# Patient Record
Sex: FEMALE | Race: WHITE | NOT HISPANIC OR LATINO | Employment: OTHER | URBAN - METROPOLITAN AREA
[De-identification: names, ages, dates, MRNs, and addresses within clinical notes are randomized per-mention and may not be internally consistent; named-entity substitution may affect disease eponyms.]

---

## 2017-06-02 ENCOUNTER — OFFICE VISIT (OUTPATIENT)
Dept: FAMILY MEDICINE | Facility: OTHER | Age: 62
End: 2017-06-02
Attending: NURSE PRACTITIONER
Payer: COMMERCIAL

## 2017-06-02 VITALS
DIASTOLIC BLOOD PRESSURE: 78 MMHG | BODY MASS INDEX: 23.82 KG/M2 | HEART RATE: 92 BPM | SYSTOLIC BLOOD PRESSURE: 136 MMHG | WEIGHT: 126 LBS

## 2017-06-02 DIAGNOSIS — N64.52 BLOODY DISCHARGE FROM RIGHT NIPPLE: ICD-10-CM

## 2017-06-02 DIAGNOSIS — Z72.0 TOBACCO ABUSE: ICD-10-CM

## 2017-06-02 DIAGNOSIS — L20.9 ATOPIC DERMATITIS, UNSPECIFIED TYPE: Primary | ICD-10-CM

## 2017-06-02 DIAGNOSIS — Z12.31 ENCOUNTER FOR SCREENING MAMMOGRAM FOR BREAST CANCER: ICD-10-CM

## 2017-06-02 LAB
BASOPHILS # BLD AUTO: 0 10E9/L (ref 0–0.2)
BASOPHILS NFR BLD AUTO: 0.7 %
DIFFERENTIAL METHOD BLD: ABNORMAL
EOSINOPHIL # BLD AUTO: 0.1 10E9/L (ref 0–0.7)
EOSINOPHIL NFR BLD AUTO: 1.9 %
ERYTHROCYTE [DISTWIDTH] IN BLOOD BY AUTOMATED COUNT: 12.4 % (ref 10–15)
HCT VFR BLD AUTO: 42.4 % (ref 35–47)
HGB BLD-MCNC: 14.6 G/DL (ref 11.7–15.7)
LYMPHOCYTES # BLD AUTO: 1.4 10E9/L (ref 0.8–5.3)
LYMPHOCYTES NFR BLD AUTO: 23.1 %
MCH RBC QN AUTO: 34.8 PG (ref 26.5–33)
MCHC RBC AUTO-ENTMCNC: 34.4 G/DL (ref 31.5–36.5)
MCV RBC AUTO: 101 FL (ref 78–100)
MONOCYTES # BLD AUTO: 0.5 10E9/L (ref 0–1.3)
MONOCYTES NFR BLD AUTO: 8.6 %
NEUTROPHILS # BLD AUTO: 3.9 10E9/L (ref 1.6–8.3)
NEUTROPHILS NFR BLD AUTO: 65.7 %
PLATELET # BLD AUTO: 227 10E9/L (ref 150–450)
RBC # BLD AUTO: 4.19 10E12/L (ref 3.8–5.2)
WBC # BLD AUTO: 5.9 10E9/L (ref 4–11)

## 2017-06-02 PROCEDURE — 85025 COMPLETE CBC W/AUTO DIFF WBC: CPT | Performed by: NURSE PRACTITIONER

## 2017-06-02 PROCEDURE — 87205 SMEAR GRAM STAIN: CPT | Performed by: NURSE PRACTITIONER

## 2017-06-02 PROCEDURE — 87070 CULTURE OTHR SPECIMN AEROBIC: CPT | Performed by: NURSE PRACTITIONER

## 2017-06-02 PROCEDURE — 36415 COLL VENOUS BLD VENIPUNCTURE: CPT | Performed by: NURSE PRACTITIONER

## 2017-06-02 PROCEDURE — 99214 OFFICE O/P EST MOD 30 MIN: CPT | Performed by: NURSE PRACTITIONER

## 2017-06-02 RX ORDER — TRIAMCINOLONE ACETONIDE 1 MG/G
OINTMENT TOPICAL
Qty: 80 G | Refills: 1 | Status: SHIPPED | OUTPATIENT
Start: 2017-06-02 | End: 2020-07-16

## 2017-06-02 NOTE — NURSING NOTE
"Chief Complaint   Patient presents with     Recheck Medication     Patient is here following up with her medications     Other     Patient reports leakage from her right.       Initial /78 (BP Location: Right arm, Patient Position: Chair, Cuff Size: Adult Regular)  Pulse 92  Wt 126 lb (57.2 kg)  Breastfeeding? No  BMI 23.82 kg/m2 Estimated body mass index is 23.82 kg/(m^2) as calculated from the following:    Height as of 1/6/15: 5' 0.98\" (1.549 m).    Weight as of this encounter: 126 lb (57.2 kg).  Medication Reconciliation: complete   Kely Traore      "

## 2017-06-02 NOTE — Clinical Note
Bloody - sometimes yellow right nipple discharge 2 weeks, no other symptoms.  Exam normal otherwise.  Overdue for bilateral mammo - last was 2015.  What study would you suggest?

## 2017-06-02 NOTE — MR AVS SNAPSHOT
After Visit Summary   6/2/2017    Rita Fournier    MRN: [id number]           Patient Information     Date Of Birth          1955        Visit Information        Provider Department      6/2/2017 2:45 PM Vannessa Anderson NP [facility]        Today's Diagnoses     Atopic dermatitis, unspecified type    -  1    Bloody discharge from right nipple          Care Instructions      ASSESSMENT/PLAN:                                                      1. Atopic dermatitis, unspecified type  - triamcinolone (KENALOG) 0.1 % ointment; Apply sparingly to affected area three times daily for 14 days.  Dispense: 80 g; Refill: 1    2. Bloody discharge from right nipple  - CBC with platelets differential  - Wound Culture Aerobic Bacterial  - Gram stain  - I will speak to Dr. Tianna Hanley, surgeon, about what imaging to order and will contact you on Monday      Please resume your aspirin 81 MG tablet; Take 1 tablet (81 mg) by mouth daily  Dispense: 100 tablet; Refill: 3      Vannessa Anderson NP  [facility]            Follow-ups after your visit        Who to contact     If you have questions or need follow up information about today's clinic visit or your schedule please contact [facility] directly at [phone].  Normal or non-critical lab and imaging results will be communicated to you by MyChart, letter or phone within 4 business days after the clinic has received the results. If you do not hear from us within 7 days, please contact the clinic through MyChart or phone. If you have a critical or abnormal lab result, we will notify you by phone as soon as possible.  Submit refill requests through [employer] or call your pharmacy and they will forward the refill request to us. Please allow 3 business days for your refill to be completed.          Additional Information About Your Visit        [employer] Information     [employer]ango lets you send messages to your doctor, view your test  "results, renew your prescriptions, schedule appointments and more. To sign up, go to [email or web address] . Click on \"Log in\" on the left side of the screen, which will take you to the Welcome page. Then click on \"Sign up Now\" on the right side of the page.     You will be asked to enter the access code listed below, as well as some personal information. Please follow the directions to create your username and password.     Your access code is: [email or web address]  Expires: 2017  3:19 PM     Your access code will  in 90 days. If you need help or a new code, please call your [place] clinic or [phone].        Care EveryWhere ID     This is your Care EveryWhere ID. This could be used by other organizations to access your [facility] medical records  [id number]        Your Vitals Were     Pulse Breastfeeding? BMI (Body Mass Index)             92 No 23.82 kg/m2          Blood Pressure from Last 3 Encounters:   17 136/78   10/13/15 150/82   01/12/15 121/91    Weight from Last 3 Encounters:   17 126 lb (57.2 kg)   10/13/15 130 lb (59 kg)   01/06/15 125 lb (56.7 kg)              We Performed the Following     CBC with platelets differential     Gram stain     Wound Culture Aerobic Bacterial          Where to get your medicines      These medications were sent to [facility] [id number] [place] [address]  AT [facility] [address]    [address] [facility] [address]     Phone:  [phone]     triamcinolone 0.1 % ointment         Some of these will need a paper prescription and others can be bought over the counter.  Ask your nurse if you have questions.     You don't need a prescription for these medications     aspirin 81 MG tablet          Primary Care Provider Office Phone # Fax #    Vannessa Anderson [phone]       [facility] [address] [address]        Thank you!     Thank you for choosing [facility]  for your " care. Our goal is always to provide you with excellent care. Hearing back from our patients is one way we can continue to improve our services. Please take a few minutes to complete the written survey that you may receive in the mail after your visit with us. Thank you!             Your Updated Medication List - Protect others around you: Learn how to safely use, store and throw away your medicines at www.disposemymeds.org.          This list is accurate as of: 6/2/17  3:19 PM.  Always use your most recent med list.                   Brand Name Dispense Instructions for use    aspirin 81 MG tablet     100 tablet    Take 1 tablet (81 mg) by mouth daily       CLINDAMYCIN HCL PO          KRILL OIL PO      Take 800 mg by mouth daily.       OMEGA-3 FISH OIL PO      Take 1 g by mouth daily       triamcinolone 0.1 % ointment    KENALOG    80 g    Apply sparingly to affected area three times daily for 14 days.       VITAMIN C PO      Take 500 mg by mouth 2 times daily       vitamin D 2000 UNITS tablet     100 tablet    Take 2,000 Units by mouth daily.

## 2017-06-02 NOTE — PROGRESS NOTES
"  SUBJECTIVE:                                                    Rita Fournier is a 61 year old female who presents to clinic today for the following health issues:    Concern - Right breast - nipple discharge     Onset: 2 weeks    Description:   Yellow / red discharge intermittently, no breast pain or lump    Intensity: mild    Progression of Symptoms:  constant    Accompanying Signs & Symptoms:  None.  Denies lump, fevers, pain       Previous history of similar problem:   No breast history in self or mom or siblings    Precipitating factors:   Worsened by: nothing    Alleviating factors:  Improved by: nothing       First period age 12-13  Breast fed 6 children    Menopause at age 38  Partial hysterectomy age 27 due to uterine prolapse  FH negative for breast or ovarian cancer.    Mammogram on file from 10/2015, dense tissue, read as benign findings.  Has not followed up for further mammograms, though orders have been placed.    Weight stable, no fevers or other symptoms      Problem list and histories reviewed & adjusted, as indicated.  Additional history: as documented    Patient Active Problem List   Diagnosis     Tobacco abuse     Dermatitis     Alopecia     Vitamin D deficiency     Past Surgical History:   Procedure Laterality Date     CHOLECYSTECTOMY  1975    Cholelithiasis     COLONOSCOPY N/A 1/12/2015    Procedure: COLONOSCOPY;  Surgeon: Tianna Hanley MD;  Location: [facility]     facial redisection gland removal      LEFT > infected neck gland     hysterectomy./partial  1981       Social History   Substance Use Topics     Smoking status: Current Every Day Smoker     Packs/day: 1.00     Years: 25.00     Types: Cigarettes     Smokeless tobacco: Never Used      Comment: Tried to Quit (YES); Longest Tobacco Free (\"Cutdown\")     Alcohol use Yes      Comment: OCCASIONALLY     Family History   Problem Relation Age of Onset     CANCER Brother      Liver     Colon Cancer Brother      CANCER Brother      " Pancreatic     CANCER Other      Family Hx     DIABETES Father      CANCER Father      Stomach     HEART DISEASE Father      Heart Disease     HEART DISEASE Mother      Heart Disease     CANCER Mother 73     Liver     CANCER Sister 57     Pancreatic - Cause of Death         Current Outpatient Prescriptions   Medication Sig Dispense Refill     CLINDAMYCIN HCL PO        triamcinolone (KENALOG) 0.1 % ointment Apply sparingly to affected area three times daily for 14 days. 80 g 1     Ascorbic Acid (VITAMIN C PO) Take 500 mg by mouth 2 times daily       Omega-3 Fatty Acids (OMEGA-3 FISH OIL PO) Take 1 g by mouth daily       KRILL OIL PO Take 800 mg by mouth daily.       Cholecalciferol (VITAMIN D) 2000 UNITS tablet Take 2,000 Units by mouth daily. (Patient not taking: Reported on 6/2/2017) 100 tablet 3     aspirin 81 MG tablet Take 1 tablet by mouth daily. (Patient not taking: Reported on 6/2/2017) 100 tablet 3     Allergies   Allergen Reactions     Penicillins Hives     BP Readings from Last 3 Encounters:   06/02/17 136/78   10/13/15 150/82   01/12/15 121/91    Wt Readings from Last 3 Encounters:   06/02/17 126 lb (57.2 kg)   10/13/15 130 lb (59 kg)   01/06/15 125 lb (56.7 kg)                  Labs reviewed in EPIC    Reviewed and updated as needed this visit by clinical staff  Tobacco  Allergies  Meds       Reviewed and updated as needed this visit by Provider         ROS:  Constitutional, HEENT, cardiovascular, pulmonary, gi and gu systems are negative, except as otherwise noted.    OBJECTIVE:                                                    /78 (BP Location: Right arm, Patient Position: Chair, Cuff Size: Adult Regular)  Pulse 92  Wt 126 lb (57.2 kg)  Breastfeeding? No  BMI 23.82 kg/m2  Body mass index is 23.82 kg/(m^2).     GENERAL: healthy, alert and no distress  NECK: no adenopathy, no asymmetry, masses, or scars and thyroid normal to palpation  RESP: lungs clear to auscultation - no rales, rhonchi  or wheezes  BREAST: pendulous breast tissue - no notable lumps, tissue change, masses, or tenderness.  No axillary or supraclavicular adenopathy.  Right nipple discharge - serosanguinous, culture obtained.  CV: regular rate and rhythm, normal S1 S2, no S3 or S4, no murmur, click or rub, no peripheral edema and peripheral pulses strong  MS: no gross musculoskeletal defects noted, no edema  SKIN: no suspicious lesions or rashes  NEURO: Normal strength and tone, mentation intact and speech normal           Results for orders placed or performed in visit on 06/02/17   CBC with platelets differential   Result Value Ref Range    WBC 5.9 4.0 - 11.0 10e9/L    RBC Count 4.19 3.8 - 5.2 10e12/L    Hemoglobin 14.6 11.7 - 15.7 g/dL    Hematocrit 42.4 35.0 - 47.0 %     (H) 78 - 100 fl    MCH 34.8 (H) 26.5 - 33.0 pg    MCHC 34.4 31.5 - 36.5 g/dL    RDW 12.4 10.0 - 15.0 %    Platelet Count 227 150 - 450 10e9/L    Diff Method Automated Method     % Neutrophils 65.7 %    % Lymphocytes 23.1 %    % Monocytes 8.6 %    % Eosinophils 1.9 %    % Basophils 0.7 %    Absolute Neutrophil 3.9 1.6 - 8.3 10e9/L    Absolute Lymphocytes 1.4 0.8 - 5.3 10e9/L    Absolute Monocytes 0.5 0.0 - 1.3 10e9/L    Absolute Eosinophils 0.1 0.0 - 0.7 10e9/L    Absolute Basophils 0.0 0.0 - 0.2 10e9/L   Wound Culture Aerobic Bacterial   Result Value Ref Range    Specimen Description Abscess     Special Requests R NIPPLE     Culture Micro Culture in progress     Micro Report Status Pending    Gram stain   Result Value Ref Range    Specimen Description Abscess     Special Requests R NIPPLE     Gram Stain       Rare Epithelial cells seen  No PMNs seen  No organisms seen      Micro Report Status FINAL 06/03/2017        Visit time:   25 Minutes  Time spent with patient, including examination, face to face patient education - 15 mn  Visit Content: During our face to face time, patient education was provided regarding diagnosis, and treatment pan. Patient  counseled regarding disease process  All diagnosis and treatment plan are reviewed with the patient, 50 % of face to face time is directed at patient education  Record review completed      ASSESSMENT/PLAN:                                                      1. Atopic dermatitis, unspecified type  - triamcinolone (KENALOG) 0.1 % ointment; Apply sparingly to affected area three times daily for 14 days.  Dispense: 80 g; Refill: 1    2. Bloody discharge from right nipple  - CBC with platelets differential  - Wound Culture Aerobic Bacterial  - Gram stain  - I will speak to Dr. Tianna Hanley, surgeon, about what imaging to order and will contact you on Monday      Please resume your aspirin 81 MG tablet; Take 1 tablet (81 mg) by mouth daily  Dispense: 100 tablet; Refill: 3      Vannessa Anderson NP  [facility]

## 2017-06-02 NOTE — PATIENT INSTRUCTIONS
ASSESSMENT/PLAN:                                                      1. Atopic dermatitis, unspecified type  - triamcinolone (KENALOG) 0.1 % ointment; Apply sparingly to affected area three times daily for 14 days.  Dispense: 80 g; Refill: 1    2. Bloody discharge from right nipple  - CBC with platelets differential  - Wound Culture Aerobic Bacterial  - Gram stain  - I will speak to Dr. Tianna Hanley, surgeon, about what imaging to order and will contact you on Monday      Please resume your aspirin 81 MG tablet; Take 1 tablet (81 mg) by mouth daily  Dispense: 100 tablet; Refill: 3      Vannessa Anderson NP  [facility]    HOW TO QUIT SMOKING  Smoking is one of the hardest habits to break. About half of all those who have ever smoked have been able to quit, and most of those (about 70%) who still smoke want to quit. Here are some of the best ways to stop smoking.     KEEP TRYING:  It takes most smokers about 8 tries before they are finally able to fully quit. So, the more often you try and fail, the better your chance of quitting the next time! So, don't give up!    GO COLD TURKEY:  Most ex-smokers quit cold turkey. Trying to cut back gradually doesn't seem to work as well, perhaps because it continues the smoking habit. Also, it is possible to fool yourself by inhaling more while smoking fewer cigarettes. This results in the same amount of nicotine in your body!    GET SUPPORT:  Support programs can make an important difference, especially for the heavy smoker. These groups offer lectures, methods to change your behavior and peer support. Call the free national Quitline for more information. 800-QUIT-NOW (055-113-1489). Low-cost or free programs are offered by many hospitals, local chapters of the American Lung Association (682-195-6275) and the American Cancer Society (207-613-6428). Support at home is important too. Non-smokers can help by offering praise and encouragement. If the smoker fails to quit,  encourage them to try again!    OVER-THE-COUNTER MEDICINES:  For those who can't quit on their own, Nicotine Replacement Therapy (NRT) may make quitting much easier. Certain aids such as the nicotine patch, gum and lozenge are available without a prescription. However, it is best to use these under the guidance of your doctor. The skin patch provides a steady supply of nicotine to the body. Nicotine gum and lozenge gives temporary bursts of low levels of nicotine. Both methods take the edge off the craving for cigarettes. WARNING: If you feel symptoms of nicotine overdose, such as nausea, vomiting, dizziness, weakness, or fast heartbeat, stop using these and see your doctor.    PRESCRIPTION MEDICINES:  After evaluating your smoking patterns and prior attempts at quitting, your doctor may offer a prescription medicine such as bupropion (Zyban, Wellbutrin), varenicline (Chantix, Champix), a niocotine inhaler or nasal spray. Each has its unique advantage and side effects which your doctor can review with you.    HEALTH BENEFITS OF QUITTING:  The benefits of quitting start right away and keep improving the longer you go without smokin minutes: blood pressure and pulse return to normal  8 hours: oxygen levels return to normal  2 days: ability to smell and taste begins to improve as damaged nerves start to regrow  2-3 weeks: circulation and lung function improves  1-9 months: decreased cough, congestion and shortness of breath; less tired  1 year: risk of heart attack decreases by half  5 years: risk of lung cancer decreases by half; risk of stroke becomes the same as a non-smoker  For information about how to quit smoking, visit the following links:  National [employer] ,   Clearing the Air, Quit Smoking Today   - an online booklet. http://www.smokefree.gov/pubs/clearing_the_air.pdf  Smokefree.gov http://smokefree.gov/  QuitNet http://www.quitnet.com/    7755-2407 Radha Tavera, [address] [facility],  [place] [address]. All rights reserved. This information is not intended as a substitute for professional medical care. Always follow your healthcare professional's instructions.    The Benefits of Living Smoke Free  What do you want to gain from quitting? Check off some reasons to quit.  Health Benefits  ___ Reduce my risk of lung cancer, heart disease, chronic lung disease  ___ Have fewer wrinkles and softer skin  ___ Improve my sense of taste and smell  ___ For pregnant women--reduce the risk of having a miscarriage, stillbirth, premature birth, or low-birth-weight baby  Personal Benefits  ___ Feel more in control of my life  ___ Have better-smelling hair, breath, clothes, home, and car  ___ Save time by not having to take smoke breaks, buy cigarettes, or hunt for a light  ___ Have whiter teeth  Family Benefits  ___ Reduce my children s respiratory tract infections  ___ Set a good example for my children  ___ Reduce my family s cancer risk  Financial Benefits  ___ Save hundreds of dollars each year that would be spent on cigarettes  ___ Save money on medical bills  ___ Save on life, health, and car insurance premiums    Those Dollars Add Up!  Cigarettes are expensive, and getting more expensive all the time. Do you realize how much money you are spending on cigarettes per year? What is the average amount you spend on a pack of cigarettes? What is the average number of packs that you smoke per day? Using your answers to these questions, fill in this formula to help you find out:  ($ _____ per pack) ×  ( _____ number of packs per day) × (365 days) =  $ _____ yearly cost of smoking  Besides tobacco, there are other costs, including extra cleaning bills and replacement costs for clothing and furniture; medical expenses for smoking-related illnesses; and higher health, life, and car insurance premiums.    Cigars and Pipes Count Too!  Cigars and pipes are also dangerous. So are smokeless (chewing) tobacco and snuff.  All of these products contain nicotine, a highly addictive substance that has harmful effects on your body. Quitting smoking means giving up all tobacco products.      8857-3695 Radha [facility], [address] [place] [address], [place] [address]. All rights reserved. This information is not intended as a substitute for professional medical care. Always follow your healthcare professional's instructions.

## 2017-06-02 NOTE — Clinical Note
Please let her know that no bacteria grew from her breast drainage culture, and her CBC was normal.  I will be speaking to General Surgeon Dr Hanley, to see what imaging she recommends.  Regarding her smoking, I sent Quit-plan through, so she will be getting information from them and can at least learn more, and consider options.  Will call her as soon as I hear from Dr. Hanley  Thank you;  Vannessa Anderson [facility] [phone]

## 2017-06-02 NOTE — Clinical Note
Please let patient know that I spoke with Dr. Hanley and she recommends a bilateral mammogram (tomogram) and right breast US.  Orders were placed.  Pls have Radha confirm that this gets done this week.  Thank you  Vannessa MAYERSROBBY [phone]

## 2017-06-03 LAB
GRAM STN SPEC: NORMAL
Lab: NORMAL
MICRO REPORT STATUS: NORMAL
SPECIMEN SOURCE: NORMAL

## 2017-06-05 LAB
BACTERIA SPEC CULT: NORMAL
Lab: NORMAL
MICRO REPORT STATUS: NORMAL
SPECIMEN SOURCE: NORMAL

## 2017-06-06 NOTE — PROGRESS NOTES
Pt notified of Vannessa's comments, scheduled already for 6-14th, will let Vannessa know if this is soon enough.

## 2017-06-07 ENCOUNTER — TELEPHONE (OUTPATIENT)
Dept: FAMILY MEDICINE | Facility: OTHER | Age: 62
End: 2017-06-07

## 2017-06-08 DIAGNOSIS — N64.52 BLOODY DISCHARGE FROM RIGHT NIPPLE: Primary | ICD-10-CM

## 2017-06-08 DIAGNOSIS — R92.8 ABNORMAL ULTRASOUND OF BREAST: ICD-10-CM

## 2017-06-09 ENCOUNTER — HOSPITAL ENCOUNTER (OUTPATIENT)
Dept: ULTRASOUND IMAGING | Facility: HOSPITAL | Age: 62
Discharge: HOME | End: 2017-06-09
Attending: NURSE PRACTITIONER | Admitting: NURSE PRACTITIONER
Payer: COMMERCIAL

## 2017-06-09 PROCEDURE — 76642 ULTRASOUND BREAST LIMITED: CPT | Mod: TC,RT | Performed by: RADIOLOGY

## 2017-06-09 PROCEDURE — G0204 DX MAMMO INCL CAD BI: HCPCS | Mod: TC | Performed by: RADIOLOGY

## 2017-06-09 PROCEDURE — G0279 TOMOSYNTHESIS, MAMMO: HCPCS | Mod: TC | Performed by: RADIOLOGY

## 2017-06-09 NOTE — PROGRESS NOTES
I believe Dr. Hanley is going to see her  Did the breast center notify patient?  Ill enter Gen Surg Consult    Thank you

## 2017-06-12 DIAGNOSIS — N63.10 BREAST MASS, RIGHT: Primary | ICD-10-CM

## 2017-06-13 ENCOUNTER — TELEPHONE (OUTPATIENT)
Dept: MAMMOGRAPHY | Facility: OTHER | Age: 62
End: 2017-06-13

## 2017-06-13 NOTE — TELEPHONE ENCOUNTER
Dr. Hanley states ok to schedule ultrasound breast biopsy.  Patient education performed on what to expect with breast biopsy, questions answered.  Appointment given of 6/15 to arrive at 1000 at hospital registration.  Asiya MATTHEW.

## 2017-06-15 ENCOUNTER — HOSPITAL ENCOUNTER (OUTPATIENT)
Dept: INTERVENTIONAL RADIOLOGY/VASCULAR | Facility: HOSPITAL | Age: 62
End: 2017-06-15
Attending: SURGERY
Payer: COMMERCIAL

## 2017-06-15 ENCOUNTER — HOSPITAL ENCOUNTER (OUTPATIENT)
Dept: ULTRASOUND IMAGING | Facility: HOSPITAL | Age: 62
Discharge: HOME | End: 2017-06-15
Attending: SURGERY | Admitting: SURGERY
Payer: COMMERCIAL

## 2017-06-15 PROCEDURE — 40000272 ZZH STATISTIC POST PROCEDURE PLACEMENT MAMMOGRAMS: Mod: TC,RT

## 2017-06-15 PROCEDURE — 19083 BX BREAST 1ST LESION US IMAG: CPT | Mod: TC,RT

## 2017-06-15 PROCEDURE — 88305 TISSUE EXAM BY PATHOLOGIST: CPT | Mod: TC | Performed by: SURGERY

## 2017-06-15 PROCEDURE — 25000125 ZZHC RX 250

## 2017-06-15 RX ORDER — LIDOCAINE HYDROCHLORIDE 10 MG/ML
INJECTION, SOLUTION EPIDURAL; INFILTRATION; INTRACAUDAL; PERINEURAL
Status: COMPLETED
Start: 2017-06-15 | End: 2017-06-15

## 2017-06-15 RX ORDER — LIDOCAINE HYDROCHLORIDE 10 MG/ML
20 INJECTION, SOLUTION EPIDURAL; INFILTRATION; INTRACAUDAL; PERINEURAL ONCE
Status: COMPLETED | OUTPATIENT
Start: 2017-06-15 | End: 2017-06-15

## 2017-06-15 RX ORDER — LIDOCAINE HYDROCHLORIDE AND EPINEPHRINE 15; 5 MG/ML; UG/ML
10 INJECTION, SOLUTION EPIDURAL ONCE
Status: DISCONTINUED | OUTPATIENT
Start: 2017-06-15 | End: 2017-06-16 | Stop reason: HOSPADM

## 2017-06-15 RX ADMIN — LIDOCAINE HYDROCHLORIDE 70 MG: 10 INJECTION, SOLUTION EPIDURAL; INFILTRATION; INTRACAUDAL; PERINEURAL at 10:31

## 2017-06-15 NOTE — PROGRESS NOTES
Patient here for ultrasound right breast biopsy.  Procedure explained by myself and by radiologist and all questions answered.  Consent signed.  Pt to biopsy room.  Time out done and biopsy performed.  Clip placed after biopsy.  Clip lot number [id number] .  Post clip mammogram performed.  Pressure held to site until bleeding completely stopped.  Steristrips and pressure dressing applied to site.  Ice pack with instructions on use given to patient.  Verbal and written discharge instructions given to patient.  Patient states understanding of all discharge instructions.  Pt was discharged to home in stable condition and without evidence of bleeding.  Asiya MATTHEW

## 2017-06-15 NOTE — IP AVS SNAPSHOT
MRN:[id number]                      After Visit Summary   6/15/2017    Rita Fournier    MRN: [id number]           Visit Information        Provider Department      6/15/2017 10:30 AM Radiologist, [facility]           Review of your medicines      UNREVIEWED medicines. Ask your doctor about these medicines        Dose / Directions    aspirin 81 MG tablet        Dose:  81 mg   Take 1 tablet (81 mg) by mouth daily   Quantity:  100 tablet   Refills:  3       CLINDAMYCIN HCL PO        Refills:  0       KRILL OIL PO        Dose:  800 mg   Take 800 mg by mouth daily.   Refills:  0       OMEGA-3 FISH OIL PO        Dose:  1 g   Take 1 g by mouth daily   Refills:  0       triamcinolone 0.1 % ointment   Commonly known as:  KENALOG   Used for:  Atopic dermatitis, unspecified type        Apply sparingly to affected area three times daily for 14 days.   Quantity:  80 g   Refills:  1       VITAMIN C PO        Dose:  500 mg   Take 500 mg by mouth 2 times daily   Refills:  0       vitamin D 2000 UNITS tablet   Used for:  Vitamin D deficiency        Dose:  2000 Units   Take 2,000 Units by mouth daily.   Quantity:  100 tablet   Refills:  3       ZITHROMAX Z-BILLY PO        Dose:  250 mg   Take 250 mg by mouth daily   Refills:  0                Protect others around you: Learn how to safely use, store and throw away your medicines at www.disposemymeds.org.         Follow-ups after your visit        Your next 10 appointments already scheduled     Josiah 15, 2017 10:30 AM CDT   Radiology with [facility] UNTRASOUND 1   [facility] ([facility] )    [address] [place] [address]   [phone]            Josiah 15, 2017 10:30 AM CDT   Radiology with Need Interventional Radiologist   [facility] ([facility] )    [address] [place] [address]   [phone]               Care Instructions        Further instructions from your care team      "    DISCHARGE INSTRUCTIONS FOR  BREAST BIOPSY    BREAST BIOPSY  A needle was used to locate the breast tissue. Tissue was removed to check the cells and be examined by a Pathologist. This will help your doctor plan any further treatment or follow-up. Your doctor will tell you the results of the tests in 3 to 5 days.    Follow these instructions:    Climb stairs slowly and use the hand rail. Avoid extra trips. No running or jumping.    No pushing, pulling or straining (vacuuming, shoveling, sweeping etc.)    You may shower tomorrow, pat the are dry around the tegaderm dressing.    Wear a bra at all times until your breast is fully healed.    Apply ice to the breast during the first few hours following the procedure to relieve swelling and bruising.    Steri strips stay in place for 7-10 days or until they fall off. Do not pull them off.    May remove pressure dressing after 24 hours.    Call your doctor if you have:    increased bleeding or drainage from your incision.    swelling, redness or opening of your incision.    foul smell from your incision or dressing.    red streaks from your incision.    chills or fever over 101 degrees (by mouth).    pain not helped by your pain medication.    trouble or pain with breathing.    any new problems or concerns.     Additional Information About Your Visit        B[employer] Information     [employer] lets you send messages to your doctor, view your test results, renew your prescriptions, schedule appointments and more. To sign up, go to [email or web address] . Click on \"Log in\" on the left side of the screen, which will take you to the Welcome page. Then click on \"Sign up Now\" on the right side of the page.     You will be asked to enter the access code listed below, as well as some personal information. Please follow the directions to create your username and password.     Your access code is: [email or web address]  Expires: 2017  3:19 PM     Your access code will  in 90 days. " If you need help or a new code, please call your [place] clinic or [phone].        Care EveryWhere ID     This is your Care EveryWhere ID. This could be used by other organizations to access your [place] medical records  [id number]         Primary Care Provider Office Phone # Fax Natasha Anderson [phone]      Thank you!     Thank you for choosing [place] for your care. Our goal is always to provide you with excellent care. Hearing back from our patients is one way we can continue to improve our services. Please take a few minutes to complete the written survey that you may receive in the mail after you visit with us. Thank you!             Medication List: This is a list of all your medications and when to take them. Check marks below indicate your daily home schedule. Keep this list as a reference.      Medications           Morning Afternoon Evening Bedtime As Needed    aspirin 81 MG tablet   Take 1 tablet (81 mg) by mouth daily                                CLINDAMYCIN HCL PO                                KRILL OIL PO   Take 800 mg by mouth daily.                                OMEGA-3 FISH OIL PO   Take 1 g by mouth daily                                triamcinolone 0.1 % ointment   Commonly known as:  KENALOG   Apply sparingly to affected area three times daily for 14 days.                                VITAMIN C PO   Take 500 mg by mouth 2 times daily                                vitamin D 2000 UNITS tablet   Take 2,000 Units by mouth daily.                                ZITHROMAX Z-BILLY PO   Take 250 mg by mouth daily

## 2017-06-15 NOTE — IP AVS SNAPSHOT
[facility]    [address] [place] [address]    Phone:  [phone]    Fax:  [phone]                                       After Visit Summary   6/15/2017    Rita Fournier    MRN: [id number]           After Visit Summary Signature Page     I have received my discharge instructions, and my questions have been answered. I have discussed any challenges I see with this plan with the nurse or doctor.    ..........................................................................................................................................  Patient/Patient Representative Signature      ..........................................................................................................................................  Patient Representative Print Name and Relationship to Patient    ..................................................               ................................................  Date                                            Time    ..........................................................................................................................................  Reviewed by Signature/Title    ...................................................              ..............................................  Date                                                            Time

## 2017-06-16 LAB — COPATH REPORT: NORMAL

## 2017-06-19 ENCOUNTER — TELEPHONE (OUTPATIENT)
Dept: MAMMOGRAPHY | Facility: OTHER | Age: 62
End: 2017-06-19

## 2017-06-19 NOTE — TELEPHONE ENCOUNTER
Patient has already been notified of pathology results by Vannessa Anderson's office.  Informed patient that a six month follow up mammogram has been ordered, DI schedulers notified to call in December to schedule.  Patient requests appointment with Dr. Hanley.  Dr. Hanley notified and surgery [facility] notified to ronni Braden RN.

## 2017-06-21 DIAGNOSIS — N64.52 DISCHARGE FROM RIGHT NIPPLE: Primary | ICD-10-CM

## 2017-06-21 NOTE — PROGRESS NOTES
General surgery - Nipple discharge, imaging and path are in Epic    Vannessa Anderson [facility]  [phone]

## 2017-06-21 NOTE — Clinical Note
I entered another order for surgical consult with you. Imaging and biopsy are in Epic.  Nipple discharge persists and is really bothering her. Id appreciate it if you could see her.  Vannessa [facility] [phone]

## 2017-06-26 ENCOUNTER — OFFICE VISIT (OUTPATIENT)
Dept: SURGERY | Facility: OTHER | Age: 62
End: 2017-06-26
Attending: NURSE PRACTITIONER
Payer: COMMERCIAL

## 2017-06-26 VITALS
TEMPERATURE: 97.9 F | RESPIRATION RATE: 20 BRPM | HEART RATE: 75 BPM | WEIGHT: 122 LBS | SYSTOLIC BLOOD PRESSURE: 154 MMHG | HEIGHT: 61 IN | DIASTOLIC BLOOD PRESSURE: 68 MMHG | BODY MASS INDEX: 23.03 KG/M2 | OXYGEN SATURATION: 94 %

## 2017-06-26 DIAGNOSIS — N63.0 BREAST MASS: Primary | ICD-10-CM

## 2017-06-26 PROCEDURE — 99244 OFF/OP CNSLTJ NEW/EST MOD 40: CPT | Performed by: SURGERY

## 2017-06-26 RX ORDER — CEFAZOLIN SODIUM 2 G/100ML
2 INJECTION, SOLUTION INTRAVENOUS
Status: CANCELLED | OUTPATIENT
Start: 2017-06-26

## 2017-06-26 ASSESSMENT — PAIN SCALES - GENERAL: PAINLEVEL: NO PAIN (0)

## 2017-06-26 NOTE — PROGRESS NOTES
"[facility] Surgery Consultation    CC:  Right bloody nipple discharge    HPI:  This 61 year old year old female is seen at the request of Vannessa Anderson NP for evaluation of bloody nipple discharge.   The patient noted the onset of bloody nipple discharge approximately one month ago.  This was spontaneous, culture negative and evaluated with imaging.  Ultrasound was performed and an abnormality was identified, biopsied and benign fibrocystic change noted.     RISK FACTORS FOR BREAST NEOPLASIA:  Her menarche was at age 10 and menopause at age 40.  She was on estrogen replacement for 5 years.  She has 6 children, the eldest age 41 and breast fed all.  There is no known history of breast, ovarian, colon or pancreatic malignancy in her family.    Past Medical History:   Diagnosis Date     Alopecia      Dermatitis      Hyperlipidemia LDL goal < 100      Tobacco abuse 06/27/2011     Vitamin D deficiency 5/7/2013     Past Surgical History:   Procedure Laterality Date     BIOPSY Right 06/15/2017    US guided right breast needle biopsy     CHOLECYSTECTOMY  1975    Cholelithiasis     COLONOSCOPY N/A 1/12/2015    Procedure: COLONOSCOPY;  Surgeon: Tianna Hanley MD;  Location: [facility] OR     facial redisection gland removal      LEFT > infected neck gland     hysterectomy./partial  1981     Current Outpatient Prescriptions   Medication     triamcinolone (KENALOG) 0.1 % ointment     aspirin 81 MG tablet     Ascorbic Acid (VITAMIN C PO)     Omega-3 Fatty Acids (OMEGA-3 FISH OIL PO)     KRILL OIL PO     Cholecalciferol (VITAMIN D) 2000 UNITS tablet     No current facility-administered medications for this visit.      Allergies   Allergen Reactions     Penicillins Hives     HABITS:    Social History   Substance Use Topics     Smoking status: Current Every Day Smoker     Packs/day: 1.00     Years: 25.00     Types: Cigarettes     Smokeless tobacco: Never Used      Comment: Tried to Quit (YES); Longest Tobacco Free (\"Cutdown\")     " "Alcohol use Yes      Comment: OCCASIONALLY       Family History   Problem Relation Age of Onset     CANCER Brother      Liver     Colon Cancer Brother      CANCER Brother      Pancreatic     CANCER Other      Family Hx     DIABETES Father      CANCER Father      Stomach     HEART DISEASE Father      Heart Disease     HEART DISEASE Mother      Heart Disease     CANCER Mother 73     Liver     CANCER Sister 57     Pancreatic - Cause of Death       REVIEW OF SYSTEMS:  Ten point review of systems negative except those mentioned in the HPI.     OBJECTIVE:    /68 (BP Location: Right arm, Patient Position: Chair, Cuff Size: Adult Regular)  Pulse 75  Temp 97.9  F (36.6  C) (Tympanic)  Resp 20  Ht 5' 1\" (1.549 m)  Wt 122 lb (55.3 kg)  SpO2 94%  BMI 23.05 kg/m2    GENERAL: Generally appears well, in no distress with appropriate affect.  HEENT:   Sclerae anicteric - No cervical, supra/infraclavciular lymphadenopathy, no thyroid masses  Respiratory:  Lungs clear to ausculation bilaterally with good air excursion  Cardiovascular:  Regular Rate and Rhythm with no murmurs gallops or rubs, normal   Breast Examination:  In the seated position with the arms elevated, there is ecchymosis at the 7:00 position from biopsy.  There is no contour distortion, skin change or nipple abnormality.    In the supine position, the right breast shows a tender, firm region at 7:00 position at the areolar border extending laterally one cm.  The remainder of the right breast shows a homogeneous parenchyma.  Focused examination in all quadrants shows no suggestion of mass, density, region of tenderness or palpable abnormality.  The subareolar region, axillary tail and right axilla are similarly without finding.    The left breast shows a similar homogeneous parenchyma devoid of abnormality.  There is no palpable correlate in the region of mammographic suspicion.  Focused examination in all quadrants shows no region of discrete or suspicious " mass, asymmetric density or tenderness.  The subareolar region, axillary tail and left axilla are without finding.  A skin tag is present at the medial nipple region and excision requested.    :  deferred  Extremities:  Extremities normal. No deformities, edema, or skin discoloration.  Skin:  no suspicious lesions or rashes  Neurological: grossly intact    Psych:  Alert, oriented, affect appropriate with normal decision making ability.    IMPRESSION:  Bloody nipple discharge, dilated ducts with guided biopsy showing benign fibrocystic change which is not concordant.  Excision is indicated.  Pathologic entities described including ductal ectasia, papilloma, malignancy and questions were asked and answered.   Procedure, risks, benefits and approach discussed at length and she voices understanding.    PLAN:  Scheduled Monday, July 17, 2017.  Pre-op with KASEY Quick MD, FACS    6/26/2017  3:19 PM    cc:  Vannessa Anderson NP

## 2017-06-26 NOTE — NURSING NOTE
"Chief Complaint   Patient presents with     Consult     s/p 6/15/17 right US guided needle breast biopsy.       Initial /68 (BP Location: Right arm, Patient Position: Chair, Cuff Size: Adult Regular)  Pulse 75  Temp 97.9  F (36.6  C) (Tympanic)  Resp 20  Ht 5' 1\" (1.549 m)  Wt 122 lb (55.3 kg)  SpO2 94%  BMI 23.05 kg/m2 Estimated body mass index is 23.05 kg/(m^2) as calculated from the following:    Height as of this encounter: 5' 1\" (1.549 m).    Weight as of this encounter: 122 lb (55.3 kg).  Medication Reconciliation: cristobal Alvarado LPN    "

## 2017-06-26 NOTE — PATIENT INSTRUCTIONS
Thank you for allowing Dr. Hanley and our surgical team to participate in your care.  If you have a scheduling or an appointment question please contact our Health Unit Coordinator, Malina,  at her direct line [phone].   ALL nursing questions or concerns can be directed to your surgical nurse at: [phone] -Emy    You are scheduled for Excisional biopsy right breast mass - excision skin tag left breast.    Your procedure date is:   Monday, July 17, 2017    Nothing to eat or drink after midnight the evening prior to the procedure, Sunday, July 16, 2017 July 12, 2017 is your preop with Vannessa Anderson NP      HOW TO PREPARE-      You need to have a scheduled Pre-Op with your primary care physician within 10 days of your scheduled surgery. Please call as soon as possible to schedule this.       You need a friend or family member available to drive you home AND stay with you for 24 hours after you leave the hospital. You will not be allowed to drive yourself. IF you need to take a taxi or the bus you MUST have a responsible person to ride with you. YOUR PROCEDURE WILL BE CANCELLED IF YOU DO NOT HAVE A RESPONSIBLE ADULT TO DRIVE YOU HOME.       You CANNOT have anything to eat or drink after midnight the night before your surgery, ncluding water and coffee. Your stomach needs to be completely empty. Do NOT chew gum, suck on hard candy, or smoke. You can brush your teeth the morning of surgery.       You need to call our Surgery Education Nurses 1-2 weeks prior to your surgery date at  [phone] or toll free [phone]. Please have you medication and allergy lists ready.      Stop your aspirin or other NSAIDs(Ibuprofen, Motrin, Aleve, Celebrex, Naproxen, etc...) 7 days before your surgery.      Hospital admitting will call you the day before your surgery with your arrival time. If you are scheduled on a Monday admitting will call you the Friday before.      Please call your primary care physician if you  should become ill within 24 hours of scheduled surgery. (ex.vomiting, diarrhea, fever)          You will need to wash the night before AND the morning of you procedure with the supplied Hibiclens. Wash your Surgical area with your bare hands, apply friction and rinse. KEEP IT AWAY FROM YOUR EYES, EARS, NOSE AND MOUTH.

## 2017-06-26 NOTE — MR AVS SNAPSHOT
After Visit Summary   6/26/2017    Rita Fournier    MRN: [id number]           Patient Information     Date Of Birth          1955        Visit Information        Provider Department      6/26/2017 3:00 PM Tianna Hanley MD [facility]        Today's Diagnoses     Symptomatic cholelithiasis    -  1      Care Instructions    Thank you for allowing Dr. Hanley and our surgical team to participate in your care.  If you have a scheduling or an appointment question please contact our Health Unit Coordinator, Malina,  at her direct line [phone].   ALL nursing questions or concerns can be directed to your surgical nurse at: [phone] -Emy    You are scheduled for Excisional biopsy right breast mass - excision skin tag left breast.    Your procedure date is:   Monday, July 17, 2017    Nothing to eat or drink after midnight the evening prior to the procedure, Sunday, July 16, 2017 July 12, 2017 is your preop with Vannessa Anderson NP      HOW TO PREPARE-      You need to have a scheduled Pre-Op with your primary care physician within 10 days of your scheduled surgery. Please call as soon as possible to schedule this.       You need a friend or family member available to drive you home AND stay with you for 24 hours after you leave the hospital. You will not be allowed to drive yourself. IF you need to take a taxi or the bus you MUST have a responsible person to ride with you. YOUR PROCEDURE WILL BE CANCELLED IF YOU DO NOT HAVE A RESPONSIBLE ADULT TO DRIVE YOU HOME.       You CANNOT have anything to eat or drink after midnight the night before your surgery, ncluding water and coffee. Your stomach needs to be completely empty. Do NOT chew gum, suck on hard candy, or smoke. You can brush your teeth the morning of surgery.       You need to call our Surgery Education Nurses 1-2 weeks prior to your surgery date at  [phone] or toll free [phone]. Please have you medication  and allergy lists ready.      Stop your aspirin or other NSAIDs(Ibuprofen, Motrin, Aleve, Celebrex, Naproxen, etc...) 7 days before your surgery.      Hospital admitting will call you the day before your surgery with your arrival time. If you are scheduled on a Monday admitting will call you the Friday before.      Please call your primary care physician if you should become ill within 24 hours of scheduled surgery. (ex.vomiting, diarrhea, fever)          You will need to wash the night before AND the morning of you procedure with the supplied Hibiclens. Wash your Surgical area with your bare hands, apply friction and rinse. KEEP IT AWAY FROM YOUR EYES, EARS, NOSE AND MOUTH.             Follow-ups after your visit        Your next 10 appointments already scheduled     Jul 12, 2017  1:15 PM CDT   (Arrive by 1:00 PM)   Pre-Op physical with Vannessa Anderson NP   [facility] ([facility] )    [address] [facility] [address]   [phone]              Who to contact     If you have questions or need follow up information about today's clinic visit or your schedule please contact [facility] directly at [phone].  Normal or non-critical lab and imaging results will be communicated to you by MyChart, letter or phone within 4 business days after the clinic has received the results. If you do not hear from us within 7 days, please contact the clinic through MyChart or phone. If you have a critical or abnormal lab result, we will notify you by phone as soon as possible.  Submit refill requests through [employer] App or call your pharmacy and they will forward the refill request to us. Please allow 3 business days for your refill to be completed.          Additional Information About Your Visit        [employer] Information     [employer] App lets you send messages to your doctor, view your test results, renew your prescriptions, schedule appointments and more. To sign up, go to  [email or web address] . Click on \"Log in\" on the left side of the screen, which will take you to the Welcome page. Then click on \"Sign up Now\" on the right side of the page.     You will be asked to enter the access code listed below, as well as some personal information. Please follow the directions to create your username and password.     Your access code is: [email or web address]  Expires: 2017  3:19 PM     Your access code will  in 90 days. If you need help or a new code, please call your [place] clinic or [phone].        Care EveryWhere ID     This is your Care EveryWhere ID. This could be used by other organizations to access your [place] medical records  [id number]        Your Vitals Were     Pulse Temperature Respirations Height Pulse Oximetry BMI (Body Mass Index)    75 97.9  F (36.6  C) (Tympanic) 20 5' 1\" (1.549 m) 94% 23.05 kg/m2       Blood Pressure from Last 3 Encounters:   17 154/68   17 136/78   10/13/15 150/82    Weight from Last 3 Encounters:   17 122 lb (55.3 kg)   17 126 lb (57.2 kg)   10/13/15 130 lb (59 kg)              Today, you had the following     No orders found for display       Primary Care Provider Office Phone # Fax #    Vannessa AndersonKASEY [phone]       [address] [address]        Equal Access to Services     MARIA ALEJANDRA MURDOCK : Hadii aad ku hadasho Soomaali, waaxda luqadaha, qaybta kaalmada adedanyyalakeisha, mirtha andersen . So [facility] [phone].    ATENCIÓN: Si habla español, tiene a soto disposición servicios gratuitos de asistencia lingüística. Llame al [phone].    We comply with applicable federal civil rights laws and [place] laws. We do not discriminate on the basis of race, color, national origin, age, disability sex, sexual orientation or gender identity.            Thank you!     Thank you for choosing [facility]  for your care. Our goal is always to provide " you with excellent care. Hearing back from our patients is one way we can continue to improve our services. Please take a few minutes to complete the written survey that you may receive in the mail after your visit with us. Thank you!             Your Updated Medication List - Protect others around you: Learn how to safely use, store and throw away your medicines at www.disposemymeds.org.          This list is accurate as of: 6/26/17  3:39 PM.  Always use your most recent med list.                   Brand Name Dispense Instructions for use Diagnosis    aspirin 81 MG tablet     100 tablet    Take 1 tablet (81 mg) by mouth daily        KRILL OIL PO      Take 800 mg by mouth daily.        OMEGA-3 FISH OIL PO      Take 1 g by mouth daily        triamcinolone 0.1 % ointment    KENALOG    80 g    Apply sparingly to affected area three times daily for 14 days.    Atopic dermatitis, unspecified type       VITAMIN C PO      Take 500 mg by mouth 2 times daily        vitamin D 2000 UNITS tablet     100 tablet    Take 2,000 Units by mouth daily.    Vitamin D deficiency

## 2017-07-10 NOTE — OR NURSING
Email sent secure to [facility] as follows;    We have Rita Fournier : 1955 coming  for mass removed from right breast and skin tag removed from left breast with Dr. Hanley.    Thank you,  Yanelis Hector R.N.  Pre-Anesthesia Testing  [facility]

## 2017-07-12 ENCOUNTER — OFFICE VISIT (OUTPATIENT)
Dept: FAMILY MEDICINE | Facility: OTHER | Age: 62
End: 2017-07-12
Attending: NURSE PRACTITIONER
Payer: COMMERCIAL

## 2017-07-12 VITALS
WEIGHT: 124 LBS | TEMPERATURE: 97.2 F | BODY MASS INDEX: 23.41 KG/M2 | HEART RATE: 72 BPM | RESPIRATION RATE: 14 BRPM | OXYGEN SATURATION: 94 % | HEIGHT: 61 IN | SYSTOLIC BLOOD PRESSURE: 134 MMHG | DIASTOLIC BLOOD PRESSURE: 74 MMHG

## 2017-07-12 DIAGNOSIS — Z01.818 PRE-OPERATIVE GENERAL PHYSICAL EXAMINATION: Primary | ICD-10-CM

## 2017-07-12 DIAGNOSIS — Z01.818 PREOP GENERAL PHYSICAL EXAM: ICD-10-CM

## 2017-07-12 PROBLEM — N64.52 NIPPLE DISCHARGE IN FEMALE: Status: ACTIVE | Noted: 2017-07-12

## 2017-07-12 PROBLEM — N63.10 LUMP OF BREAST, RIGHT: Status: ACTIVE | Noted: 2017-07-12

## 2017-07-12 LAB
ALBUMIN UR-MCNC: NEGATIVE MG/DL
ANION GAP SERPL CALCULATED.3IONS-SCNC: 7 MMOL/L (ref 3–14)
APPEARANCE UR: CLEAR
BASOPHILS # BLD AUTO: 0.1 10E9/L (ref 0–0.2)
BASOPHILS NFR BLD AUTO: 0.7 %
BILIRUB UR QL STRIP: NEGATIVE
BUN SERPL-MCNC: 11 MG/DL (ref 7–30)
CALCIUM SERPL-MCNC: 9.3 MG/DL (ref 8.5–10.1)
CHLORIDE SERPL-SCNC: 106 MMOL/L (ref 94–109)
CO2 SERPL-SCNC: 25 MMOL/L (ref 20–32)
COLOR UR AUTO: YELLOW
CREAT SERPL-MCNC: 0.61 MG/DL (ref 0.52–1.04)
DIFFERENTIAL METHOD BLD: ABNORMAL
EOSINOPHIL # BLD AUTO: 0.2 10E9/L (ref 0–0.7)
EOSINOPHIL NFR BLD AUTO: 2.3 %
ERYTHROCYTE [DISTWIDTH] IN BLOOD BY AUTOMATED COUNT: 12.5 % (ref 10–15)
GFR SERPL CREATININE-BSD FRML MDRD: NORMAL ML/MIN/1.7M2
GLUCOSE SERPL-MCNC: 93 MG/DL (ref 70–99)
GLUCOSE UR STRIP-MCNC: NEGATIVE MG/DL
HCT VFR BLD AUTO: 44.1 % (ref 35–47)
HGB BLD-MCNC: 15 G/DL (ref 11.7–15.7)
HGB UR QL STRIP: ABNORMAL
KETONES UR STRIP-MCNC: NEGATIVE MG/DL
LEUKOCYTE ESTERASE UR QL STRIP: NEGATIVE
LYMPHOCYTES # BLD AUTO: 1.5 10E9/L (ref 0.8–5.3)
LYMPHOCYTES NFR BLD AUTO: 22 %
MCH RBC QN AUTO: 34.6 PG (ref 26.5–33)
MCHC RBC AUTO-ENTMCNC: 34 G/DL (ref 31.5–36.5)
MCV RBC AUTO: 102 FL (ref 78–100)
MONOCYTES # BLD AUTO: 0.7 10E9/L (ref 0–1.3)
MONOCYTES NFR BLD AUTO: 10.3 %
NEUTROPHILS # BLD AUTO: 4.5 10E9/L (ref 1.6–8.3)
NEUTROPHILS NFR BLD AUTO: 64.7 %
NITRATE UR QL: NEGATIVE
NON-SQ EPI CELLS #/AREA URNS LPF: NORMAL /LPF
PH UR STRIP: 5.5 PH (ref 5–7)
PLATELET # BLD AUTO: 229 10E9/L (ref 150–450)
POTASSIUM SERPL-SCNC: 4.1 MMOL/L (ref 3.4–5.3)
RBC # BLD AUTO: 4.34 10E12/L (ref 3.8–5.2)
RBC #/AREA URNS AUTO: NORMAL /HPF (ref 0–2)
SODIUM SERPL-SCNC: 138 MMOL/L (ref 133–144)
SP GR UR STRIP: 1.02 (ref 1–1.03)
URN SPEC COLLECT METH UR: ABNORMAL
UROBILINOGEN UR STRIP-ACNC: 0.2 EU/DL (ref 0.2–1)
WBC # BLD AUTO: 6.9 10E9/L (ref 4–11)
WBC #/AREA URNS AUTO: NORMAL /HPF (ref 0–2)

## 2017-07-12 PROCEDURE — 80048 BASIC METABOLIC PNL TOTAL CA: CPT | Performed by: NURSE PRACTITIONER

## 2017-07-12 PROCEDURE — 99214 OFFICE O/P EST MOD 30 MIN: CPT | Mod: 25 | Performed by: NURSE PRACTITIONER

## 2017-07-12 PROCEDURE — 71020 ZZHC CHEST TWO VIEWS, FRONT/LAT: CPT | Mod: TC | Performed by: RADIOLOGY

## 2017-07-12 PROCEDURE — 85025 COMPLETE CBC W/AUTO DIFF WBC: CPT | Performed by: NURSE PRACTITIONER

## 2017-07-12 PROCEDURE — 93000 ELECTROCARDIOGRAM COMPLETE: CPT | Performed by: NURSE PRACTITIONER

## 2017-07-12 PROCEDURE — 81001 URINALYSIS AUTO W/SCOPE: CPT | Performed by: NURSE PRACTITIONER

## 2017-07-12 PROCEDURE — 36415 COLL VENOUS BLD VENIPUNCTURE: CPT | Performed by: NURSE PRACTITIONER

## 2017-07-12 ASSESSMENT — ANXIETY QUESTIONNAIRES
5. BEING SO RESTLESS THAT IT IS HARD TO SIT STILL: NOT AT ALL
1. FEELING NERVOUS, ANXIOUS, OR ON EDGE: NOT AT ALL
GAD7 TOTAL SCORE: 0
7. FEELING AFRAID AS IF SOMETHING AWFUL MIGHT HAPPEN: NOT AT ALL
2. NOT BEING ABLE TO STOP OR CONTROL WORRYING: NOT AT ALL
3. WORRYING TOO MUCH ABOUT DIFFERENT THINGS: NOT AT ALL
6. BECOMING EASILY ANNOYED OR IRRITABLE: NOT AT ALL

## 2017-07-12 ASSESSMENT — PATIENT HEALTH QUESTIONNAIRE - PHQ9: 5. POOR APPETITE OR OVEREATING: NOT AT ALL

## 2017-07-12 NOTE — NURSING NOTE
"Chief Complaint   Patient presents with     Pre-Op Exam     Patient is have a right breast mass excision and a left breast skin tag excision on 7/17/17 by Dr. Hanley at [facility] in [place].     Hepatitis C     Screen Due       Initial /74 (BP Location: Right arm, Patient Position: Sitting, Cuff Size: Adult Regular)  Pulse 72  Temp 97.2  F (36.2  C) (Tympanic)  Resp 14  Ht 5' 1\" (1.549 m)  Wt 124 lb (56.2 kg)  SpO2 94%  Breastfeeding? No  BMI 23.43 kg/m2 Estimated body mass index is 23.43 kg/(m^2) as calculated from the following:    Height as of this encounter: 5' 1\" (1.549 m).    Weight as of this encounter: 124 lb (56.2 kg).  Medication Reconciliation: complete   Kely Traore      "

## 2017-07-12 NOTE — MR AVS SNAPSHOT
After Visit Summary   7/12/2017    Rita Fournier    MRN: [id number]           Patient Information     Date Of Birth          1955        Visit Information        Provider Department      7/12/2017 1:15 PM Vannessa Anderson NP [facility]        Today's Diagnoses     Pre-operative general physical examination    -  1    Preop general physical exam          Care Instructions      Before Your Surgery      Call your surgeon if there is any change in your health. This includes signs of a cold or flu (such as a sore throat, runny nose, cough, rash or fever).    Do not smoke, drink alcohol or take over the counter medicine (unless your surgeon or primary care doctor tells you to) for the 24 hours before and after surgery.    If you take prescribed drugs: Follow your doctor s orders about which medicines to take and which to stop until after surgery.    Eating and drinking prior to surgery: follow the instructions from your surgeon    Take a shower or bath the night before surgery. Use the soap your surgeon gave you to gently clean your skin. If you do not have soap from your surgeon, use your regular soap. Do not shave or scrub the surgery site.  Wear clean pajamas and have clean sheets on your bed.           Follow-ups after your visit        Your next 10 appointments already scheduled     Jul 17, 2017   Procedure with Tianna Hanley MD   [facility] ([address] [address] [phone]              Who to contact     If you have questions or need follow up information about today's clinic visit or your schedule please contact [facility] directly at [phone].  Normal or non-critical lab and imaging results will be communicated to you by MyChart, letter or phone within 4 business days after the clinic has received the results. If you do not hear from us within 7 days, please contact the clinic through [employer] or  "phone. If you have a critical or abnormal lab result, we will notify you by phone as soon as possible.  Submit refill requests through [employer] or call your pharmacy and they will forward the refill request to us. Please allow 3 business days for your refill to be completed.          Additional Information About Your Visit        [employer] Information     [employer] lets you send messages to your doctor, view your test results, renew your prescriptions, schedule appointments and more. To sign up, go to [email or web address] . Click on \"Log in\" on the left side of the screen, which will take you to the Welcome page. Then click on \"Sign up Now\" on the right side of the page.     You will be asked to enter the access code listed below, as well as some personal information. Please follow the directions to create your username and password.     Your access code is: [email or web address]  Expires: 2017  3:19 PM     Your access code will  in 90 days. If you need help or a new code, please call your [facility] clinic or [phone].        Care EveryWhere ID     This is your Care EveryWhere ID. This could be used by other organizations to access your [facility] medical records  [id number]        Your Vitals Were     Pulse Temperature Respirations Height Pulse Oximetry Breastfeeding?    72 97.2  F (36.2  C) (Tympanic) 14 5' 1\" (1.549 m) 94% No    BMI (Body Mass Index)                   23.43 kg/m2            Blood Pressure from Last 3 Encounters:   17 134/74   17 154/68   17 136/78    Weight from Last 3 Encounters:   17 124 lb (56.2 kg)   17 122 lb (55.3 kg)   17 126 lb (57.2 kg)              We Performed the Following     *UA reflex to Microscopic and Culture - [facility]     Basic metabolic panel     CBC with platelets differential     EKG 12-lead complete w/read - (Clinic Performed)     XR CHEST 2 VW (Clinic Performed)        Primary Care Provider Office Phone # Fax #    Vannessa Anderson, " [phone]       [address] [address]        Equal Access to Services     MARIA ALEJANDRA MURDOCK : Hadii aad ku hadcatrachita Burns, yasmeen townsend, jeff umanzor, mirtha arreguin lakiaes meehan. So [facility] [phone].    ATENCIÓN: Si habla español, tiene a soto disposición servicios gratuitos de asistencia lingüística. Llame al [phone].    We comply with applicable federal civil rights laws and [place] laws. We do not discriminate on the basis of race, color, national origin, age, disability sex, sexual orientation or gender identity.            Thank you!     Thank you for choosing [facility]  for your care. Our goal is always to provide you with excellent care. Hearing back from our patients is one way we can continue to improve our services. Please take a few minutes to complete the written survey that you may receive in the mail after your visit with us. Thank you!             Your Updated Medication List - Protect others around you: Learn how to safely use, store and throw away your medicines at www.disposemymeds.org.          This list is accurate as of: 7/12/17  2:01 PM.  Always use your most recent med list.                   Brand Name Dispense Instructions for use Diagnosis    aspirin 81 MG tablet     100 tablet    Take 1 tablet (81 mg) by mouth daily        KRILL OIL PO      Take 800 mg by mouth daily.        OMEGA-3 FISH OIL PO      Take 1 g by mouth daily        triamcinolone 0.1 % ointment    KENALOG    80 g    Apply sparingly to affected area three times daily for 14 days.    Atopic dermatitis, unspecified type       VITAMIN C PO      Take 500 mg by mouth 2 times daily        vitamin D 2000 UNITS tablet     100 tablet    Take 2,000 Units by mouth daily.    Vitamin D deficiency

## 2017-07-12 NOTE — PROGRESS NOTES
[facility]  [address]  [place] [address]  [phone]  Dept: [phone]    PRE-OP EVALUATION:  Today's date: 2017    Rita Fournier (: 1955) presents for pre-operative evaluation assessment as requested by Dr. Hanley.  She requires evaluation and anesthesia risk assessment prior to undergoing surgery/procedure for treatment of right breast mass and left breast skin tag .  Proposed procedure: right breast mass excision and left breast skin tag excision        Date of Surgery/ Procedure: 17  Time of Surgery/ Procedure: to be determined  Hospital/Surgical Facility: [place], [place]  Primary Physician: Vannessa Anderson  Type of Anesthesia Anticipated: General    Patient has a Health Care Directive or Living Will:  NO      1. NO - Do you have a history of heart attack, stroke, stent, bypass or surgery on an artery in the head, neck, heart or legs?  2. NO - Do you ever have any pain or discomfort in your chest?  3. NO - Do you have a history of  Heart Failure?  4. NO - Are you troubled by shortness of breath when: walking on the level, up a slight hill or at night?  5. NO - Do you currently have a cold, bronchitis or other respiratory infection?  6. NO - Do you have a cough, shortness of breath or wheezing?  7. NO - Do you sometimes get pains in the calves of your legs when you walk?  8. NO - Do you or anyone in your family have previous history of blood clots?  9. NO - Do you or does anyone in your family have a serious bleeding problem such as prolonged bleeding following surgeries or cuts?  10. NO - Have you ever had problems with anemia or been told to take iron pills?  11. NO - Have you had any abnormal blood loss such as black, tarry or bloody stools, or abnormal vaginal bleeding?  12. NO - Have you ever had a blood transfusion?  13. NO - Have you or any of your relatives ever had problems with anesthesia?  14. NO - Do you have sleep apnea,  excessive snoring or daytime drowsiness?  15. NO - Do you have any prosthetic heart valves?  16. NO - Do you have prosthetic joints?  17. NO - Is there any chance that you may be pregnant?      HPI:                                                      Brief HPI related to upcoming procedure:   Right breast nipple discharge x6 weeks.  S/P FNA, normal.  Examination by surgeon Tianna Hanley MD, recommends excision of breast lump.      See problem list for active medical problems.  Problems all longstanding and stable, except as noted/documented.  See ROS for pertinent symptoms related to these conditions.                                                                                                  .    MEDICAL HISTORY:                                                      Patient Active Problem List    Diagnosis Date Noted     Vitamin D deficiency 05/07/2013     Priority: Medium     Problem list name updated by automated process. Provider to review and confirm  [employer] Update utility       Dermatitis      Priority: Medium     Alopecia      Priority: Medium     Tobacco abuse 06/27/2011     Priority: Medium      Past Medical History:   Diagnosis Date     Alopecia      Dermatitis      Hyperlipidemia LDL goal < 100      PONV (postoperative nausea and vomiting)      Tobacco abuse 06/27/2011     Vitamin D deficiency 5/7/2013     Past Surgical History:   Procedure Laterality Date     BIOPSY Right 06/15/2017    US guided right breast needle biopsy     CHOLECYSTECTOMY  1975    Cholelithiasis     COLONOSCOPY N/A 1/12/2015    Procedure: COLONOSCOPY;  Surgeon: Tianna Hanley MD;  Location: [facility]     facial redisection gland removal      LEFT > infected neck gland     hysterectomy./partial  1981     Current Outpatient Prescriptions   Medication Sig Dispense Refill     triamcinolone (KENALOG) 0.1 % ointment Apply sparingly to affected area three times daily for 14 days. (Patient not taking: Reported on 6/26/2017) 80 g 1      "aspirin 81 MG tablet Take 1 tablet (81 mg) by mouth daily (Patient not taking: Reported on 6/26/2017) 100 tablet 3     Ascorbic Acid (VITAMIN C PO) Take 500 mg by mouth 2 times daily       Omega-3 Fatty Acids (OMEGA-3 FISH OIL PO) Take 1 g by mouth daily       KRILL OIL PO Take 800 mg by mouth daily.       Cholecalciferol (VITAMIN D) 2000 UNITS tablet Take 2,000 Units by mouth daily. (Patient not taking: Reported on 6/26/2017) 100 tablet 3     OTC products: None, except as noted above    Allergies   Allergen Reactions     Penicillins Hives      Latex Allergy: NO    Social History   Substance Use Topics     Smoking status: Current Every Day Smoker     Packs/day: 1.00     Years: 25.00     Types: Cigarettes     Smokeless tobacco: Never Used      Comment: Tried to Quit (YES); Longest Tobacco Free (\"Cutdown\")     Alcohol use Yes      Comment: OCCASIONALLY     History   Drug Use No       REVIEW OF SYSTEMS:                                                    C: NEGATIVE for fever, chills, change in weight  I: NEGATIVE for worrisome rashes, moles or lesions  E: NEGATIVE for vision changes or irritation  E/M: NEGATIVE for ear, mouth and throat problems  R: NEGATIVE for significant cough or SOB  CV: NEGATIVE for chest pain, palpitations or peripheral edema  GI: NEGATIVE for nausea, abdominal pain, heartburn, or change in bowel habits  : NEGATIVE for frequency, dysuria, or hematuria  M: NEGATIVE for significant arthralgias or myalgia  N: NEGATIVE for weakness, dizziness or paresthesias  E: NEGATIVE for temperature intolerance, skin/hair changes  H: NEGATIVE for bleeding problems  P: NEGATIVE for changes in mood or affect    EXAM:                                                    /74 (BP Location: Right arm, Patient Position: Sitting, Cuff Size: Adult Regular)  Pulse 72  Temp 97.2  F (36.2  C) (Tympanic)  Resp 14  Ht 5' 1\" (1.549 m)  Wt 124 lb (56.2 kg)  SpO2 94%  Breastfeeding? No  BMI 23.43 kg/m2    GENERAL " APPEARANCE: healthy, alert and no distress     EYES: EOMI, PERRL     HENT: ear canals and TM's normal and nose and mouth without ulcers or lesions     NECK: no adenopathy, no asymmetry, masses, or scars and thyroid normal to palpation     RESP: lungs clear to auscultation - no rales, rhonchi or wheezes     CV: regular rates and rhythm, normal S1 S2, no S3 or S4 and no murmur, click or rub     ABDOMEN:  soft, nontender, no HSM or masses and bowel sounds normal     MS: extremities normal- no gross deformities noted, no evidence of inflammation in joints, FROM in all extremities.     NEURO: Normal strength and tone, sensory exam grossly normal, mentation intact and speech normal     PSYCH: mentation appears normal. and affect normal/bright     LYMPHATICS: No axillary, cervical, or supraclavicular nodes    DIAGNOSTICS:                                                      EKG - NSR, will fax  CXR pending radiology review, no gross abnormality noted      Results for orders placed or performed in visit on 07/12/17 (from the past 24 hour(s))   Basic metabolic panel   Result Value Ref Range    Sodium 138 133 - 144 mmol/L    Potassium 4.1 3.4 - 5.3 mmol/L    Chloride 106 94 - 109 mmol/L    Carbon Dioxide 25 20 - 32 mmol/L    Anion Gap 7 3 - 14 mmol/L    Glucose 93 70 - 99 mg/dL    Urea Nitrogen 11 7 - 30 mg/dL    Creatinine 0.61 0.52 - 1.04 mg/dL    GFR Estimate >90  Non  GFR Calc   >60 mL/min/1.7m2    GFR Estimate If Black >90   GFR Calc   >60 mL/min/1.7m2    Calcium 9.3 8.5 - 10.1 mg/dL   CBC with platelets differential   Result Value Ref Range    WBC 6.9 4.0 - 11.0 10e9/L    RBC Count 4.34 3.8 - 5.2 10e12/L    Hemoglobin 15.0 11.7 - 15.7 g/dL    Hematocrit 44.1 35.0 - 47.0 %     (H) 78 - 100 fl    MCH 34.6 (H) 26.5 - 33.0 pg    MCHC 34.0 31.5 - 36.5 g/dL    RDW 12.5 10.0 - 15.0 %    Platelet Count 229 150 - 450 10e9/L    Diff Method Automated Method     % Neutrophils 64.7 %    %  Lymphocytes 22.0 %    % Monocytes 10.3 %    % Eosinophils 2.3 %    % Basophils 0.7 %    Absolute Neutrophil 4.5 1.6 - 8.3 10e9/L    Absolute Lymphocytes 1.5 0.8 - 5.3 10e9/L    Absolute Monocytes 0.7 0.0 - 1.3 10e9/L    Absolute Eosinophils 0.2 0.0 - 0.7 10e9/L    Absolute Basophils 0.1 0.0 - 0.2 10e9/L   *UA reflex to Microscopic and Culture - [facility]   Result Value Ref Range    Color Urine Yellow     Appearance Urine Clear     Glucose Urine Negative NEG mg/dL    Bilirubin Urine Negative NEG    Ketones Urine Negative NEG mg/dL    Specific Gravity Urine 1.020 1.003 - 1.035    Blood Urine Trace (A) NEG    pH Urine 5.5 5.0 - 7.0 pH    Protein Albumin Urine Negative NEG mg/dL    Urobilinogen Urine 0.2 0.2 - 1.0 EU/dL    Nitrite Urine Negative NEG    Leukocyte Esterase Urine Negative NEG    Source Midstream Urine    Urine Microscopic   Result Value Ref Range    WBC Urine O - 2 0 - 2 /HPF    RBC Urine O - 2 0 - 2 /HPF    Squamous Epithelial /LPF Urine Few FEW /LPF       IMPRESSION:                                                    Reason for surgery/procedure: right breast lump, nipple discharge    The proposed surgical procedure is considered LOW risk.    REVISED CARDIAC RISK INDEX  The patient has the following serious cardiovascular risks for perioperative complications such as (MI, PE, VFib and 3  AV Block):  No serious cardiac risks  INTERPRETATION: 0 risks: Class I (very low risk - 0.4% complication rate)    The patient has the following additional risks for perioperative complications:  No identified additional risks      ICD-10-CM    1. Pre-operative general physical examination Z01.818 Basic metabolic panel     CBC with platelets differential     *UA reflex to Microscopic and Culture - [facility]     XR CHEST 2 VW (Clinic Performed)     EKG 12-lead complete w/read - (Clinic Performed)     XR CHEST 2 VW (Clinic Performed)       RECOMMENDATIONS:                                                         APPROVAL GIVEN to proceed with proposed procedure, without further diagnostic evaluation       Signed Electronically by: Vannessa Anderson NP    Copy of this evaluation report is provided to requesting physician.    [place] Preop Guidelines

## 2017-07-13 ASSESSMENT — PATIENT HEALTH QUESTIONNAIRE - PHQ9: SUM OF ALL RESPONSES TO PHQ QUESTIONS 1-9: 0

## 2017-07-13 ASSESSMENT — ANXIETY QUESTIONNAIRES: GAD7 TOTAL SCORE: 0

## 2017-07-14 ENCOUNTER — ANESTHESIA EVENT (OUTPATIENT)
Dept: SURGERY | Facility: HOSPITAL | Age: 62
End: 2017-07-14
Payer: COMMERCIAL

## 2017-07-14 ASSESSMENT — LIFESTYLE VARIABLES: TOBACCO_USE: 1

## 2017-07-14 NOTE — H&P (VIEW-ONLY)
[facility]  [address]  [place] [address]  [phone]  Dept: [phone]    PRE-OP EVALUATION:  Today's date: 2017    Rita Fournier (: 1955) presents for pre-operative evaluation assessment as requested by Dr. Hanley.  She requires evaluation and anesthesia risk assessment prior to undergoing surgery/procedure for treatment of right breast mass and left breast skin tag .  Proposed procedure: right breast mass excision and left breast skin tag excision        Date of Surgery/ Procedure: 17  Time of Surgery/ Procedure: to be determined  Hospital/Surgical Facility: [place], [place]  Primary Physician: Vannessa Anderson  Type of Anesthesia Anticipated: General    Patient has a Health Care Directive or Living Will:  NO      1. NO - Do you have a history of heart attack, stroke, stent, bypass or surgery on an artery in the head, neck, heart or legs?  2. NO - Do you ever have any pain or discomfort in your chest?  3. NO - Do you have a history of  Heart Failure?  4. NO - Are you troubled by shortness of breath when: walking on the level, up a slight hill or at night?  5. NO - Do you currently have a cold, bronchitis or other respiratory infection?  6. NO - Do you have a cough, shortness of breath or wheezing?  7. NO - Do you sometimes get pains in the calves of your legs when you walk?  8. NO - Do you or anyone in your family have previous history of blood clots?  9. NO - Do you or does anyone in your family have a serious bleeding problem such as prolonged bleeding following surgeries or cuts?  10. NO - Have you ever had problems with anemia or been told to take iron pills?  11. NO - Have you had any abnormal blood loss such as black, tarry or bloody stools, or abnormal vaginal bleeding?  12. NO - Have you ever had a blood transfusion?  13. NO - Have you or any of your relatives ever had problems with anesthesia?  14. NO - Do you have sleep apnea,  excessive snoring or daytime drowsiness?  15. NO - Do you have any prosthetic heart valves?  16. NO - Do you have prosthetic joints?  17. NO - Is there any chance that you may be pregnant?      HPI:                                                      Brief HPI related to upcoming procedure:   Right breast nipple discharge x6 weeks.  S/P FNA, normal.  Examination by surgeon Tianna Hanley MD, recommends excision of breast lump.      See problem list for active medical problems.  Problems all longstanding and stable, except as noted/documented.  See ROS for pertinent symptoms related to these conditions.                                                                                                  .    MEDICAL HISTORY:                                                      Patient Active Problem List    Diagnosis Date Noted     Vitamin D deficiency 05/07/2013     Priority: Medium     Problem list name updated by automated process. Provider to review and confirm  [employer] Update utility       Dermatitis      Priority: Medium     Alopecia      Priority: Medium     Tobacco abuse 06/27/2011     Priority: Medium      Past Medical History:   Diagnosis Date     Alopecia      Dermatitis      Hyperlipidemia LDL goal < 100      PONV (postoperative nausea and vomiting)      Tobacco abuse 06/27/2011     Vitamin D deficiency 5/7/2013     Past Surgical History:   Procedure Laterality Date     BIOPSY Right 06/15/2017    US guided right breast needle biopsy     CHOLECYSTECTOMY  1975    Cholelithiasis     COLONOSCOPY N/A 1/12/2015    Procedure: COLONOSCOPY;  Surgeon: Tianna Hanley MD;  Location: [facility] OR     facial redisection gland removal      LEFT > infected neck gland     hysterectomy./partial  1981     Current Outpatient Prescriptions   Medication Sig Dispense Refill     triamcinolone (KENALOG) 0.1 % ointment Apply sparingly to affected area three times daily for 14 days. (Patient not taking: Reported on 6/26/2017) 80 g 1      "aspirin 81 MG tablet Take 1 tablet (81 mg) by mouth daily (Patient not taking: Reported on 6/26/2017) 100 tablet 3     Ascorbic Acid (VITAMIN C PO) Take 500 mg by mouth 2 times daily       Omega-3 Fatty Acids (OMEGA-3 FISH OIL PO) Take 1 g by mouth daily       KRILL OIL PO Take 800 mg by mouth daily.       Cholecalciferol (VITAMIN D) 2000 UNITS tablet Take 2,000 Units by mouth daily. (Patient not taking: Reported on 6/26/2017) 100 tablet 3     OTC products: None, except as noted above    Allergies   Allergen Reactions     Penicillins Hives      Latex Allergy: NO    Social History   Substance Use Topics     Smoking status: Current Every Day Smoker     Packs/day: 1.00     Years: 25.00     Types: Cigarettes     Smokeless tobacco: Never Used      Comment: Tried to Quit (YES); Longest Tobacco Free (\"Cutdown\")     Alcohol use Yes      Comment: OCCASIONALLY     History   Drug Use No       REVIEW OF SYSTEMS:                                                    C: NEGATIVE for fever, chills, change in weight  I: NEGATIVE for worrisome rashes, moles or lesions  E: NEGATIVE for vision changes or irritation  E/M: NEGATIVE for ear, mouth and throat problems  R: NEGATIVE for significant cough or SOB  CV: NEGATIVE for chest pain, palpitations or peripheral edema  GI: NEGATIVE for nausea, abdominal pain, heartburn, or change in bowel habits  : NEGATIVE for frequency, dysuria, or hematuria  M: NEGATIVE for significant arthralgias or myalgia  N: NEGATIVE for weakness, dizziness or paresthesias  E: NEGATIVE for temperature intolerance, skin/hair changes  H: NEGATIVE for bleeding problems  P: NEGATIVE for changes in mood or affect    EXAM:                                                    /74 (BP Location: Right arm, Patient Position: Sitting, Cuff Size: Adult Regular)  Pulse 72  Temp 97.2  F (36.2  C) (Tympanic)  Resp 14  Ht 5' 1\" (1.549 m)  Wt 124 lb (56.2 kg)  SpO2 94%  Breastfeeding? No  BMI 23.43 kg/m2    GENERAL " APPEARANCE: healthy, alert and no distress     EYES: EOMI, PERRL     HENT: ear canals and TM's normal and nose and mouth without ulcers or lesions     NECK: no adenopathy, no asymmetry, masses, or scars and thyroid normal to palpation     RESP: lungs clear to auscultation - no rales, rhonchi or wheezes     CV: regular rates and rhythm, normal S1 S2, no S3 or S4 and no murmur, click or rub     ABDOMEN:  soft, nontender, no HSM or masses and bowel sounds normal     MS: extremities normal- no gross deformities noted, no evidence of inflammation in joints, FROM in all extremities.     NEURO: Normal strength and tone, sensory exam grossly normal, mentation intact and speech normal     PSYCH: mentation appears normal. and affect normal/bright     LYMPHATICS: No axillary, cervical, or supraclavicular nodes    DIAGNOSTICS:                                                      EKG - NSR, will fax  CXR pending radiology review, no gross abnormality noted      Results for orders placed or performed in visit on 07/12/17 (from the past 24 hour(s))   Basic metabolic panel   Result Value Ref Range    Sodium 138 133 - 144 mmol/L    Potassium 4.1 3.4 - 5.3 mmol/L    Chloride 106 94 - 109 mmol/L    Carbon Dioxide 25 20 - 32 mmol/L    Anion Gap 7 3 - 14 mmol/L    Glucose 93 70 - 99 mg/dL    Urea Nitrogen 11 7 - 30 mg/dL    Creatinine 0.61 0.52 - 1.04 mg/dL    GFR Estimate >90  Non  GFR Calc   >60 mL/min/1.7m2    GFR Estimate If Black >90   GFR Calc   >60 mL/min/1.7m2    Calcium 9.3 8.5 - 10.1 mg/dL   CBC with platelets differential   Result Value Ref Range    WBC 6.9 4.0 - 11.0 10e9/L    RBC Count 4.34 3.8 - 5.2 10e12/L    Hemoglobin 15.0 11.7 - 15.7 g/dL    Hematocrit 44.1 35.0 - 47.0 %     (H) 78 - 100 fl    MCH 34.6 (H) 26.5 - 33.0 pg    MCHC 34.0 31.5 - 36.5 g/dL    RDW 12.5 10.0 - 15.0 %    Platelet Count 229 150 - 450 10e9/L    Diff Method Automated Method     % Neutrophils 64.7 %    %  Lymphocytes 22.0 %    % Monocytes 10.3 %    % Eosinophils 2.3 %    % Basophils 0.7 %    Absolute Neutrophil 4.5 1.6 - 8.3 10e9/L    Absolute Lymphocytes 1.5 0.8 - 5.3 10e9/L    Absolute Monocytes 0.7 0.0 - 1.3 10e9/L    Absolute Eosinophils 0.2 0.0 - 0.7 10e9/L    Absolute Basophils 0.1 0.0 - 0.2 10e9/L   *UA reflex to Microscopic and Culture - [facility]   Result Value Ref Range    Color Urine Yellow     Appearance Urine Clear     Glucose Urine Negative NEG mg/dL    Bilirubin Urine Negative NEG    Ketones Urine Negative NEG mg/dL    Specific Gravity Urine 1.020 1.003 - 1.035    Blood Urine Trace (A) NEG    pH Urine 5.5 5.0 - 7.0 pH    Protein Albumin Urine Negative NEG mg/dL    Urobilinogen Urine 0.2 0.2 - 1.0 EU/dL    Nitrite Urine Negative NEG    Leukocyte Esterase Urine Negative NEG    Source Midstream Urine    Urine Microscopic   Result Value Ref Range    WBC Urine O - 2 0 - 2 /HPF    RBC Urine O - 2 0 - 2 /HPF    Squamous Epithelial /LPF Urine Few FEW /LPF       IMPRESSION:                                                    Reason for surgery/procedure: right breast lump, nipple discharge    The proposed surgical procedure is considered LOW risk.    REVISED CARDIAC RISK INDEX  The patient has the following serious cardiovascular risks for perioperative complications such as (MI, PE, VFib and 3  AV Block):  No serious cardiac risks  INTERPRETATION: 0 risks: Class I (very low risk - 0.4% complication rate)    The patient has the following additional risks for perioperative complications:  No identified additional risks      ICD-10-CM    1. Pre-operative general physical examination Z01.818 Basic metabolic panel     CBC with platelets differential     *UA reflex to Microscopic and Culture - [facility]     XR CHEST 2 VW (Clinic Performed)     EKG 12-lead complete w/read - (Clinic Performed)     XR CHEST 2 VW (Clinic Performed)       RECOMMENDATIONS:                                                         APPROVAL GIVEN to proceed with proposed procedure, without further diagnostic evaluation       Signed Electronically by: Vannessa Anderson NP    Copy of this evaluation report is provided to requesting physician.    [facility] Preop Guidelines

## 2017-07-14 NOTE — ANESTHESIA PREPROCEDURE EVALUATION
Anesthesia Evaluation     . Pt has had prior anesthetic. Type: General    History of anesthetic complications   - PONV        ROS/MED HX    ENT/Pulmonary:     (+)tobacco use, Current use 1/2ppd packs/day  , . .    Neurologic:  - neg neurologic ROS     Cardiovascular:     (+) Dyslipidemia, ----. : . . . :. .       METS/Exercise Tolerance:     Hematologic:  - neg hematologic  ROS       Musculoskeletal:  - neg musculoskeletal ROS       GI/Hepatic:  - neg GI/hepatic ROS       Renal/Genitourinary:  - ROS Renal section negative       Endo:         Psychiatric:  - neg psychiatric ROS       Infectious Disease:  - neg infectious disease ROS       Malignancy:      - no malignancy   Other:    - neg other ROS                 Physical Exam  Normal systems: dental    Airway   Mallampati: II  TM distance: >3 FB  Neck ROM: full    Dental     Cardiovascular   Rhythm and rate: regular and normal      Pulmonary    breath sounds clear to auscultation                    Anesthesia Plan      History & Physical Review  History and physical reviewed and following examination; no interval change.    ASA Status:  3 .    NPO Status:  > 8 hours    Plan for General and LMA with Propofol induction.   PONV prophylaxis:  Ondansetron (or other 5HT-3)       Postoperative Care      Consents  Anesthetic plan, risks, benefits and alternatives discussed with:  Patient and Spouse..                          .

## 2017-07-17 ENCOUNTER — HOSPITAL ENCOUNTER (OUTPATIENT)
Facility: HOSPITAL | Age: 62
Discharge: HOME | End: 2017-07-17
Attending: SURGERY | Admitting: SURGERY
Payer: COMMERCIAL

## 2017-07-17 ENCOUNTER — SURGERY (OUTPATIENT)
Age: 62
End: 2017-07-17

## 2017-07-17 ENCOUNTER — ANESTHESIA (OUTPATIENT)
Dept: SURGERY | Facility: HOSPITAL | Age: 62
End: 2017-07-17
Payer: COMMERCIAL

## 2017-07-17 ENCOUNTER — HOSPITAL PATHOLOGY (OUTPATIENT)
Dept: OTHER | Facility: CLINIC | Age: 62
End: 2017-07-17

## 2017-07-17 VITALS
RESPIRATION RATE: 16 BRPM | HEART RATE: 85 BPM | DIASTOLIC BLOOD PRESSURE: 86 MMHG | BODY MASS INDEX: 23.41 KG/M2 | WEIGHT: 124 LBS | HEIGHT: 61 IN | OXYGEN SATURATION: 94 % | SYSTOLIC BLOOD PRESSURE: 155 MMHG | TEMPERATURE: 98.3 F

## 2017-07-17 DIAGNOSIS — Z98.890 STATUS POST BREAST BIOPSY: Primary | ICD-10-CM

## 2017-07-17 PROCEDURE — 25000125 ZZHC RX 250: Performed by: ANESTHESIOLOGY

## 2017-07-17 PROCEDURE — 27110028 ZZH OR GENERAL SUPPLY NON-STERILE: Performed by: SURGERY

## 2017-07-17 PROCEDURE — 25000128 H RX IP 250 OP 636: Performed by: NURSE ANESTHETIST, CERTIFIED REGISTERED

## 2017-07-17 PROCEDURE — 01999 UNLISTED ANES PROCEDURE: CPT | Performed by: NURSE ANESTHETIST, CERTIFIED REGISTERED

## 2017-07-17 PROCEDURE — 25000566 ZZH SEVOFLURANE, EA 15 MIN: Performed by: ANESTHESIOLOGY

## 2017-07-17 PROCEDURE — 19120 REMOVAL OF BREAST LESION: CPT | Mod: RT | Performed by: SURGERY

## 2017-07-17 PROCEDURE — 25000132 ZZH RX MED GY IP 250 OP 250 PS 637: Performed by: SURGERY

## 2017-07-17 PROCEDURE — 25000128 H RX IP 250 OP 636: Performed by: ANESTHESIOLOGY

## 2017-07-17 PROCEDURE — 88360 TUMOR IMMUNOHISTOCHEM/MANUAL: CPT | Mod: TC | Performed by: SURGERY

## 2017-07-17 PROCEDURE — 25000125 ZZHC RX 250: Performed by: NURSE ANESTHETIST, CERTIFIED REGISTERED

## 2017-07-17 PROCEDURE — 88307 TISSUE EXAM BY PATHOLOGIST: CPT | Mod: TC | Performed by: SURGERY

## 2017-07-17 PROCEDURE — 40000306 ZZH STATISTIC PRE PROC ASSESS II: Performed by: SURGERY

## 2017-07-17 PROCEDURE — 88304 TISSUE EXAM BY PATHOLOGIST: CPT | Mod: TC | Performed by: SURGERY

## 2017-07-17 PROCEDURE — 27210995 ZZH RX 272: Performed by: SURGERY

## 2017-07-17 PROCEDURE — 37000009 ZZH ANESTHESIA TECHNICAL FEE, EACH ADDTL 15 MIN: Performed by: SURGERY

## 2017-07-17 PROCEDURE — 19101 BIOPSY OF BREAST OPEN: CPT | Performed by: ANESTHESIOLOGY

## 2017-07-17 PROCEDURE — 27210794 ZZH OR GENERAL SUPPLY STERILE: Performed by: SURGERY

## 2017-07-17 PROCEDURE — 88342 IMHCHEM/IMCYTCHM 1ST ANTB: CPT | Mod: TC | Performed by: SURGERY

## 2017-07-17 PROCEDURE — 25000125 ZZHC RX 250: Performed by: SURGERY

## 2017-07-17 PROCEDURE — 25000128 H RX IP 250 OP 636: Performed by: SURGERY

## 2017-07-17 PROCEDURE — 11200 RMVL SKIN TAGS UP TO&INC 15: CPT | Performed by: SURGERY

## 2017-07-17 PROCEDURE — 36000050 ZZH SURGERY LEVEL 2 1ST 30 MIN: Performed by: SURGERY

## 2017-07-17 PROCEDURE — 71000027 ZZH RECOVERY PHASE 2 EACH 15 MINS: Performed by: SURGERY

## 2017-07-17 PROCEDURE — 36000052 ZZH SURGERY LEVEL 2 EA 15 ADDTL MIN: Performed by: SURGERY

## 2017-07-17 PROCEDURE — 37000008 ZZH ANESTHESIA TECHNICAL FEE, 1ST 30 MIN: Performed by: SURGERY

## 2017-07-17 PROCEDURE — 71000014 ZZH RECOVERY PHASE 1 LEVEL 2 FIRST HR: Performed by: SURGERY

## 2017-07-17 RX ORDER — SODIUM CHLORIDE, SODIUM LACTATE, POTASSIUM CHLORIDE, CALCIUM CHLORIDE 600; 310; 30; 20 MG/100ML; MG/100ML; MG/100ML; MG/100ML
INJECTION, SOLUTION INTRAVENOUS CONTINUOUS
Status: DISCONTINUED | OUTPATIENT
Start: 2017-07-17 | End: 2017-07-17 | Stop reason: HOSPADM

## 2017-07-17 RX ORDER — ONDANSETRON 4 MG/1
4 TABLET, ORALLY DISINTEGRATING ORAL EVERY 30 MIN PRN
Status: DISCONTINUED | OUTPATIENT
Start: 2017-07-17 | End: 2017-07-17 | Stop reason: HOSPADM

## 2017-07-17 RX ORDER — LIDOCAINE HYDROCHLORIDE 20 MG/ML
INJECTION, SOLUTION INFILTRATION; PERINEURAL PRN
Status: DISCONTINUED | OUTPATIENT
Start: 2017-07-17 | End: 2017-07-17

## 2017-07-17 RX ORDER — SCOLOPAMINE TRANSDERMAL SYSTEM 1 MG/1
PATCH, EXTENDED RELEASE TRANSDERMAL
Status: DISCONTINUED
Start: 2017-07-17 | End: 2017-07-17 | Stop reason: HOSPADM

## 2017-07-17 RX ORDER — CEFAZOLIN SODIUM 2 G/100ML
2 INJECTION, SOLUTION INTRAVENOUS
Status: COMPLETED | OUTPATIENT
Start: 2017-07-17 | End: 2017-07-17

## 2017-07-17 RX ORDER — HYDROCODONE BITARTRATE AND ACETAMINOPHEN 5; 325 MG/1; MG/1
1 TABLET ORAL EVERY 4 HOURS PRN
Qty: 18 TABLET | Refills: 0 | Status: SHIPPED | OUTPATIENT
Start: 2017-07-17 | End: 2017-09-06

## 2017-07-17 RX ORDER — ONDANSETRON 2 MG/ML
4 INJECTION INTRAMUSCULAR; INTRAVENOUS EVERY 30 MIN PRN
Status: DISCONTINUED | OUTPATIENT
Start: 2017-07-17 | End: 2017-07-17 | Stop reason: HOSPADM

## 2017-07-17 RX ORDER — LIDOCAINE 40 MG/G
CREAM TOPICAL
Status: DISCONTINUED | OUTPATIENT
Start: 2017-07-17 | End: 2017-07-17 | Stop reason: HOSPADM

## 2017-07-17 RX ORDER — ONDANSETRON 2 MG/ML
INJECTION INTRAMUSCULAR; INTRAVENOUS PRN
Status: DISCONTINUED | OUTPATIENT
Start: 2017-07-17 | End: 2017-07-17

## 2017-07-17 RX ORDER — DEXAMETHASONE SODIUM PHOSPHATE 10 MG/ML
INJECTION, SOLUTION INTRAMUSCULAR; INTRAVENOUS PRN
Status: DISCONTINUED | OUTPATIENT
Start: 2017-07-17 | End: 2017-07-17

## 2017-07-17 RX ORDER — PROPOFOL 10 MG/ML
INJECTION, EMULSION INTRAVENOUS PRN
Status: DISCONTINUED | OUTPATIENT
Start: 2017-07-17 | End: 2017-07-17

## 2017-07-17 RX ORDER — MEPERIDINE HYDROCHLORIDE 25 MG/ML
12.5 INJECTION INTRAMUSCULAR; INTRAVENOUS; SUBCUTANEOUS
Status: DISCONTINUED | OUTPATIENT
Start: 2017-07-17 | End: 2017-07-17 | Stop reason: HOSPADM

## 2017-07-17 RX ORDER — FENTANYL CITRATE 50 UG/ML
25-50 INJECTION, SOLUTION INTRAMUSCULAR; INTRAVENOUS EVERY 5 MIN PRN
Status: DISCONTINUED | OUTPATIENT
Start: 2017-07-17 | End: 2017-07-17 | Stop reason: HOSPADM

## 2017-07-17 RX ORDER — HYDROCODONE BITARTRATE AND ACETAMINOPHEN 5; 325 MG/1; MG/1
1 TABLET ORAL ONCE
Status: COMPLETED | OUTPATIENT
Start: 2017-07-17 | End: 2017-07-17

## 2017-07-17 RX ORDER — NALOXONE HYDROCHLORIDE 0.4 MG/ML
.1-.4 INJECTION, SOLUTION INTRAMUSCULAR; INTRAVENOUS; SUBCUTANEOUS
Status: DISCONTINUED | OUTPATIENT
Start: 2017-07-17 | End: 2017-07-17 | Stop reason: HOSPADM

## 2017-07-17 RX ORDER — FENTANYL CITRATE 50 UG/ML
INJECTION, SOLUTION INTRAMUSCULAR; INTRAVENOUS PRN
Status: DISCONTINUED | OUTPATIENT
Start: 2017-07-17 | End: 2017-07-17

## 2017-07-17 RX ORDER — BACITRACIN ZINC 500 [USP'U]/G
OINTMENT TOPICAL PRN
Status: DISCONTINUED | OUTPATIENT
Start: 2017-07-17 | End: 2017-07-17 | Stop reason: HOSPADM

## 2017-07-17 RX ORDER — SCOLOPAMINE TRANSDERMAL SYSTEM 1 MG/1
1 PATCH, EXTENDED RELEASE TRANSDERMAL
Status: DISCONTINUED | OUTPATIENT
Start: 2017-07-17 | End: 2017-07-17 | Stop reason: HOSPADM

## 2017-07-17 RX ADMIN — CEFAZOLIN 500 ML GIVEN: 1 INJECTION, POWDER, FOR SOLUTION INTRAMUSCULAR; INTRAVENOUS at 14:04

## 2017-07-17 RX ADMIN — SODIUM CHLORIDE, POTASSIUM CHLORIDE, SODIUM LACTATE AND CALCIUM CHLORIDE: 600; 310; 30; 20 INJECTION, SOLUTION INTRAVENOUS at 13:08

## 2017-07-17 RX ADMIN — SCOPALAMINE 1 PATCH: 1 PATCH, EXTENDED RELEASE TRANSDERMAL at 13:04

## 2017-07-17 RX ADMIN — FENTANYL CITRATE 50 MCG: 50 INJECTION, SOLUTION INTRAMUSCULAR; INTRAVENOUS at 14:53

## 2017-07-17 RX ADMIN — FENTANYL CITRATE 50 MCG: 50 INJECTION, SOLUTION INTRAMUSCULAR; INTRAVENOUS at 14:47

## 2017-07-17 RX ADMIN — FENTANYL CITRATE 50 MCG: 50 INJECTION, SOLUTION INTRAMUSCULAR; INTRAVENOUS at 13:27

## 2017-07-17 RX ADMIN — Medication 1 G: at 14:18

## 2017-07-17 RX ADMIN — FENTANYL CITRATE 50 MCG: 50 INJECTION, SOLUTION INTRAMUSCULAR; INTRAVENOUS at 15:00

## 2017-07-17 RX ADMIN — MIDAZOLAM HYDROCHLORIDE 1 MG: 1 INJECTION, SOLUTION INTRAMUSCULAR; INTRAVENOUS at 13:27

## 2017-07-17 RX ADMIN — PROPOFOL 130 MG: 10 INJECTION, EMULSION INTRAVENOUS at 13:27

## 2017-07-17 RX ADMIN — MIDAZOLAM HYDROCHLORIDE 1 MG: 1 INJECTION, SOLUTION INTRAMUSCULAR; INTRAVENOUS at 13:20

## 2017-07-17 RX ADMIN — ONDANSETRON 4 MG: 2 INJECTION INTRAMUSCULAR; INTRAVENOUS at 14:03

## 2017-07-17 RX ADMIN — FENTANYL CITRATE 50 MCG: 50 INJECTION, SOLUTION INTRAMUSCULAR; INTRAVENOUS at 13:20

## 2017-07-17 RX ADMIN — FENTANYL CITRATE 50 MCG: 50 INJECTION, SOLUTION INTRAMUSCULAR; INTRAVENOUS at 15:10

## 2017-07-17 RX ADMIN — CEFAZOLIN SODIUM 2 G: 2 INJECTION, SOLUTION INTRAVENOUS at 13:20

## 2017-07-17 RX ADMIN — HYDROCODONE BITARTRATE AND ACETAMINOPHEN 1 TABLET: 5; 325 TABLET ORAL at 16:07

## 2017-07-17 RX ADMIN — LIDOCAINE HYDROCHLORIDE 40 MG: 20 INJECTION, SOLUTION INFILTRATION; PERINEURAL at 13:27

## 2017-07-17 RX ADMIN — DEXAMETHASONE SODIUM PHOSPHATE 10 MG: 10 INJECTION, SOLUTION INTRAMUSCULAR; INTRAVENOUS at 14:03

## 2017-07-17 NOTE — ANESTHESIA CARE TRANSFER NOTE
Patient: Rita Fournier    Procedure(s):  EXCISIONAL BIOPSY RIGHT BREAST MASS, EXCISION LEFT BREAST SKIN TAG - Wound Class: I-Clean    Diagnosis: BREAST MASS RT  Diagnosis Additional Information: No value filed.    Anesthesia Type:   General, LMA     Note:  Airway :Nasal Cannula  Patient transferred to:PACU        Vitals: (Last set prior to Anesthesia Care Transfer)    CRNA VITALS  7/17/2017 1358 - 7/17/2017 1451      7/17/2017             Resp Rate (observed): (!)  7    Resp Rate (set): (!)  7                Electronically Signed By: MEMO Mcnamara CRNA  July 17, 2017  2:51 PM

## 2017-07-17 NOTE — OR NURSING
Pateint discharged to [facility].  Srinivasa score 9/10. Pain level 3/10.  Discharged from unit via cart.  Hand off report given to same nurse.

## 2017-07-17 NOTE — INTERVAL H&P NOTE
History and physical reviewed. Patient examined. There is no interval change in condition since examined by me 6-26-17.    Tianna Hanley MD, FACS    7/17/2017  12:18 PM

## 2017-07-17 NOTE — DISCHARGE INSTRUCTIONS
Post-Anesthesia Patient Instructions    IMMEDIATELY FOLLOWING SURGERY:  Do not drive or operate machinery for the first twenty four hours after surgery.  Do not make any important decisions for twenty four hours after surgery or while taking narcotic pain medications or sedatives.  If you develop intractable nausea and vomiting or a severe headache please notify your doctor immediately.    FOLLOW-UP:  Please make an appointment with your surgeon as instructed. You do not need to follow up with anesthesia unless specifically instructed to do so.    WOUND CARE INSTRUCTIONS (if applicable):  Keep a dry clean dressing on the anesthesia/puncture wound site if there is drainage.  Once the wound has quit draining you may leave it open to air.  Generally you should leave the bandage intact for twenty four hours unless there is drainage.  If the epidural site drains for more than 36-48 hours please call the anesthesia department.    QUESTIONS?:  Please feel free to call your physician or the hospital  if you have any questions, and they will be happy to assist you.   Thank you for allowing Dr Hanley and our surgical team to participate in your care. Please call with any questions or concerns to our direct line at [phone] or surgery scheduling at [phone]    Leave dressing in place until Thursday morning 7/20/17.  You may then remove but leave steri strips on skin.  Following this dressing removal, you may shower.  Do not directly soap or scrub incision site.  NO tub baths, hot tub use or swimming x 2-3 weeks.    Bra support 24 hrs/day for 2-3 weeks to maximize cosmetic result.    Return appointment in clinic in one week for wound check and to go over pathology report.

## 2017-07-17 NOTE — OP NOTE
PREOPERATIVE DIAGNOSIS:   Nipple discharge, right breast.   Skin tag left medial areola     POSTOPERATIVE DIAGNOSIS:   Dilated ducts, right subareolar region  Skin tag left areola, medial     PROCEDURE:    Lacrimal probe identification, dilated duct right breast.  Excisional biopsy of right breast mass - subareolar  Excision, skin tag, left breast.      INDICATIONS:  This 61 year old female developed right bloody nipple discharge.  Ultrasound showed a suggestion of intraductal papilloma although image guided biopsy showed fibrocystic change.  She is brought for definitive excision.     DESCRIPTION OF PROCEDURE:  With the patient in the supine position on the operating table, general anesthesia was induced.  The right breast and chest wall were prepped and draped sterilely along with the left breast, areolar-nipple complex.  A autumn noting the right as correct side of breast biopsy procedure that was placed preoperatively was visualized during the requisite timeout pause as was the left sided skin tag and the patient's correct identity and planned procedures were confirmed.   A curvilinear incision was made along the inferior and lateral aspect of the areolar border and carried through full thickness skin and subcutaneous tissue with bleeding controlled by electrocautery and suture ligatures of 3-0 Vicryl.  The nipple areolar complex was elevated to the limit of dermal vascularity and the dilated duct with lacrimal probe identified and marked with a vicryl suture.  The subareolar tissues with fibrotic change and dilated ducts were grasped with an Allis clamp and dissected from surrounding breast parenchyma using sharp and electrocautery dissection.  The mass was excised in toto, sutures placed for orientation and submitted fresh to pathology.  Additional subareolar tissue that was resected to complete the dissection and this was submitted separately as additional subareolar tissue.  The wound was irrigated with  antibiotic/saline solution and digital palpation confirmed no further gross abnormality.  The wound was packed and attention was directed to the skin tag which was excised with a 1.0 cm ellipse.  This wound was irrigated and closed with a 4-0 nylon simple suture.      Attention was redirected to the right breast; the packing was removed and additional hemostasis obtained with 3-0 vicryl suture ligatures and electrocautery.    After a final irrigation with antibiotic/saline solution and confirmation that hemostasis was adequate, the wound was closed in layers with interrupted 3-0 Vicryl in the subcutaneous tissue.  The skin was reapproximated with subcuticular 3-0 Vicryl .  A sterile compressive dressing was applied and bacitracin and bandaid placed on the left skin tag excision site.  The patient was transported to the recovery room in satisfactory condition.  The estimated blood loss was  1 cc and  there were no apparent complications; the procedures were well tolerated and the patient left the operating room in good condition following notification that the final sponge, needle and instrument counts were correct.     The post surgical debriefing was held and acknowledged with specimen identifications confirmed.    Tianna Kat MD, FACS    7/17/2017  2:28 PM            TIANNA KAT MD, FACS

## 2017-07-17 NOTE — ANESTHESIA POSTPROCEDURE EVALUATION
Patient: Rita Fournier    Procedure(s):  EXCISIONAL BIOPSY RIGHT BREAST MASS, EXCISION LEFT BREAST SKIN TAG - Wound Class: I-Clean    Diagnosis:BREAST MASS RT  Diagnosis Additional Information: No value filed.    Anesthesia Type:  General, LMA    Note:  Anesthesia Post Evaluation    Patient location during evaluation: PACU  Patient participation: Able to fully participate in evaluation  Level of consciousness: awake and alert  Pain management: adequate  Airway patency: patent  Cardiovascular status: acceptable  Respiratory status: acceptable  Hydration status: acceptable  PONV: none     Anesthetic complications: None          Last vitals:  Vitals:    07/17/17 1455 07/17/17 1502 07/17/17 1504   BP: 153/80 137/77 137/77   Pulse:      Resp: 21 (!) 8 14   Temp:      SpO2: 96% (!) 87% 93%         Electronically Signed By: Franky Hernandez MD  July 17, 2017  3:15 PM

## 2017-07-17 NOTE — IP AVS SNAPSHOT
[facility]    [address] [address]    Phone:  [phone]                                       After Visit Summary   7/17/2017    Rita Fournier    MRN: [id number]           After Visit Summary Signature Page     I have received my discharge instructions, and my questions have been answered. I have discussed any challenges I see with this plan with the nurse or doctor.    ..........................................................................................................................................  Patient/Patient Representative Signature      ..........................................................................................................................................  Patient Representative Print Name and Relationship to Patient    ..................................................               ................................................  Date                                            Time    ..........................................................................................................................................  Reviewed by Signature/Title    ...................................................              ..............................................  Date                                                            Time

## 2017-07-17 NOTE — OR NURSING
Patient and responsible adult given discharge instructions with no questions regarding instructions. Srinivasa score 19/20. Pain level 3/10.  Discharged from unit via wheelchair. Patient discharged to home.

## 2017-07-17 NOTE — PROVIDER NOTIFICATION
Oxygen saturation 87-89% on room air, pt reports no changes, no dyspnea, encouraged pt to deep breath, no changes in oxygen sat after deep breathing, oxygen via nasal cannula replaced @ 1liter .

## 2017-07-17 NOTE — IP AVS SNAPSHOT
MRN:[id number]                      After Visit Summary   7/17/2017    Rita Fournier    MRN: [id number]           Thank you!     Thank you for choosing [facility] for your care. Our goal is always to provide you with excellent care. Hearing back from our patients is one way we can continue to improve our services. Please take a few minutes to complete the written survey that you may receive in the mail after you visit with us. Thank you!        Patient Information     Date Of Birth          1955        About your hospital stay     You were admitted on:  July 17, 2017 You last received care in the:  [facility] Preop/Phase II    You were discharged on:  July 17, 2017       Who to Call     For medical emergencies, please call 911.  For non-urgent questions about your medical care, please call your primary care provider or clinic, [phone]  For questions related to your surgery, please call your surgery clinic        Attending Provider     Provider Specialty    Tianna Hanley MD General Surgery       Primary Care Provider Office Phone # Fax #    Vannessa AndersonKASEY [phone]      Your next 10 appointments already scheduled     Jul 25, 2017 11:30 AM CDT   (Arrive by 11:15 AM)   Post Op with Tianna [facility] MD   [facility] ([facility] )    [address] [place] [address]  [place] [address]   [phone]              Further instructions from your care team           Post-Anesthesia Patient Instructions    IMMEDIATELY FOLLOWING SURGERY:  Do not drive or operate machinery for the first twenty four hours after surgery.  Do not make any important decisions for twenty four hours after surgery or while taking narcotic pain medications or sedatives.  If you develop intractable nausea and vomiting or a severe headache please notify your doctor immediately.    FOLLOW-UP:  Please make an appointment with your surgeon as instructed. You do not need to follow up with  "anesthesia unless specifically instructed to do so.    WOUND CARE INSTRUCTIONS (if applicable):  Keep a dry clean dressing on the anesthesia/puncture wound site if there is drainage.  Once the wound has quit draining you may leave it open to air.  Generally you should leave the bandage intact for twenty four hours unless there is drainage.  If the epidural site drains for more than 36-48 hours please call the anesthesia department.    QUESTIONS?:  Please feel free to call your physician or the hospital  if you have any questions, and they will be happy to assist you.   Thank you for allowing Dr Hanley and our surgical team to participate in your care. Please call with any questions or concerns to our direct line at [phone] or surgery scheduling at [phone]    Leave dressing in place until Thursday morning 7/20/17.  You may then remove but leave steri strips on skin.  Following this dressing removal, you may shower.  Do not directly soap or scrub incision site.  NO tub baths, hot tub use or swimming x 2-3 weeks.    Bra support 24 hrs/day for 2-3 weeks to maximize cosmetic result.    Return appointment in clinic in one week for wound check and to go over pathology report.    Pending Results     No orders found from 7/15/2017 to 7/18/2017.            Admission Information     Date & Time Provider Department Dept. Phone    7/17/2017 Tianna Hanley MD HI Preop/Phase [phone]      Your Vitals Were     Blood Pressure Pulse Temperature Respirations Height Weight    144/69 85 98.3  F (36.8  C) (Temporal) 12 1.549 m (5' 1\") 56.2 kg (124 lb)    Pulse Oximetry BMI (Body Mass Index)                92% 23.43 kg/m2          [employer] Information     [employer] lets you send messages to your doctor, view your test results, renew your prescriptions, schedule appointments and more. To sign up, go to [email or web address] . Click on \"Log in\" on the left side of the screen, which will take you to the Welcome page. " "Then click on \"Sign up Now\" on the right side of the page.     You will be asked to enter the access code listed below, as well as some personal information. Please follow the directions to create your username and password.     Your access code is: [email or web address]  Expires: 2017  3:19 PM     Your access code will  in 90 days. If you need help or a new code, please call your [facility] clinic or [phone].        Care EveryWhere ID     This is your Care EveryWhere ID. This could be used by other organizations to access your [facility] medical records  [id number]        Equal Access to Services     [facility] : Paco Burns, yasmeen townsend, jeff umanzor, mirtha meehan. So [facility] [phone].    ATENCIÓN: Si habla español, tiene a soto disposición servicios gratuitos de asistencia lingüística. Ll[facility] [phone].    We comply with applicable federal civil rights laws and [place] laws. We do not discriminate on the basis of race, color, national origin, age, disability sex, sexual orientation or gender identity.               Review of your medicines      START taking        Dose / Directions    HYDROcodone-acetaminophen 5-325 MG per tablet   Commonly known as:  NORCO   Used for:  Status post breast biopsy        Dose:  1 tablet   Take 1 tablet by mouth every 4 hours as needed for pain maximum 6 tablet(s) per day   Quantity:  18 tablet   Refills:  0         CONTINUE these medicines which have NOT CHANGED        Dose / Directions    aspirin 81 MG tablet        Dose:  81 mg   Take 1 tablet (81 mg) by mouth daily   Quantity:  100 tablet   Refills:  3       KRILL OIL PO        Dose:  800 mg   Take 800 mg by mouth daily.   Refills:  0       OMEGA-3 FISH OIL PO        Dose:  1 g   Take 1 g by mouth daily   Refills:  0       triamcinolone 0.1 % ointment   Commonly known as:  KENALOG   Used for:  Atopic dermatitis, unspecified type        Apply sparingly to " affected area three times daily for 14 days.   Quantity:  80 g   Refills:  1       VITAMIN C PO        Dose:  500 mg   Take 500 mg by mouth 2 times daily   Refills:  0       vitamin D 2000 UNITS tablet   Used for:  Vitamin D deficiency        Dose:  2000 Units   Take 2,000 Units by mouth daily.   Quantity:  100 tablet   Refills:  3            Where to get your medicines      Some of these will need a paper prescription and others can be bought over the counter. Ask your nurse if you have questions.     Bring a paper prescription for each of these medications     HYDROcodone-acetaminophen 5-325 MG per tablet                Protect others around you: Learn how to safely use, store and throw away your medicines at www.disposemymeds.org.             Medication List: This is a list of all your medications and when to take them. Check marks below indicate your daily home schedule. Keep this list as a reference.      Medications           Morning Afternoon Evening Bedtime As Needed    aspirin 81 MG tablet   Take 1 tablet (81 mg) by mouth daily                                HYDROcodone-acetaminophen 5-325 MG per tablet   Commonly known as:  NORCO   Take 1 tablet by mouth every 4 hours as needed for pain maximum 6 tablet(s) per day                                KRILL OIL PO   Take 800 mg by mouth daily.                                OMEGA-3 FISH OIL PO   Take 1 g by mouth daily                                triamcinolone 0.1 % ointment   Commonly known as:  KENALOG   Apply sparingly to affected area three times daily for 14 days.                                VITAMIN C PO   Take 500 mg by mouth 2 times daily                                vitamin D 2000 UNITS tablet   Take 2,000 Units by mouth daily.

## 2017-07-25 ENCOUNTER — TELEPHONE (OUTPATIENT)
Dept: FAMILY MEDICINE | Facility: OTHER | Age: 62
End: 2017-07-25

## 2017-07-25 ENCOUNTER — OFFICE VISIT (OUTPATIENT)
Dept: SURGERY | Facility: OTHER | Age: 62
End: 2017-07-25
Attending: SURGERY
Payer: COMMERCIAL

## 2017-07-25 VITALS
DIASTOLIC BLOOD PRESSURE: 82 MMHG | HEART RATE: 64 BPM | HEIGHT: 61 IN | BODY MASS INDEX: 23.22 KG/M2 | WEIGHT: 123 LBS | TEMPERATURE: 97.3 F | SYSTOLIC BLOOD PRESSURE: 144 MMHG

## 2017-07-25 DIAGNOSIS — Z98.890 STATUS POST RIGHT BREAST BIOPSY: Primary | ICD-10-CM

## 2017-07-25 PROCEDURE — 99024 POSTOP FOLLOW-UP VISIT: CPT | Performed by: SURGERY

## 2017-07-25 ASSESSMENT — PAIN SCALES - GENERAL: PAINLEVEL: MILD PAIN (3)

## 2017-07-25 NOTE — NURSING NOTE
"Chief Complaint   Patient presents with     Surgical Followup     s/p excision biopsy right breast mass/excision skin tag left breast 7/17/17       Initial /82  Pulse 64  Temp 97.3  F (36.3  C)  Ht 5' 1\" (1.549 m)  Wt 123 lb (55.8 kg)  BMI 23.24 kg/m2 Estimated body mass index is 23.24 kg/(m^2) as calculated from the following:    Height as of this encounter: 5' 1\" (1.549 m).    Weight as of this encounter: 123 lb (55.8 kg).  Medication Reconciliation: complete   Racquel Back    "

## 2017-07-25 NOTE — PROGRESS NOTES
"  HPI:  Returns for first post surgical examination following excisional biopsy right breast mass for nipple discharge (7--17) without complaint.  Denies wound complication, fever, chills, nausea or vomiting.  She had a skin tag removed, left areola.  The pathology final interpretation remains pending as the slides were sent out.  Likely papilloma but Dr. Jimenez unsure regarding atypia so breast specialty pathology evaluation requested and pending.  Discussed with patient who understands.  ROS:   10 point ROS neg other than the symptoms noted above in the HPI.    Examination:  /82  Pulse 64  Temp 97.3  F (36.3  C)  Ht 5' 1\" (1.549 m)  Wt 123 lb (55.8 kg)  BMI 23.24 kg/m2    Constitutional: healthy, alert and no distress     Wound healing nicely without evidence of infection.  Good cosmetic result.  Suture removed from left site of skin tag and steri strip placed..  Impression:  Satisfactory course.  Recommendations:  The technical aspects of the procedure and operative findings were reviewed.  She was again reminding in the need to refrain from heavy lifting and strenuous activity for a total of 6 weeks following this procedure.  Final post surgical followup Wednesday, 9/6/17.    Tianna Hanley MD, FACS    7/25/2017  11:01 AM             "

## 2017-07-25 NOTE — LETTER
[facility]  [address]  [facility] [address]  Phone: [phone]    July 25, 2017        Rita Fournier  [address] [address]          To whom it may concern:    RE: Rita Fournier    The above patient is in the process of undergoing extensive medical work-up for current health conditions.  She is unable to participate in jury duty at this time.    Please contact me for questions or concerns.      Sincerely,        PENNY Quick

## 2017-07-25 NOTE — MR AVS SNAPSHOT
"              After Visit Summary   7/25/2017    Rita Fournier    MRN: [id number]           Patient Information     Date Of Birth          1955        Visit Information        Provider Department      7/25/2017 11:30 AM Tianna Hanley MD [facility]         Follow-ups after your visit        Your next 10 appointments already scheduled     Sep 06, 2017  1:30 PM CDT   (Arrive by 1:15 PM)   Post Op with Tianna Hanley MD   [facility] ([facility] )    [address] [place] [address]   [phone]              Who to contact     If you have questions or need follow up information about today's clinic visit or your schedule please contact [facility] directly at [phone].  Normal or non-critical lab and imaging results will be communicated to you by MyChart, letter or phone within 4 business days after the clinic has received the results. If you do not hear from us within 7 days, please contact the clinic through MyChart or phone. If you have a critical or abnormal lab result, we will notify you by phone as soon as possible.  Submit refill requests through [employer] or call your pharmacy and they will forward the refill request to us. Please allow 3 business days for your refill to be completed.          Additional Information About Your Visit        MyChart Information     [employer] lets you send messages to your doctor, view your test results, renew your prescriptions, schedule appointments and more. To sign up, go to [email or web address] . Click on \"Log in\" on the left side of the screen, which will take you to the Welcome page. Then click on \"Sign up Now\" on the right side of the page.     You will be asked to enter the access code listed below, as well as some personal information. Please follow the directions to create your username and password.     Your access code is: [email or web address]  Expires: 8/31/2017  3:19 PM     Your access code " "will  in 90 days. If you need help or a new code, please call your [facility] clinic or [phone].        Care EveryWhere ID     This is your Care EveryWhere ID. This could be used by other organizations to access your [facility] medical records  [id number]        Your Vitals Were     Pulse Temperature Height BMI (Body Mass Index)          64 97.3  F (36.3  C) 5' 1\" (1.549 m) 23.24 kg/m2         Blood Pressure from Last 3 Encounters:   17 144/82   17 155/86   17 134/74    Weight from Last 3 Encounters:   17 123 lb (55.8 kg)   17 124 lb (56.2 kg)   17 124 lb (56.2 kg)              Today, you had the following     No orders found for display       Primary Care Provider Office Phone # Fax #    Vannessa AndersonKASEY [phone]       [address] [address]        Equal Access to Services     MARIA ALEJANDRA MURDOCK : Hadii soraida ridley hadasho Soomaali, waaxda luqadaha, qaybta kaalmada adeegyada, mirtha andersen . So [facility] [phone].    ATENCIÓN: Si habla español, tiene a soto disposición servicios gratuitos de asistencia lingüística. Llame al [phone].    We comply with applicable federal civil rights laws and [place] laws. We do not discriminate on the basis of race, color, national origin, age, disability sex, sexual orientation or gender identity.            Thank you!     Thank you for choosing [facility]  for your care. Our goal is always to provide you with excellent care. Hearing back from our patients is one way we can continue to improve our services. Please take a few minutes to complete the written survey that you may receive in the mail after your visit with us. Thank you!             Your Updated Medication List - Protect others around you: Learn how to safely use, store and throw away your medicines at www.disposemymeds.org.          This list is accurate as of: 17 11:48 AM.  Always use your " most recent med list.                   Brand Name Dispense Instructions for use Diagnosis    aspirin 81 MG tablet     100 tablet    Take 1 tablet (81 mg) by mouth daily        HYDROcodone-acetaminophen 5-325 MG per tablet    NORCO    18 tablet    Take 1 tablet by mouth every 4 hours as needed for pain maximum 6 tablet(s) per day    Status post breast biopsy       KRILL OIL PO      Take 800 mg by mouth daily.        OMEGA-3 FISH OIL PO      Take 1 g by mouth daily        triamcinolone 0.1 % ointment    KENALOG    80 g    Apply sparingly to affected area three times daily for 14 days.    Atopic dermatitis, unspecified type       VITAMIN C PO      Take 500 mg by mouth 2 times daily        vitamin D 2000 UNITS tablet     100 tablet    Take 2,000 Units by mouth daily.    Vitamin D deficiency

## 2017-07-25 NOTE — TELEPHONE ENCOUNTER
3:16 PM    Reason for Call: Phone Call    Description: personal/please call her concerning this     Was an appointment offered for this call? No    Preferred method for responding to this message: Telephone Call    If we cannot reach you directly, may we leave a detailed response at the number you provided? Yes    Can this message wait until your PCP/provider returns, if available today? Not applicable,     Keiko Killian

## 2017-07-31 ENCOUNTER — TELEPHONE (OUTPATIENT)
Dept: SURGERY | Facility: OTHER | Age: 62
End: 2017-07-31

## 2017-07-31 NOTE — TELEPHONE ENCOUNTER
Patient called and wanted her results from her BX. Please call patient back at [phone], thanks.

## 2017-08-01 LAB — COPATH REPORT: NORMAL

## 2017-08-02 ENCOUNTER — HOSPITAL PATHOLOGY (OUTPATIENT)
Dept: OTHER | Facility: CLINIC | Age: 62
End: 2017-08-02

## 2017-08-03 DIAGNOSIS — Z53.9 ERRONEOUS ENCOUNTER--DISREGARD: Primary | ICD-10-CM

## 2017-08-06 LAB — COPATH REPORT: NORMAL

## 2017-09-06 ENCOUNTER — OFFICE VISIT (OUTPATIENT)
Dept: SURGERY | Facility: OTHER | Age: 62
End: 2017-09-06
Attending: SURGERY
Payer: COMMERCIAL

## 2017-09-06 VITALS
WEIGHT: 124 LBS | DIASTOLIC BLOOD PRESSURE: 78 MMHG | TEMPERATURE: 96.7 F | RESPIRATION RATE: 18 BRPM | HEIGHT: 61 IN | SYSTOLIC BLOOD PRESSURE: 138 MMHG | HEART RATE: 69 BPM | BODY MASS INDEX: 23.41 KG/M2 | OXYGEN SATURATION: 96 %

## 2017-09-06 DIAGNOSIS — Z87.891 HISTORY OF TOBACCO USE: ICD-10-CM

## 2017-09-06 DIAGNOSIS — Z71.6 ENCOUNTER FOR TOBACCO USE CESSATION COUNSELING: Primary | ICD-10-CM

## 2017-09-06 DIAGNOSIS — D24.1 PAPILLOMA OF RIGHT BREAST: ICD-10-CM

## 2017-09-06 PROCEDURE — G0296 VISIT TO DETERM LDCT ELIG: HCPCS | Performed by: SURGERY

## 2017-09-06 PROCEDURE — 99024 POSTOP FOLLOW-UP VISIT: CPT | Mod: 25 | Performed by: SURGERY

## 2017-09-06 ASSESSMENT — PAIN SCALES - GENERAL: PAINLEVEL: NO PAIN (0)

## 2017-09-06 NOTE — PROGRESS NOTES
"  HPI:  Returns for post surgical examination following excisional biopsy right breast 7/17/17 for bloody nipple discharge without complaint.  Final pathology confirmed  without complaint.  Denies wound complication, fever, chills, nausea or vomiting.  Final pathology confirmed:  FINAL DIAGNOSIS:   A: Right breast, subareolar, excision   - Atypical ductal hyperplasia   - Intraductal papillomas with usual ductal hyperplasia, two papillomas   the larger 4 mm in greatest dimensions     B: Right breast, subareolar, additional tissue biopsy   - Intraductal papillomas, multiple fragments, largest 2.5 mm, with   florid usual ductal hyperplasia   - Columnar cell change   - Fibrocystic change including apocrine metaplasia   - Microcalcification in both benign acini and arteries   - Changes consistent with previous core biopsy site including entrapped   epithelium     ROS:   10 point ROS neg other than the symptoms noted above in the HPI.    Examination:  /78 (BP Location: Left arm, Patient Position: Chair, Cuff Size: Adult Regular)  Pulse 69  Temp 96.7  F (35.9  C) (Tympanic)  Resp 18  Ht 1.549 m (5' 1\")  Wt 56.2 kg (124 lb)  SpO2 96%  BMI 23.43 kg/m2    Constitutional: healthy, alert and no distress  HEENT:  No obvious masses, lesions,  or abnormalities    Breast:  Incision healed.  Some retraction as expected with sub-areolar dissection.  No mass palpable.    Impression:  Satisfactory course.  Lydia Risk places her at 4% 5 year and 16% lifetime, short of MRI   Recommendations:  The technical aspects of the procedure and operative findings were reviewed.  Close followup with [facility] - she declines chemoprevention.  Tianna Hanley MD, FACS                 Lung Cancer Screening Shared Decision Making Visit     Rita Fournier is eligible for lung cancer screening on the basis of the information provided in my signed lung cancer screening order.     I have discussed with patient the risks and benefits of screening " for lung cancer with low-dose CT.     The risks include:   radiation exposure    false positives     over-diagnosis    The benefit of early detection of lung cancer is contingent upon adherence to annual screening or more frequent follow up if indicated.     Furthermore, reaping the benefits of screening requires Rita Fournier to be willing and physically able to undergo diagnostic procedures, if indicated. Although no specific guide is available for determining severity of comorbidities, it is reasonable to withhold screening in patients who have greater mortality risk from other diseases.     We did discuss that the only way to prevent lung cancer is to not smoke. Smoking cessation assistance was offered.    I did not offer risk estimation using a calculator such as this one:    ShouldIScreen

## 2017-09-06 NOTE — PATIENT INSTRUCTIONS
Thank you for allowing Dr. Hanley and our surgical team to participate in your care.  If you have a scheduling or an appointment question please contact Malina, our Health Unit Coordinator at her direct line [phone].   ALL nursing questions or concerns can be directed to your surgical nurse at: [phone].    6 month interval mammography of the right breast to document post biopsy change indicated.    Lung Cancer Screening   Frequently Asked Questions  If you are at high-risk for lung cancer, getting screened with low-dose computed tomography (LDCT) every year can help save your life. This handout offers answers to some of the most common questions about lung cancer screening. If you have other questions, please call [phone] ([phone]).     What is it?  Lung cancer screening uses special X-ray technology to create an image of your lung tissue. The exam is quick and easy and takes less than 10 seconds. We don t give you any medicine or use any needles. You can eat before and after the exam. You don t need to change your clothes as long as the clothing on your chest doesn t contain metal. But, you do need to be able to hold your breath for at least 6 seconds during the exam.    What is the goal of lung cancer screening?  The goal of lung cancer screening is to save lives. Many times, lung cancer is not found until a person starts having physical symptoms. Lung cancer screening can help detect lung cancer in the earliest stages when it may be easier to treat.    Who should be screened for lung cancer?  We suggest lung cancer screening for anyone who is at high-risk for lung cancer. You are in the high-risk group if you:      are between the ages of 55 and 79, and    have smoked at least 1 pack of cigarettes a day for 30 or more years, and    still smoke or have quit within the past 15 years.    However, if you have a new cough or shortness of breath, you should talk to your doctor before being  screened.    Some national lung health advocacy groups also recommend screening for people ages 50 to 79 who have smoked an average of 1 pack of cigarettes a day for 20 years. They must also have at least 1 other risk factor for lung cancer, not including exposure to secondhand smoke. Other risk factors are having had cancer in the past, emphysema, pulmonary fibrosis, COPD, a family history of lung cancer, or exposure to certain materials such as arsenic, asbestos, beryllium, cadmium, chromium, diesel fumes, nickel, radon or silica. Your care team can help you know if you have one of these risk factors.     Why does it matter if I have symptoms?  Certain symptoms can be a sign that you have a condition in your lungs that should be checked and treated by your doctor. These symptoms include fever, chest pain, a new or changing cough, shortness of breath that you have never felt before, coughing up blood or unexplained weight loss. Having any of these symptoms can greatly affect the results of lung cancer screening.       Should all smokers get an LDCT lung cancer screening exam?  It depends. Lung cancer screening is for a very specific group of men and women who have a history of heavy smoking over a long period of time (see  Who should be screened for lung cancer  above).  I am in the high-risk group, but have been diagnosed with cancer in the past. Is LDCT lung cancer screening right for me?  In some cases, you should not have LDCT lung screening, such as when your doctor is already following your cancer with CT scan studies. Your doctor will help you decide if LDCT lung screening is right for you.  Do I need to have a screening exam every year?  Yes. If you are in the high-risk group described earlier, you should get an LDCT lung cancer screening exam every year until you are 79, or are no longer willing or able to undergo screening and possible procedures to diagnose and treat lung cancer.  How effective is LDCT  at preventing death from lung cancer?  Studies have shown that LDCT lung cancer screening can lower the risk of death from lung cancer by 20 percent in people who are at high-risk.  What are the risks?  There are some risks and limitations of LDCT lung cancer screening. We want to make sure you understand the risks and benefits, so please let us know if you have any questions. Your doctor may want to talk with you more about these risks.    Radiation exposure: As with any exam that uses radiation, there is a very small increased risk of cancer. The amount of radiation in LDCT is small--about the same amount a person would get from a mammogram. Your doctor orders the exam when he or she feels the potential benefits outweigh the risks.    False negatives: No test is perfect, including LDCT. It is possible that you may have a medical condition, including lung cancer, that is not found during your exam. This is called a false negative result.    False positives and more testing: LDCT very often finds something in the lung that could be cancer, but in fact is not. This is called a false positive result. False positive tests often cause anxiety. To make sure these findings are not cancer, you may need to have more tests. These tests will be done only if you give us permission. Sometimes patients need a treatment that can have side effects, such as a biopsy. For more information on false positives, see  What can I expect from the results?     Findings not related to lung cancer: Your LDCT exam also takes pictures of areas of your body next to your lungs. In a very small number of cases, the CT scan will show an abnormal finding in one of these areas, such as your kidneys, adrenal glands, liver or thyroid. This finding may not be serious, but you may need more tests. Your doctor can help you decide what other tests you may need, if any.  What can I expect from the results?  About 1 out of 4 LDCT exams will find something  that may need more tests. Most of the time, these findings are lung nodules. Lung nodules are very small collections of tissue in the lung. These nodules are very common, and the vast majority--more than 97 percent--are not cancer (benign). Most are normal lymph nodes or small areas of scarring from past infections.  But, if a small lung nodule is found to be cancer, the cancer can be cured more than 90 percent of the time. To know if the nodule is cancer, we may need to get more images before your next yearly screening exam. If the nodule has suspicious features (for example, it is large, has an odd shape or grows over time), we will refer you to a specialist for further testing.  Will my doctor also get the results?  Yes. Your doctor will get a copy of your results.  Is it okay to keep smoking now that there s a cancer screening exam?  No. Tobacco is one of the strongest cancer-causing agents. It causes not only lung cancer, but other cancers and cardiovascular (heart) diseases as well. The damage caused by smoking builds over time. This means that the longer you smoke, the higher your risk of disease. While it is never too late to quit, the sooner you quit, the better.  Where can I find help to quit smoking?  The best way to prevent lung cancer is to stop smoking. If you have already quit smoking, congratulations and keep it up! For help on quitting smoking, please call [employer] at 8-232-630-AATQ ([phone]) or the American Cancer Society at 1-334.720.6854 to find local resources near you.  One-on-one health coaching:  If you d prefer to work individually with a health care provider on tobacco cessation, we offer:      Medication Therapy Management:  Our specially trained pharmacists work closely with you and your doctor to help you quit smoking.  Call [phone] or [phone] (toll free).     Can Do: Health coaching offered by [facility].  [email or web address]

## 2017-09-06 NOTE — MR AVS SNAPSHOT
After Visit Summary   9/6/2017    Rita Fournier    MRN: [id number]           Patient Information     Date Of Birth          1955        Visit Information        Provider Department      9/6/2017 1:30 PM Tianna Hanley MD [facility]        Today's Diagnoses     Encounter for tobacco use cessation counseling    -  1    Papilloma of right breast        History of tobacco use          Care Instructions    Thank you for allowing Dr. Hanley and our surgical team to participate in your care.  If you have a scheduling or an appointment question please contact Malina, our Health Unit Coordinator at her direct line [phone].   ALL nursing questions or concerns can be directed to your surgical nurse at: [phone].    6 month interval mammography of the right breast to document post biopsy change indicated.    Lung Cancer Screening   Frequently Asked Questions  If you are at high-risk for lung cancer, getting screened with low-dose computed tomography (LDCT) every year can help save your life. This handout offers answers to some of the most common questions about lung cancer screening. If you have other questions, please call [phone] ([phone]).     What is it?  Lung cancer screening uses special X-ray technology to create an image of your lung tissue. The exam is quick and easy and takes less than 10 seconds. We don t give you any medicine or use any needles. You can eat before and after the exam. You don t need to change your clothes as long as the clothing on your chest doesn t contain metal. But, you do need to be able to hold your breath for at least 6 seconds during the exam.    What is the goal of lung cancer screening?  The goal of lung cancer screening is to save lives. Many times, lung cancer is not found until a person starts having physical symptoms. Lung cancer screening can help detect lung cancer in the earliest stages when it may be easier to  treat.    Who should be screened for lung cancer?  We suggest lung cancer screening for anyone who is at high-risk for lung cancer. You are in the high-risk group if you:      are between the ages of 55 and 79, and    have smoked at least 1 pack of cigarettes a day for 30 or more years, and    still smoke or have quit within the past 15 years.    However, if you have a new cough or shortness of breath, you should talk to your doctor before being screened.    Some national lung health advocacy groups also recommend screening for people ages 50 to 79 who have smoked an average of 1 pack of cigarettes a day for 20 years. They must also have at least 1 other risk factor for lung cancer, not including exposure to secondhand smoke. Other risk factors are having had cancer in the past, emphysema, pulmonary fibrosis, COPD, a family history of lung cancer, or exposure to certain materials such as arsenic, asbestos, beryllium, cadmium, chromium, diesel fumes, nickel, radon or silica. Your care team can help you know if you have one of these risk factors.     Why does it matter if I have symptoms?  Certain symptoms can be a sign that you have a condition in your lungs that should be checked and treated by your doctor. These symptoms include fever, chest pain, a new or changing cough, shortness of breath that you have never felt before, coughing up blood or unexplained weight loss. Having any of these symptoms can greatly affect the results of lung cancer screening.       Should all smokers get an LDCT lung cancer screening exam?  It depends. Lung cancer screening is for a very specific group of men and women who have a history of heavy smoking over a long period of time (see  Who should be screened for lung cancer  above).  I am in the high-risk group, but have been diagnosed with cancer in the past. Is LDCT lung cancer screening right for me?  In some cases, you should not have LDCT lung screening, such as when your doctor is  already following your cancer with CT scan studies. Your doctor will help you decide if LDCT lung screening is right for you.  Do I need to have a screening exam every year?  Yes. If you are in the high-risk group described earlier, you should get an LDCT lung cancer screening exam every year until you are 79, or are no longer willing or able to undergo screening and possible procedures to diagnose and treat lung cancer.  How effective is LDCT at preventing death from lung cancer?  Studies have shown that LDCT lung cancer screening can lower the risk of death from lung cancer by 20 percent in people who are at high-risk.  What are the risks?  There are some risks and limitations of LDCT lung cancer screening. We want to make sure you understand the risks and benefits, so please let us know if you have any questions. Your doctor may want to talk with you more about these risks.    Radiation exposure: As with any exam that uses radiation, there is a very small increased risk of cancer. The amount of radiation in LDCT is small--about the same amount a person would get from a mammogram. Your doctor orders the exam when he or she feels the potential benefits outweigh the risks.    False negatives: No test is perfect, including LDCT. It is possible that you may have a medical condition, including lung cancer, that is not found during your exam. This is called a false negative result.    False positives and more testing: LDCT very often finds something in the lung that could be cancer, but in fact is not. This is called a false positive result. False positive tests often cause anxiety. To make sure these findings are not cancer, you may need to have more tests. These tests will be done only if you give us permission. Sometimes patients need a treatment that can have side effects, such as a biopsy. For more information on false positives, see  What can I expect from the results?     Findings not related to lung cancer: Your  LDCT exam also takes pictures of areas of your body next to your lungs. In a very small number of cases, the CT scan will show an abnormal finding in one of these areas, such as your kidneys, adrenal glands, liver or thyroid. This finding may not be serious, but you may need more tests. Your doctor can help you decide what other tests you may need, if any.  What can I expect from the results?  About 1 out of 4 LDCT exams will find something that may need more tests. Most of the time, these findings are lung nodules. Lung nodules are very small collections of tissue in the lung. These nodules are very common, and the vast majority--more than 97 percent--are not cancer (benign). Most are normal lymph nodes or small areas of scarring from past infections.  But, if a small lung nodule is found to be cancer, the cancer can be cured more than 90 percent of the time. To know if the nodule is cancer, we may need to get more images before your next yearly screening exam. If the nodule has suspicious features (for example, it is large, has an odd shape or grows over time), we will refer you to a specialist for further testing.  Will my doctor also get the results?  Yes. Your doctor will get a copy of your results.  Is it okay to keep smoking now that there s a cancer screening exam?  No. Tobacco is one of the strongest cancer-causing agents. It causes not only lung cancer, but other cancers and cardiovascular (heart) diseases as well. The damage caused by smoking builds over time. This means that the longer you smoke, the higher your risk of disease. While it is never too late to quit, the sooner you quit, the better.  Where can I find help to quit smoking?  The best way to prevent lung cancer is to stop smoking. If you have already quit smoking, congratulations and keep it up! For help on quitting smoking, please call [employer] at 2-341-292-AGCI (6519) or the American Cancer Society at 1-614.800.2265 to find local resources  "near you.  One-on-one health coaching:  If you d prefer to work individually with a health care provider on tobacco cessation, we offer:      Medication Therapy Management:  Our specially trained pharmacists work closely with you and your doctor to help you quit smoking.  Call [phone] or [phone] (toll free).     Can Do: Health coaching offered by [facility].  [email or web address]            Follow-ups after your visit        Who to contact     If you have questions or need follow up information about today's clinic visit or your schedule please contact [facility] directly at [phone].  Normal or non-critical lab and imaging results will be communicated to you by MyChart, letter or phone within 4 business days after the clinic has received the results. If you do not hear from us within 7 days, please contact the clinic through [employer] or phone. If you have a critical or abnormal lab result, we will notify you by phone as soon as possible.  Submit refill requests through [employer] or call your pharmacy and they will forward the refill request to us. Please allow 3 business days for your refill to be completed.          Additional Information About Your Visit        [employer] Information     [employer] lets you send messages to your doctor, view your test results, renew your prescriptions, schedule appointments and more. To sign up, go to [email or web address] . Click on \"Log in\" on the left side of the screen, which will take you to the Welcome page. Then click on \"Sign up Now\" on the right side of the page.     You will be asked to enter the access code listed below, as well as some personal information. Please follow the directions to create your username and password.     Your access code is: [email or web address]  Expires: 2017  2:20 PM     Your access code will  in 90 days. If you need help or a new code, please call your [facility] or [phone].        Care " "EveryWhere ID     This is your Care EveryWhere ID. This could be used by other organizations to access your [place] medical records  [id number]        Your Vitals Were     Pulse Temperature Respirations Height Pulse Oximetry BMI (Body Mass Index)    69 96.7  F (35.9  C) (Tympanic) 18 5' 1\" (1.549 m) 96% 23.43 kg/m2       Blood Pressure from Last 3 Encounters:   09/06/17 138/78   07/25/17 144/82   07/17/17 155/86    Weight from Last 3 Encounters:   09/06/17 124 lb (56.2 kg)   07/25/17 123 lb (55.8 kg)   07/17/17 124 lb (56.2 kg)              We Performed the Following     Prof fee: Shared Decisionmaking for Lung Cancer Screening          Today's Medication Changes          These changes are accurate as of: 9/6/17  2:20 PM.  If you have any questions, ask your nurse or doctor.               Stop taking these medicines if you haven't already. Please contact your care team if you have questions.     HYDROcodone-acetaminophen 5-325 MG per tablet   Commonly known as:  NORCO   Stopped by:  Tianna Hanley MD                    Primary Care Provider Office Phone # Fax #    Vannessa KerrKASEY villalobos [phone]       [address] [address]        Equal Access to Services     [facility] MATHIEU : Hadii soraida ridley hadasho Soomaali, waaxda luqadaha, qaybta kaalmada adeegyada, mirtha andersen . So [facility] [phone].    ATENCIÓN: Si habla español, tiene a soto disposición servicios gratuitos de asistencia lingüística. Henok al [phone].    We comply with applicable federal civil rights laws and [place] laws. We do not discriminate on the basis of race, color, national origin, age, disability sex, sexual orientation or gender identity.            Thank you!     Thank you for choosing [facility]  for your care. Our goal is always to provide you with excellent care. Hearing back from our patients is one way we can continue to improve our services. Please take a " few minutes to complete the written survey that you may receive in the mail after your visit with us. Thank you!             Your Updated Medication List - Protect others around you: Learn how to safely use, store and throw away your medicines at www.disposemymeds.org.          This list is accurate as of: 9/6/17  2:20 PM.  Always use your most recent med list.                   Brand Name Dispense Instructions for use Diagnosis    aspirin 81 MG tablet     100 tablet    Take 1 tablet (81 mg) by mouth daily        KRILL OIL PO      Take 800 mg by mouth daily.        OMEGA-3 FISH OIL PO      Take 1 g by mouth daily        triamcinolone 0.1 % ointment    KENALOG    80 g    Apply sparingly to affected area three times daily for 14 days.    Atopic dermatitis, unspecified type       VITAMIN C PO      Take 500 mg by mouth 2 times daily        vitamin D 2000 UNITS tablet     100 tablet    Take 2,000 Units by mouth daily.    Vitamin D deficiency

## 2017-09-06 NOTE — NURSING NOTE
"Chief Complaint   Patient presents with     Surgical Followup     final breast post op       Initial /78 (BP Location: Left arm, Patient Position: Chair, Cuff Size: Adult Regular)  Pulse 69  Temp 96.7  F (35.9  C) (Tympanic)  Resp 18  Ht 5' 1\" (1.549 m)  Wt 124 lb (56.2 kg)  SpO2 96%  BMI 23.43 kg/m2 Estimated body mass index is 23.43 kg/(m^2) as calculated from the following:    Height as of this encounter: 5' 1\" (1.549 m).    Weight as of this encounter: 124 lb (56.2 kg).  Medication Reconciliation: complete   [place]    "

## 2017-09-11 ENCOUNTER — OFFICE VISIT (OUTPATIENT)
Dept: FAMILY MEDICINE | Facility: OTHER | Age: 62
End: 2017-09-11
Attending: NURSE PRACTITIONER
Payer: COMMERCIAL

## 2017-09-11 VITALS
DIASTOLIC BLOOD PRESSURE: 78 MMHG | SYSTOLIC BLOOD PRESSURE: 138 MMHG | HEART RATE: 76 BPM | BODY MASS INDEX: 23.6 KG/M2 | WEIGHT: 125 LBS | RESPIRATION RATE: 14 BRPM | HEIGHT: 61 IN

## 2017-09-11 DIAGNOSIS — Z72.0 TOBACCO ABUSE: ICD-10-CM

## 2017-09-11 DIAGNOSIS — Z71.6 TOBACCO ABUSE COUNSELING: ICD-10-CM

## 2017-09-11 DIAGNOSIS — Z78.0 MENOPAUSE: ICD-10-CM

## 2017-09-11 DIAGNOSIS — E55.9 VITAMIN D DEFICIENCY: ICD-10-CM

## 2017-09-11 DIAGNOSIS — I10 BENIGN ESSENTIAL HYPERTENSION: Primary | ICD-10-CM

## 2017-09-11 PROBLEM — N63.10 LUMP OF BREAST, RIGHT: Status: RESOLVED | Noted: 2017-07-12 | Resolved: 2017-09-11

## 2017-09-11 LAB
ANION GAP SERPL CALCULATED.3IONS-SCNC: 7 MMOL/L (ref 3–14)
BUN SERPL-MCNC: 8 MG/DL (ref 7–30)
CALCIUM SERPL-MCNC: 9.1 MG/DL (ref 8.5–10.1)
CHLORIDE SERPL-SCNC: 106 MMOL/L (ref 94–109)
CHOLEST SERPL-MCNC: 229 MG/DL
CO2 SERPL-SCNC: 26 MMOL/L (ref 20–32)
CREAT SERPL-MCNC: 0.62 MG/DL (ref 0.52–1.04)
GFR SERPL CREATININE-BSD FRML MDRD: >90 ML/MIN/1.7M2
GLUCOSE SERPL-MCNC: 90 MG/DL (ref 70–99)
HDLC SERPL-MCNC: 58 MG/DL
LDLC SERPL CALC-MCNC: 152 MG/DL
NONHDLC SERPL-MCNC: 171 MG/DL
POTASSIUM SERPL-SCNC: 4.4 MMOL/L (ref 3.4–5.3)
SODIUM SERPL-SCNC: 139 MMOL/L (ref 133–144)
TRIGL SERPL-MCNC: 95 MG/DL
TSH SERPL DL<=0.005 MIU/L-ACNC: 1.66 MU/L (ref 0.4–4)

## 2017-09-11 PROCEDURE — 36415 COLL VENOUS BLD VENIPUNCTURE: CPT | Performed by: NURSE PRACTITIONER

## 2017-09-11 PROCEDURE — 82306 VITAMIN D 25 HYDROXY: CPT | Mod: 90 | Performed by: NURSE PRACTITIONER

## 2017-09-11 PROCEDURE — 99000 SPECIMEN HANDLING OFFICE-LAB: CPT | Performed by: NURSE PRACTITIONER

## 2017-09-11 PROCEDURE — 80061 LIPID PANEL: CPT | Performed by: NURSE PRACTITIONER

## 2017-09-11 PROCEDURE — 84443 ASSAY THYROID STIM HORMONE: CPT | Performed by: NURSE PRACTITIONER

## 2017-09-11 PROCEDURE — 99214 OFFICE O/P EST MOD 30 MIN: CPT | Mod: 24 | Performed by: NURSE PRACTITIONER

## 2017-09-11 PROCEDURE — 80048 BASIC METABOLIC PNL TOTAL CA: CPT | Performed by: NURSE PRACTITIONER

## 2017-09-11 RX ORDER — METOPROLOL SUCCINATE 25 MG/1
25 TABLET, EXTENDED RELEASE ORAL DAILY
Qty: 30 TABLET | Refills: 5 | Status: SHIPPED | OUTPATIENT
Start: 2017-09-11 | End: 2018-08-13

## 2017-09-11 ASSESSMENT — ANXIETY QUESTIONNAIRES
GAD7 TOTAL SCORE: 0
7. FEELING AFRAID AS IF SOMETHING AWFUL MIGHT HAPPEN: NOT AT ALL
6. BECOMING EASILY ANNOYED OR IRRITABLE: NOT AT ALL
IF YOU CHECKED OFF ANY PROBLEMS ON THIS QUESTIONNAIRE, HOW DIFFICULT HAVE THESE PROBLEMS MADE IT FOR YOU TO DO YOUR WORK, TAKE CARE OF THINGS AT HOME, OR GET ALONG WITH OTHER PEOPLE: NOT DIFFICULT AT ALL
3. WORRYING TOO MUCH ABOUT DIFFERENT THINGS: NOT AT ALL
5. BEING SO RESTLESS THAT IT IS HARD TO SIT STILL: NOT AT ALL
1. FEELING NERVOUS, ANXIOUS, OR ON EDGE: NOT AT ALL
2. NOT BEING ABLE TO STOP OR CONTROL WORRYING: NOT AT ALL
4. TROUBLE RELAXING: NOT AT ALL

## 2017-09-11 ASSESSMENT — PATIENT HEALTH QUESTIONNAIRE - PHQ9: SUM OF ALL RESPONSES TO PHQ QUESTIONS 1-9: 0

## 2017-09-11 NOTE — MR AVS SNAPSHOT
After Visit Summary   9/11/2017    Rita Fournier    MRN: [id number]           Patient Information     Date Of Birth          1955        Visit Information        Provider Department      9/11/2017 8:45 AM Vannessa Anderson NP [facility]        Today's Diagnoses     Benign essential hypertension    -  1    Tobacco abuse        Tobacco abuse counseling        Vitamin D deficiency        Menopause          Care Instructions        1. Tobacco abuse  - Tobacco Cessation - for Health Maintenance    2. Tobacco abuse counseling  - Tobacco Cessation - for Health Maintenance    3. Benign essential hypertension  - Toprol XL 25 mg daily  - Lipid Profile  - Basic metabolic panel  - TSH with free T4 reflex    4. Vitamin D deficiency  - Vitamin D3 5000 U daily with Caltrate OTC  - Vitamin D Deficiency    5. Menopause  - DX Hip/Pelvis/Spine      Low density CT tomorrow    BP recheck with my nurse Heaven in 2 weeks      Vannessa Anderson NP  [facility]      HOW TO QUIT SMOKING  Smoking is one of the hardest habits to break. About half of all those who have ever smoked have been able to quit, and most of those (about 70%) who still smoke want to quit. Here are some of the best ways to stop smoking.     KEEP TRYING:  It takes most smokers about 8 tries before they are finally able to fully quit. So, the more often you try and fail, the better your chance of quitting the next time! So, don't give up!    GO COLD TURKEY:  Most ex-smokers quit cold turkey. Trying to cut back gradually doesn't seem to work as well, perhaps because it continues the smoking habit. Also, it is possible to fool yourself by inhaling more while smoking fewer cigarettes. This results in the same amount of nicotine in your body!    GET SUPPORT:  Support programs can make an important difference, especially for the heavy smoker. These groups offer lectures, methods to change your behavior and peer support. Call the free  national Quitline for more information. 800-QUIT-NOW (768-160-4545). Low-cost or free programs are offered by many hospitals, local chapters of the American Lung Association (147-407-4354) and the American Cancer Society (557-185-9135). Support at home is important too. Non-smokers can help by offering praise and encouragement. If the smoker fails to quit, encourage them to try again!    OVER-THE-COUNTER MEDICINES:  For those who can't quit on their own, Nicotine Replacement Therapy (NRT) may make quitting much easier. Certain aids such as the nicotine patch, gum and lozenge are available without a prescription. However, it is best to use these under the guidance of your doctor. The skin patch provides a steady supply of nicotine to the body. Nicotine gum and lozenge gives temporary bursts of low levels of nicotine. Both methods take the edge off the craving for cigarettes. WARNING: If you feel symptoms of nicotine overdose, such as nausea, vomiting, dizziness, weakness, or fast heartbeat, stop using these and see your doctor.    PRESCRIPTION MEDICINES:  After evaluating your smoking patterns and prior attempts at quitting, your doctor may offer a prescription medicine such as bupropion (Zyban, Wellbutrin), varenicline (Chantix, Champix), a niocotine inhaler or nasal spray. Each has its unique advantage and side effects which your doctor can review with you.    HEALTH BENEFITS OF QUITTING:  The benefits of quitting start right away and keep improving the longer you go without smokin minutes: blood pressure and pulse return to normal  8 hours: oxygen levels return to normal  2 days: ability to smell and taste begins to improve as damaged nerves start to regrow  2-3 weeks: circulation and lung function improves  1-9 months: decreased cough, congestion and shortness of breath; less tired  1 year: risk of heart attack decreases by half  5 years: risk of lung cancer decreases by half; risk of stroke becomes the  same as a non-smoker  For information about how to quit smoking, visit the following links:  National Cancer [place] ,   Clearing the Air, Quit Smoking Today   - an online booklet. http://www.smokefree.gov/pubs/clearing_the_air.pdf  Smokefree.gov http://smokefree.gov/  QuitNet http://www.quitnet.com/    7331-3840 Radha Tavera, [address] [place] [address], [place] [address]. All rights reserved. This information is not intended as a substitute for professional medical care. Always follow your healthcare professional's instructions.    The Benefits of Living Smoke Free  What do you want to gain from quitting? Check off some reasons to quit.  Health Benefits  ___ Reduce my risk of lung cancer, heart disease, chronic lung disease  ___ Have fewer wrinkles and softer skin  ___ Improve my sense of taste and smell  ___ For pregnant women--reduce the risk of having a miscarriage, stillbirth, premature birth, or low-birth-weight baby  Personal Benefits  ___ Feel more in control of my life  ___ Have better-smelling hair, breath, clothes, home, and car  ___ Save time by not having to take smoke breaks, buy cigarettes, or hunt for a light  ___ Have whiter teeth  Family Benefits  ___ Reduce my children s respiratory tract infections  ___ Set a good example for my children  ___ Reduce my family s cancer risk  Financial Benefits  ___ Save hundreds of dollars each year that would be spent on cigarettes  ___ Save money on medical bills  ___ Save on life, health, and car insurance premiums    Those Dollars Add Up!  Cigarettes are expensive, and getting more expensive all the time. Do you realize how much money you are spending on cigarettes per year? What is the average amount you spend on a pack of cigarettes? What is the average number of packs that you smoke per day? Using your answers to these questions, fill in this formula to help you find out:  ($ _____ per pack) ×  ( _____ number of packs per day) × (365 days) =  $ _____  yearly cost of smoking  Besides tobacco, there are other costs, including extra cleaning bills and replacement costs for clothing and furniture; medical expenses for smoking-related illnesses; and higher health, life, and car insurance premiums.    Cigars and Pipes Count Too!  Cigars and pipes are also dangerous. So are smokeless (chewing) tobacco and snuff. All of these products contain nicotine, a highly addictive substance that has harmful effects on your body. Quitting smoking means giving up all tobacco products.      4155-0476 Radha [facility], [address] [place] [address], [place] [address]. All rights reserved. This information is not intended as a substitute for professional medical care. Always follow your healthcare professional's instructions.          Follow-ups after your visit        Your next 10 appointments already scheduled     Sep 12, 2017  2:00 PM CDT   Radiology with HI CT SCAN   HI CT Scan ([facility] )    [address] [address] [phone]              Who to contact     If you have questions or need follow up information about today's clinic visit or your schedule please contact [facility] directly at [phone].  Normal or non-critical lab and imaging results will be communicated to you by PureCarshart, letter or phone within 4 business days after the clinic has received the results. If you do not hear from us within 7 days, please contact the clinic through [employer] Team Product[employer] or phone. If you have a critical or abnormal lab result, we will notify you by phone as soon as possible.  Submit refill requests through Psonar or call your pharmacy and they will forward the refill request to us. Please allow 3 business days for your refill to be completed.          Additional Information About Your Visit        PureCarsharEchobit Information     Psonar lets you send messages to your doctor, view your test results, renew your prescriptions, schedule appointments and more. To sign  "up, go to [email or web address] . Click on \"Log in\" on the left side of the screen, which will take you to the Welcome page. Then click on \"Sign up Now\" on the right side of the page.     You will be asked to enter the access code listed below, as well as some personal information. Please follow the directions to create your username and password.     Your access code is: [email or web address]  Expires: 2017  2:20 PM     Your access code will  in 90 days. If you need help or a new code, please call your [facility] clinic or [phone].        Care EveryWhere ID     This is your [facility] EveryWhere ID. This could be used by other organizations to access your [facility] medical records  [id number]        Your Vitals Were     Pulse Respirations Height BMI (Body Mass Index)          76 14 5' 1\" (1.549 m) 23.62 kg/m2         Blood Pressure from Last 3 Encounters:   17 138/78   17 138/78   17 144/82    Weight from Last 3 Encounters:   17 125 lb (56.7 kg)   17 124 lb (56.2 kg)   17 123 lb (55.8 kg)              We Performed the Following     Basic metabolic panel     DX Hip/Pelvis/Spine     Lipid Profile     Tobacco Cessation - for Health Maintenance     TSH with free T4 reflex     Vitamin D Deficiency          Today's Medication Changes          These changes are accurate as of: 17  9:14 AM.  If you have any questions, ask your nurse or doctor.               Stop taking these medicines if you haven't already. Please contact your care team if you have questions.     KRILL OIL PO   Stopped by:  Vannessa Anderson NP           OMEGA-3 FISH OIL PO   Stopped by:  Vannessa Anderson NP           VITAMIN C PO   Stopped by:  Vannessa Anderson NP           vitamin D 2000 UNITS tablet   Stopped by:  Vannessa Anderson NP                    Primary Care Provider Office Phone # Fax #    Vannessa Anderson [phone]       [address] [address]        Equal Access to Services "     MARIA ALEJANDRA MURDOCK : Hadii aad ku jania Burns, waaxda luqadaha, qaybta kaalmada sultananadeem, mirtha bijan hayjennifer jacoborosalbalalo andersen . So [facility] [phone].    ATENCIÓN: Si habla español, tiene a soto disposición servicios gratuitos de asistencia lingüística. Llame al [phone].    We comply with applicable federal civil rights laws and [place] laws. We do not discriminate on the basis of race, color, national origin, age, disability sex, sexual orientation or gender identity.            Thank you!     Thank you for choosing [facility]  for your care. Our goal is always to provide you with excellent care. Hearing back from our patients is one way we can continue to improve our services. Please take a few minutes to complete the written survey that you may receive in the mail after your visit with us. Thank you!             Your Updated Medication List - Protect others around you: Learn how to safely use, store and throw away your medicines at www.disposemymeds.org.          This list is accurate as of: 9/11/17  9:14 AM.  Always use your most recent med list.                   Brand Name Dispense Instructions for use Diagnosis    aspirin 81 MG tablet     100 tablet    Take 1 tablet (81 mg) by mouth daily        triamcinolone 0.1 % ointment    KENALOG    80 g    Apply sparingly to affected area three times daily for 14 days.    Atopic dermatitis, unspecified type

## 2017-09-11 NOTE — PROGRESS NOTES
SUBJECTIVE:   Rita Fournier is a 61 year old female who presents to clinic today for the following health issues:        HTN    Blood pressure has been running high 189/108 at dentist and also elevated at last appt with Dr. Hanley  Problem list and histories reviewed & adjusted, as indicated.  Additional history: as documented      She is scheduled for a low density CT tomorrow, to screen for lung cancer.      BP Readings from Last 3 Encounters:   09/06/17 138/78   07/25/17 144/82   07/17/17 155/86    Wt Readings from Last 3 Encounters:   09/06/17 124 lb (56.2 kg)   07/25/17 123 lb (55.8 kg)   07/17/17 124 lb (56.2 kg)            Hypertension ROS: no TIA's, no chest pain on exertion, no dyspnea on exertion, no swelling of ankles.   Relieving Factors  - No  Aggravating factors  - No  Associated symptoms  - No  Daily Aspirin use yes  Family history of HTN and or CVD -  Yes - Mother, Father, Siblings, Son  Concomitant diagnosis  - Tobacco abuse      Problem list and histories reviewed & adjusted, as indicated.  Additional history: as documented    Patient Active Problem List   Diagnosis     Tobacco abuse     Dermatitis     Alopecia     Vitamin D deficiency     Nipple discharge in female     Past Surgical History:   Procedure Laterality Date     BIOPSY Right 06/15/2017    US guided right breast needle biopsy     BIOPSY BREAST Bilateral 7/17/2017    Procedure: BIOPSY BREAST;  EXCISIONAL BIOPSY RIGHT BREAST MASS, EXCISION LEFT BREAST SKIN TAG;  Surgeon: Tianna Hanley MD;  [facility]     CHOLECYSTECTOMY  1975    Cholelithiasis     COLONOSCOPY N/A 1/12/2015    Procedure: COLONOSCOPY;  Surgeon: Tianna Hanley MD;  Location: [facility]     facial redisection gland removal      LEFT > infected neck gland     hysterectomy./partial  1981       Social History   Substance Use Topics     Smoking status: Current Every Day Smoker     Packs/day: 1.00     Years: 25.00     Types: Cigarettes     Smokeless tobacco: Never  "Used      Comment: Tried to Quit (YES); Longest Tobacco Free (\"Cutdown\")     Alcohol use Yes      Comment: OCCASIONALLY     Family History   Problem Relation Age of Onset     CANCER Brother      Liver     Colon Cancer Brother      CANCER Brother      Pancreatic     CANCER Other      Family Hx     DIABETES Father      CANCER Father      Stomach     HEART DISEASE Father      Heart Disease     HEART DISEASE Mother      Heart Disease     CANCER Mother 73     Liver     CANCER Sister 57     Pancreatic - Cause of Death         Current Outpatient Prescriptions   Medication Sig Dispense Refill     triamcinolone (KENALOG) 0.1 % ointment Apply sparingly to affected area three times daily for 14 days. 80 g 1     aspirin 81 MG tablet Take 1 tablet (81 mg) by mouth daily 100 tablet 3     Allergies   Allergen Reactions     Penicillins Hives     Provider wants to try Ancef(7/17/17)     Recent Labs   Lab Test  07/12/17   1330  12/09/14   1346  05/08/13   1019   LDL   --    --   143*   HDL   --    --   61*   TRIG   --    --   111   ALT   --   19   --    CR  0.61   --    --    GFRESTIMATED  >90  Non  GFR Calc     --    --    GFRESTBLACK  >90   GFR Calc     --    --    POTASSIUM  4.1   --    --       BP Readings from Last 3 Encounters:   09/11/17 138/78   09/06/17 138/78   07/25/17 144/82    Wt Readings from Last 3 Encounters:   09/11/17 125 lb (56.7 kg)   09/06/17 124 lb (56.2 kg)   07/25/17 123 lb (55.8 kg)                  Labs reviewed in EPIC          Reviewed and updated as needed this visit by clinical staffTobacco  Allergies  Meds  Med Hx  Surg Hx  Fam Hx  Soc Hx      Reviewed and updated as needed this visit by Provider  Tobacco         ROS:  Constitutional, HEENT, cardiovascular, pulmonary, gi and gu systems are negative, except as otherwise noted.      OBJECTIVE:   /78 (BP Location: Right arm, Patient Position: Sitting, Cuff Size: Adult Regular)  Pulse 76  Resp 14  Ht 5' 1\" " (1.549 m)  Wt 125 lb (56.7 kg)  BMI 23.62 kg/m2  Body mass index is 23.62 kg/(m^2).     GENERAL: healthy, alert and no distress  NECK: no adenopathy, no asymmetry, masses, or scars and thyroid normal to palpation  RESP: lungs clear to auscultation - no rales, rhonchi or wheezes  CV: regular rate and rhythm, normal S1 S2, no S3 or S4, no murmur, click or rub, no peripheral edema and peripheral pulses strong  MS: no gross musculoskeletal defects noted, no edema  LYMPH: no cervical, supraclavicular, axillary, or inguinal adenopathy      ASSESSMENT/PLAN:     Hypertension; controlled   Associated with the following complications:    None   Plan:  No changes in the patient's current treatment plan          Tobacco Cessation:   reports that she has been smoking Cigarettes.  She has a 25.00 pack-year smoking history. She has never used smokeless tobacco.  Tobacco Cessation Action Plan: Self help information given to patient        1. Tobacco abuse  - Tobacco Cessation - for Health Maintenance    2. Tobacco abuse counseling  - Tobacco Cessation - for Health Maintenance    3. Benign essential hypertension  - Toprol XL 25 mg daily  - Lipid Profile  - Basic metabolic panel  - TSH with free T4 reflex    4. Vitamin D deficiency  - Vitamin D3 5000 U daily with Caltrate OTC  - Vitamin D Deficiency    5. Menopause  - DX Hip/Pelvis/Spine      Low density CT tomorrow    BP recheck with my nurse Heaven in 2 weeks      Vannessa Anderson NP  [facility]

## 2017-09-11 NOTE — PATIENT INSTRUCTIONS
1. Tobacco abuse  - Tobacco Cessation - for Health Maintenance    2. Tobacco abuse counseling  - Tobacco Cessation - for Health Maintenance    3. Benign essential hypertension  - Toprol XL 25 mg daily  - Lipid Profile  - Basic metabolic panel  - TSH with free T4 reflex    4. Vitamin D deficiency  - Vitamin D3 5000 U daily with Caltrate OTC  - Vitamin D Deficiency    5. Menopause  - DX Hip/Pelvis/Spine      Low density CT tomorrow    BP recheck with my nurse Heaven in 2 weeks      Vannessa Anderson NP  [facility] [place]      HOW TO QUIT SMOKING  Smoking is one of the hardest habits to break. About half of all those who have ever smoked have been able to quit, and most of those (about 70%) who still smoke want to quit. Here are some of the best ways to stop smoking.     KEEP TRYING:  It takes most smokers about 8 tries before they are finally able to fully quit. So, the more often you try and fail, the better your chance of quitting the next time! So, don't give up!    GO COLD TURKEY:  Most ex-smokers quit cold turkey. Trying to cut back gradually doesn't seem to work as well, perhaps because it continues the smoking habit. Also, it is possible to fool yourself by inhaling more while smoking fewer cigarettes. This results in the same amount of nicotine in your body!    GET SUPPORT:  Support programs can make an important difference, especially for the heavy smoker. These groups offer lectures, methods to change your behavior and peer support. Call the free national Quitline for more information. 800-QUIT-NOW (390-802-6146). Low-cost or free programs are offered by many hospitals, local chapters of the American Lung Association (785-073-5270) and the American Cancer Society (393-209-1203). Support at home is important too. Non-smokers can help by offering praise and encouragement. If the smoker fails to quit, encourage them to try again!    OVER-THE-COUNTER MEDICINES:  For those who can't quit on their own, Nicotine  Replacement Therapy (NRT) may make quitting much easier. Certain aids such as the nicotine patch, gum and lozenge are available without a prescription. However, it is best to use these under the guidance of your doctor. The skin patch provides a steady supply of nicotine to the body. Nicotine gum and lozenge gives temporary bursts of low levels of nicotine. Both methods take the edge off the craving for cigarettes. WARNING: If you feel symptoms of nicotine overdose, such as nausea, vomiting, dizziness, weakness, or fast heartbeat, stop using these and see your doctor.    PRESCRIPTION MEDICINES:  After evaluating your smoking patterns and prior attempts at quitting, your doctor may offer a prescription medicine such as bupropion (Zyban, Wellbutrin), varenicline (Chantix, Champix), a niocotine inhaler or nasal spray. Each has its unique advantage and side effects which your doctor can review with you.    HEALTH BENEFITS OF QUITTING:  The benefits of quitting start right away and keep improving the longer you go without smokin minutes: blood pressure and pulse return to normal  8 hours: oxygen levels return to normal  2 days: ability to smell and taste begins to improve as damaged nerves start to regrow  2-3 weeks: circulation and lung function improves  1-9 months: decreased cough, congestion and shortness of breath; less tired  1 year: risk of heart attack decreases by half  5 years: risk of lung cancer decreases by half; risk of stroke becomes the same as a non-smoker  For information about how to quit smoking, visit the following links:  National Cancer [place] ,   Clearing the Air, Quit Smoking Today   - an online booklet. http://www.smokefree.gov/pubs/clearing_the_air.pdf  Smokefree.gov http://smokefree.gov/  QuitNet http://www.quitnet.com/    3797-1667 Radha Tavera, [address] [place] [address], [place] [address]. All rights reserved. This information is not intended as a substitute for professional medical  care. Always follow your healthcare professional's instructions.    The Benefits of Living Smoke Free  What do you want to gain from quitting? Check off some reasons to quit.  Health Benefits  ___ Reduce my risk of lung cancer, heart disease, chronic lung disease  ___ Have fewer wrinkles and softer skin  ___ Improve my sense of taste and smell  ___ For pregnant women reduce the risk of having a miscarriage, stillbirth, premature birth, or low-birth-weight baby  Personal Benefits  ___ Feel more in control of my life  ___ Have better-smelling hair, breath, clothes, home, and car  ___ Save time by not having to take smoke breaks, buy cigarettes, or hunt for a light  ___ Have whiter teeth  Family Benefits  ___ Reduce my children s respiratory tract infections  ___ Set a good example for my children  ___ Reduce my family s cancer risk  Financial Benefits  ___ Save hundreds of dollars each year that would be spent on cigarettes  ___ Save money on medical bills  ___ Save on life, health, and car insurance premiums    Those Dollars Add Up!  Cigarettes are expensive, and getting more expensive all the time. Do you realize how much money you are spending on cigarettes per year? What is the average amount you spend on a pack of cigarettes? What is the average number of packs that you smoke per day? Using your answers to these questions, fill in this formula to help you find out:  ($ _____ per pack) ×  ( _____ number of packs per day) × (365 days) =  $ _____ yearly cost of smoking  Besides tobacco, there are other costs, including extra cleaning bills and replacement costs for clothing and furniture; medical expenses for smoking-related illnesses; and higher health, life, and car insurance premiums.    Cigars and Pipes Count Too!  Cigars and pipes are also dangerous. So are smokeless (chewing) tobacco and snuff. All of these products contain nicotine, a highly addictive substance that has harmful effects on your body. Quitting  smoking means giving up all tobacco products.      6446-6074 Radha Tavera, [address] [place] [address], [place] [address]. All rights reserved. This information is not intended as a substitute for professional medical care. Always follow your healthcare professional's instructions.

## 2017-09-11 NOTE — NURSING NOTE
"No chief complaint on file.      Initial /80 (BP Location: Right arm, Patient Position: Sitting, Cuff Size: Adult Regular)  Pulse 76  Resp 14  Ht 5' 1\" (1.549 m)  Wt 125 lb (56.7 kg)  BMI 23.62 kg/m2 Estimated body mass index is 23.62 kg/(m^2) as calculated from the following:    Height as of this encounter: 5' 1\" (1.549 m).    Weight as of this encounter: 125 lb (56.7 kg).  Medication Reconciliation: complete     Jennifer Vance      "

## 2017-09-11 NOTE — PROGRESS NOTES
Due to smoking history, FH of CVD, and HTN, I recommend low dose statin.  Zocor 10mg daily, enter if she is willing to try this  Fish oil 3 per day  Low fat diet    FU lipids 6 mos    [facility]  [phone]

## 2017-09-12 ENCOUNTER — HOSPITAL ENCOUNTER (OUTPATIENT)
Dept: CT IMAGING | Facility: HOSPITAL | Age: 62
Discharge: HOME | End: 2017-09-12
Attending: SURGERY | Admitting: SURGERY
Payer: COMMERCIAL

## 2017-09-12 DIAGNOSIS — Z78.0 MENOPAUSE: Primary | ICD-10-CM

## 2017-09-12 LAB — DEPRECATED CALCIDIOL+CALCIFEROL SERPL-MC: 16 UG/L (ref 20–75)

## 2017-09-12 PROCEDURE — G0297 LDCT FOR LUNG CA SCREEN: HCPCS | Mod: TC

## 2017-09-12 ASSESSMENT — ANXIETY QUESTIONNAIRES: GAD7 TOTAL SCORE: 0

## 2017-09-20 ENCOUNTER — HOSPITAL ENCOUNTER (OUTPATIENT)
Dept: GENERAL RADIOLOGY | Facility: HOSPITAL | Age: 62
Discharge: HOME | End: 2017-09-20
Attending: NURSE PRACTITIONER | Admitting: NURSE PRACTITIONER
Payer: COMMERCIAL

## 2017-09-20 PROCEDURE — 77080 DXA BONE DENSITY AXIAL: CPT | Mod: TC

## 2017-09-21 NOTE — PROGRESS NOTES
Calcium and vitamin D3 OTC  Alternately can try Fosamax weekly, or Reclast infusion    [facility]annessa [facility]ERSNew[facility]  [phone]

## 2017-10-09 LAB — COPATH REPORT: NORMAL

## 2017-11-10 ENCOUNTER — TELEPHONE (OUTPATIENT)
Dept: FAMILY MEDICINE | Facility: OTHER | Age: 62
End: 2017-11-10

## 2017-11-10 DIAGNOSIS — K04.7 DENTAL ABSCESS: Primary | ICD-10-CM

## 2017-11-10 RX ORDER — CLINDAMYCIN HCL 300 MG
300 CAPSULE ORAL 4 TIMES DAILY
Qty: 28 CAPSULE | Refills: 0 | Status: SHIPPED | OUTPATIENT
Start: 2017-11-10 | End: 2017-11-17

## 2017-11-10 NOTE — TELEPHONE ENCOUNTER
ESTEBAN azithromycin  Clindamycin sent to [facility]    To  on weekend as needed  Contact dentist with update Monday  Feel better!    Vannessa FRANCIS  [phone]

## 2017-11-10 NOTE — TELEPHONE ENCOUNTER
3:12 PM    Reason for Call: Phone Call    Description: Has an abcess unable to get into her dentist. Would like a call back    Was an appointment offered for this call? No  If yes : Appointment type              Date    Preferred method for responding to this message: Telephone Call Pushpa   What is your phone number ?    If we cannot reach you directly, may we leave a detailed response at the number you provided? Yes    Can this message wait until your PCP/provider returns, if available today?no  Keiko Killian

## 2017-11-10 NOTE — TELEPHONE ENCOUNTER
Called patient she has been trying to get a hold of her dentist all day. Per patient has been taking Azithromycin 200mg po daily for 5 days since yesterday for tooth abscess. Requests ABX for the weekend. Does have FU with dentist on 11.14.17. Updated patient that the provider is with patients at this time and may need to seek UC if needed.Verbalized understanding.Please advise.

## 2018-02-05 ENCOUNTER — RADIANT APPOINTMENT (OUTPATIENT)
Dept: MAMMOGRAPHY | Facility: OTHER | Age: 63
End: 2018-02-05
Attending: SURGERY
Payer: COMMERCIAL

## 2018-02-05 PROCEDURE — 77065 DX MAMMO INCL CAD UNI: CPT | Mod: TC

## 2018-02-05 PROCEDURE — G0279 TOMOSYNTHESIS, MAMMO: HCPCS | Mod: TC

## 2018-03-17 ENCOUNTER — HEALTH MAINTENANCE LETTER (OUTPATIENT)
Age: 63
End: 2018-03-17

## 2018-04-16 DIAGNOSIS — E55.9 VITAMIN D DEFICIENCY: Primary | ICD-10-CM

## 2018-04-18 ENCOUNTER — OFFICE VISIT (OUTPATIENT)
Dept: FAMILY MEDICINE | Facility: OTHER | Age: 63
End: 2018-04-18
Attending: NURSE PRACTITIONER
Payer: COMMERCIAL

## 2018-04-18 VITALS
HEART RATE: 76 BPM | WEIGHT: 130 LBS | RESPIRATION RATE: 14 BRPM | SYSTOLIC BLOOD PRESSURE: 140 MMHG | BODY MASS INDEX: 24.56 KG/M2 | DIASTOLIC BLOOD PRESSURE: 78 MMHG

## 2018-04-18 DIAGNOSIS — I10 BENIGN ESSENTIAL HYPERTENSION: Primary | ICD-10-CM

## 2018-04-18 DIAGNOSIS — E55.9 VITAMIN D DEFICIENCY: ICD-10-CM

## 2018-04-18 DIAGNOSIS — Z71.6 TOBACCO ABUSE COUNSELING: ICD-10-CM

## 2018-04-18 DIAGNOSIS — Z72.0 TOBACCO ABUSE: ICD-10-CM

## 2018-04-18 DIAGNOSIS — K21.9 GASTROESOPHAGEAL REFLUX DISEASE WITHOUT ESOPHAGITIS: ICD-10-CM

## 2018-04-18 DIAGNOSIS — E78.5 HYPERLIPIDEMIA LDL GOAL <100: ICD-10-CM

## 2018-04-18 PROBLEM — Z79.899 TAKING A STATIN MEDICATION: Status: ACTIVE | Noted: 2018-04-18

## 2018-04-18 PROBLEM — G43.109 MIGRAINE WITH AURA AND WITHOUT STATUS MIGRAINOSUS, NOT INTRACTABLE: Status: ACTIVE | Noted: 2018-04-18

## 2018-04-18 LAB
ALBUMIN SERPL-MCNC: 3.8 G/DL (ref 3.4–5)
ALBUMIN UR-MCNC: NEGATIVE MG/DL
ALP SERPL-CCNC: 79 U/L (ref 40–150)
ALT SERPL W P-5'-P-CCNC: 31 U/L (ref 0–50)
ANION GAP SERPL CALCULATED.3IONS-SCNC: 9 MMOL/L (ref 3–14)
APPEARANCE UR: CLEAR
AST SERPL W P-5'-P-CCNC: 22 U/L (ref 0–45)
BILIRUB SERPL-MCNC: 0.5 MG/DL (ref 0.2–1.3)
BILIRUB UR QL STRIP: NEGATIVE
BUN SERPL-MCNC: 11 MG/DL (ref 7–30)
CALCIUM SERPL-MCNC: 9 MG/DL (ref 8.5–10.1)
CHLORIDE SERPL-SCNC: 102 MMOL/L (ref 94–109)
CHOLEST SERPL-MCNC: 229 MG/DL
CO2 SERPL-SCNC: 25 MMOL/L (ref 20–32)
COLOR UR AUTO: YELLOW
CREAT SERPL-MCNC: 0.65 MG/DL (ref 0.52–1.04)
GFR SERPL CREATININE-BSD FRML MDRD: >90 ML/MIN/1.7M2
GLUCOSE SERPL-MCNC: 89 MG/DL (ref 70–99)
GLUCOSE UR STRIP-MCNC: NEGATIVE MG/DL
HDLC SERPL-MCNC: 56 MG/DL
HGB UR QL STRIP: ABNORMAL
KETONES UR STRIP-MCNC: NEGATIVE MG/DL
LDLC SERPL CALC-MCNC: 154 MG/DL
LEUKOCYTE ESTERASE UR QL STRIP: NEGATIVE
NITRATE UR QL: NEGATIVE
NONHDLC SERPL-MCNC: 173 MG/DL
PH UR STRIP: 5 PH (ref 5–7)
POTASSIUM SERPL-SCNC: 4.8 MMOL/L (ref 3.4–5.3)
PROT SERPL-MCNC: 7.9 G/DL (ref 6.8–8.8)
RBC #/AREA URNS AUTO: NORMAL /HPF
SODIUM SERPL-SCNC: 136 MMOL/L (ref 133–144)
SOURCE: ABNORMAL
SP GR UR STRIP: 1.01 (ref 1–1.03)
TRIGL SERPL-MCNC: 97 MG/DL
TSH SERPL DL<=0.005 MIU/L-ACNC: 1.51 MU/L (ref 0.4–4)
UROBILINOGEN UR STRIP-ACNC: 0.2 EU/DL (ref 0.2–1)
WBC #/AREA URNS AUTO: NORMAL /HPF

## 2018-04-18 PROCEDURE — 84443 ASSAY THYROID STIM HORMONE: CPT | Performed by: NURSE PRACTITIONER

## 2018-04-18 PROCEDURE — 81001 URINALYSIS AUTO W/SCOPE: CPT | Performed by: NURSE PRACTITIONER

## 2018-04-18 PROCEDURE — 99214 OFFICE O/P EST MOD 30 MIN: CPT | Performed by: NURSE PRACTITIONER

## 2018-04-18 PROCEDURE — 80053 COMPREHEN METABOLIC PANEL: CPT | Performed by: NURSE PRACTITIONER

## 2018-04-18 PROCEDURE — 80061 LIPID PANEL: CPT | Performed by: NURSE PRACTITIONER

## 2018-04-18 PROCEDURE — 99000 SPECIMEN HANDLING OFFICE-LAB: CPT | Performed by: NURSE PRACTITIONER

## 2018-04-18 PROCEDURE — 36415 COLL VENOUS BLD VENIPUNCTURE: CPT | Performed by: NURSE PRACTITIONER

## 2018-04-18 PROCEDURE — 82306 VITAMIN D 25 HYDROXY: CPT | Mod: 90 | Performed by: NURSE PRACTITIONER

## 2018-04-18 RX ORDER — LOSARTAN POTASSIUM 25 MG/1
25 TABLET ORAL DAILY
Qty: 30 TABLET | Refills: 1 | Status: CANCELLED | OUTPATIENT
Start: 2018-04-18

## 2018-04-18 RX ORDER — SIMVASTATIN 20 MG
20 TABLET ORAL AT BEDTIME
Qty: 90 TABLET | Refills: 1 | Status: SHIPPED | OUTPATIENT
Start: 2018-04-18 | End: 2018-09-18

## 2018-04-18 RX ORDER — OMEPRAZOLE 40 MG/1
40 CAPSULE, DELAYED RELEASE ORAL DAILY
Qty: 30 CAPSULE | Refills: 1 | Status: SHIPPED | OUTPATIENT
Start: 2018-04-18 | End: 2018-09-18

## 2018-04-18 RX ORDER — LOSARTAN POTASSIUM 50 MG/1
50 TABLET ORAL DAILY
Qty: 30 TABLET | Refills: 1 | Status: SHIPPED | OUTPATIENT
Start: 2018-04-18 | End: 2018-08-13

## 2018-04-18 ASSESSMENT — PAIN SCALES - GENERAL: PAINLEVEL: NO PAIN (0)

## 2018-04-18 ASSESSMENT — ANXIETY QUESTIONNAIRES
7. FEELING AFRAID AS IF SOMETHING AWFUL MIGHT HAPPEN: NOT AT ALL
2. NOT BEING ABLE TO STOP OR CONTROL WORRYING: NOT AT ALL
5. BEING SO RESTLESS THAT IT IS HARD TO SIT STILL: NOT AT ALL
3. WORRYING TOO MUCH ABOUT DIFFERENT THINGS: NOT AT ALL
GAD7 TOTAL SCORE: 0
6. BECOMING EASILY ANNOYED OR IRRITABLE: NOT AT ALL
IF YOU CHECKED OFF ANY PROBLEMS ON THIS QUESTIONNAIRE, HOW DIFFICULT HAVE THESE PROBLEMS MADE IT FOR YOU TO DO YOUR WORK, TAKE CARE OF THINGS AT HOME, OR GET ALONG WITH OTHER PEOPLE: NOT DIFFICULT AT ALL
4. TROUBLE RELAXING: NOT AT ALL
1. FEELING NERVOUS, ANXIOUS, OR ON EDGE: NOT AT ALL

## 2018-04-18 NOTE — MR AVS SNAPSHOT
After Visit Summary   4/18/2018    Rita Fournier    MRN: [id number]           Patient Information     Date Of Birth          1955        Visit Information        Provider Department      4/18/2018 10:30 AM Vannessa Anderson NP [facility]        Today's Diagnoses     Benign essential hypertension    -  1    Hyperlipidemia LDL goal <100        Vitamin D deficiency        Gastroesophageal reflux disease without esophagitis        Tobacco abuse        Tobacco abuse counseling          Care Instructions      ASSESSMENT/PLAN:       1. Benign essential hypertension - Goal 130/70  - Continue Toprol XL as directed  - Comprehensive metabolic panel (BMP + Alb, Alk Phos, ALT, AST, Total. Bili, TP)  - TSH with free T4 reflex  - UA reflex to Microscopic and Culture - [facility]  - losartan (COZAAR) 50 MG tablet; Take 1/2 tablet (25 mg) by mouth daily  Dispense: 30 tablet; Refill: 1  - Urine Microscopic  - Aspirin OTC  - BP check with my nurse Heaven 2 weeks    2. Hyperlipidemia LDL goal <100  - Fish oil 3 per day  - Low fat diet  - Comprehensive metabolic panel (BMP + Alb, Alk Phos, ALT, AST, Total. Bili, TP)  - Lipid Profile  - Will discuss cholesterol medication after labs arrive    3. Vitamin D deficiency  - Vitamin D3 5000 U daily  - Vitamin D level    4. Gastroesophageal reflux disease without esophagitis  - Avoid foods / fluids that irritate your stomach  - omeprazole (PRILOSEC) 40 MG capsule; Take 1 capsule (40 mg) by mouth daily Take 30-60 minutes before a meal.  Dispense: 30 capsule; Refill: 1    5. Tobacco abuse  - Tobacco Cessation - Order to Satisfy Health Maintenance    6. Tobacco abuse counseling  - See attached        Vannessa Anderson NP  [facility]        HOW TO QUIT SMOKING  Smoking is one of the hardest habits to break. About half of all those who have ever smoked have been able to quit, and most of those (about 70%) who still smoke want to quit. Here are some of the  best ways to stop smoking.     KEEP TRYING:  It takes most smokers about 8 tries before they are finally able to fully quit. So, the more often you try and fail, the better your chance of quitting the next time! So, don't give up!    GO COLD TURKEY:  Most ex-smokers quit cold turkey. Trying to cut back gradually doesn't seem to work as well, perhaps because it continues the smoking habit. Also, it is possible to fool yourself by inhaling more while smoking fewer cigarettes. This results in the same amount of nicotine in your body!    GET SUPPORT:  Support programs can make an important difference, especially for the heavy smoker. These groups offer lectures, methods to change your behavior and peer support. Call the free national Quitline for more information. 800-QUIT-NOW (254-859-8257). Low-cost or free programs are offered by many hospitals, local chapters of the American Lung Association (515-069-1683) and the American Cancer Society (160-646-7702). Support at home is important too. Non-smokers can help by offering praise and encouragement. If the smoker fails to quit, encourage them to try again!    OVER-THE-COUNTER MEDICINES:  For those who can't quit on their own, Nicotine Replacement Therapy (NRT) may make quitting much easier. Certain aids such as the nicotine patch, gum and lozenge are available without a prescription. However, it is best to use these under the guidance of your doctor. The skin patch provides a steady supply of nicotine to the body. Nicotine gum and lozenge gives temporary bursts of low levels of nicotine. Both methods take the edge off the craving for cigarettes. WARNING: If you feel symptoms of nicotine overdose, such as nausea, vomiting, dizziness, weakness, or fast heartbeat, stop using these and see your doctor.    PRESCRIPTION MEDICINES:  After evaluating your smoking patterns and prior attempts at quitting, your doctor may offer a prescription medicine such as bupropion (Zyban,  Wellbutrin), varenicline (Chantix, Champix), a niocotine inhaler or nasal spray. Each has its unique advantage and side effects which your doctor can review with you.    HEALTH BENEFITS OF QUITTING:  The benefits of quitting start right away and keep improving the longer you go without smokin minutes: blood pressure and pulse return to normal  8 hours: oxygen levels return to normal  2 days: ability to smell and taste begins to improve as damaged nerves start to regrow  2-3 weeks: circulation and lung function improves  1-9 months: decreased cough, congestion and shortness of breath; less tired  1 year: risk of heart attack decreases by half  5 years: risk of lung cancer decreases by half; risk of stroke becomes the same as a non-smoker  For information about how to quit smoking, visit the following links:  National Cancer Filley ,   Clearing the Air, Quit Smoking Today   - an online booklet. http://www.smokefree.gov/pubs/clearing_the_air.pdf  Smokefree.gov http://smokefree.gov/  QuitNet http://www.quitnet.com/    8687-6759 Radha Tavera, [address] [place] [address]. All rights reserved. This information is not intended as a substitute for professional medical care. Always follow your healthcare professional's instructions.    The Benefits of Living Smoke Free  What do you want to gain from quitting? Check off some reasons to quit.  Health Benefits  ___ Reduce my risk of lung cancer, heart disease, chronic lung disease  ___ Have fewer wrinkles and softer skin  ___ Improve my sense of taste and smell  ___ For pregnant women--reduce the risk of having a miscarriage, stillbirth, premature birth, or low-birth-weight baby  Personal Benefits  ___ Feel more in control of my life  ___ Have better-smelling hair, breath, clothes, home, and car  ___ Save time by not having to take smoke breaks, buy cigarettes, or hunt for a light  ___ Have whiter teeth  Family Benefits  ___ Reduce my children s  respiratory tract infections  ___ Set a good example for my children  ___ Reduce my family s cancer risk  Financial Benefits  ___ Save hundreds of dollars each year that would be spent on cigarettes  ___ Save money on medical bills  ___ Save on life, health, and car insurance premiums    Those Dollars Add Up!  Cigarettes are expensive, and getting more expensive all the time. Do you realize how much money you are spending on cigarettes per year? What is the average amount you spend on a pack of cigarettes? What is the average number of packs that you smoke per day? Using your answers to these questions, fill in this formula to help you find out:  ($ _____ per pack) ×  ( _____ number of packs per day) × (365 days) =  $ _____ yearly cost of smoking  Besides tobacco, there are other costs, including extra cleaning bills and replacement costs for clothing and furniture; medical expenses for smoking-related illnesses; and higher health, life, and car insurance premiums.    Cigars and Pipes Count Too!  Cigars and pipes are also dangerous. So are smokeless (chewing) tobacco and snuff. All of these products contain nicotine, a highly addictive substance that has harmful effects on your body. Quitting smoking means giving up all tobacco products.      2075-4387 [facility], [address] [place] [address], [place] [address]. All rights reserved. This information is not intended as a substitute for professional medical care. Always follow your healthcare professional's instructions.          Follow-ups after your visit        Who to contact     If you have questions or need follow up information about today's clinic visit or your schedule please contact [facility] directly at [phone].  Normal or non-critical lab and imaging results will be communicated to you by MyChart, letter or phone within 4 business days after the clinic has received the results. If you do not hear from us within 7 days, please contact the  "clinic through [employer]volve[employer] or phone. If you have a critical or abnormal lab result, we will notify you by phone as soon as possible.  Submit refill requests through [employer] or call your pharmacy and they will forward the refill request to us. Please allow 3 business days for your refill to be completed.          Additional Information About Your Visit        [employer] Information     [employer] lets you send messages to your doctor, view your test results, renew your prescriptions, schedule appointments and more. To sign up, go to [email or web address] . Click on \"Log in\" on the left side of the screen, which will take you to the Welcome page. Then click on \"Sign up Now\" on the right side of the page.     You will be asked to enter the access code listed below, as well as some personal information. Please follow the directions to create your username and password.     Your access code is: [email or web address]  Expires: 2018 12:06 PM     Your access code will  in 90 days. If you need help or a new code, please call your [facility] clinic or [phone].        Care EveryWhere ID     This is your Care EveryWhere ID. This could be used by other organizations to access your [facility] medical records  [id number]        Your Vitals Were     Pulse Respirations BMI (Body Mass Index)             76 14 24.56 kg/m2          Blood Pressure from Last 3 Encounters:   18 140/78   17 138/78   17 138/78    Weight from Last 3 Encounters:   18 130 lb (59 kg)   17 125 lb (56.7 kg)   17 124 lb (56.2 kg)              We Performed the Following     *UA reflex to Microscopic and Culture - [facility]     Comprehensive metabolic panel (BMP + Alb, Alk Phos, ALT, AST, Total. Bili, TP)     Lipid Profile     Tobacco Cessation - Order to Satisfy Health Maintenance     TSH with free T4 reflex     Urine Microscopic     Vitamin D Deficiency          Today's Medication Changes          These changes are accurate " as of 4/18/18 12:06 PM.  If you have any questions, ask your nurse or doctor.               Start taking these medicines.        Dose/Directions    losartan 50 MG tablet   Commonly known as:  COZAAR   Used for:  Benign essential hypertension   Started by:  Vannessa Anderson NP        Dose:  50 mg   Take 1 tablet (50 mg) by mouth daily   Quantity:  30 tablet   Refills:  1       omeprazole 40 MG capsule   Commonly known as:  priLOSEC   Used for:  Gastroesophageal reflux disease without esophagitis   Started by:  Vannessa Anderson NP        Dose:  40 mg   Take 1 capsule (40 mg) by mouth daily Take 30-60 minutes before a meal.   Quantity:  30 capsule   Refills:  1            Where to get your medicines      These medications were sent to [facility] [id number] [address]  AT [facility] [address] [place] [address]     Phone:  [phone]     losartan 50 MG tablet    omeprazole 40 MG capsule                Primary Care Provider Office Phone # Fax #    Vannessa Anderson [phone] [place]  [facility] [address]        Equal Access to Services     [facility] Hospital[facility] AH: Hadii aad ku hadasho Soomaali, waaxda luqadaha, qaybta kaalmada adeegyada, mirtha thakkar hayjennifer andersen . So [facility] [phone].    ATENCIÓN: Si habla español, tiene a soto disposición servicios gratuitos de asistencia lingüística. [facility] [phone].    We comply with applicable federal civil rights laws and [place] laws. We do not discriminate on the basis of race, color, national origin, age, disability, sex, sexual orientation, or gender identity.            Thank you!     Thank you for choosing [facility]  for your care. Our goal is always to provide you with excellent care. Hearing back from our patients is one way we can continue to improve our services. Please take a few minutes to complete the written survey that you may receive in the mail after  your visit with us. Thank you!             Your Updated Medication List - Protect others around you: Learn how to safely use, store and throw away your medicines at www.disposemymeds.org.          This list is accurate as of 4/18/18 12:06 PM.  Always use your most recent med list.                   Brand Name Dispense Instructions for use Diagnosis    aspirin 81 MG tablet     100 tablet    Take 1 tablet (81 mg) by mouth daily        cholecalciferol 5000 units Tabs tablet    vitamin D3    90 tablet    Take 1 tablet (5,000 Units) by mouth daily    Vitamin D deficiency       losartan 50 MG tablet    COZAAR    30 tablet    Take 1 tablet (50 mg) by mouth daily    Benign essential hypertension       metoprolol succinate 25 MG 24 hr tablet    TOPROL-XL    30 tablet    Take 1 tablet (25 mg) by mouth daily    Benign essential hypertension       omeprazole 40 MG capsule    priLOSEC    30 capsule    Take 1 capsule (40 mg) by mouth daily Take 30-60 minutes before a meal.    Gastroesophageal reflux disease without esophagitis       triamcinolone 0.1 % ointment    KENALOG    80 g    Apply sparingly to affected area three times daily for 14 days.    Atopic dermatitis, unspecified type

## 2018-04-18 NOTE — PROGRESS NOTES
Lipids again high  With smoking, HTN, and lipids, her CVD risk factor is very high (see below)  Please ask her to try low dose Zocor 20 mg daily - I will send to pharmacy  D level pending      The 10-year ASCVD risk score (Ema ORELLANA Jr, et al., 2013) is: 15.7%    Values used to calculate the score:      Age: 62 years      Sex: Female      Is Non- : No      Diabetic: No      Tobacco smoker: Yes      Systolic Blood Pressure: 140 mmHg      Is BP treated: Yes      HDL Cholesterol: 56 mg/dL      Total Cholesterol: 229 mg/dL      FU labs 3 months    Vannessa SPARKS  [phone]

## 2018-04-18 NOTE — PROGRESS NOTES
"  SUBJECTIVE:   Rita Fournier is a 62 year old female who presents to clinic today for the following health issues:      Hypertension Follow-up    Outpatient blood pressures was checked at the dentist prior to procedure, 225/193, after sitting for awhile, came down 180/? And preceded with procedure.  Today 140/80;  162/78 upon recheck    Low Salt Diet: no added salt; currently on \"low salt\" but is not sure of actual amount.       Hyperlipidemia Follow-Up      Rate your low fat/cholesterol diet?: not monitoring fat    Taking statin?  No    Other lipid medications/supplements?:  none      GERD - persists       Vitamin D deficiency, done with high dose, due for lab recheck          Amount of exercise or physical activity: None    Problems taking medications regularly: No    Medication side effects: none    Diet: regular (no restrictions)         No vision changes, has a hx of migraines with aura. Had not for years but has had 2-3 per month over last 6 months. Denies dizziness. Denies abdominal pain.       2 plus PPD smoker; 40 year smoker. Was 2.5-3 ppd but has been working to cut back over past 3 years.         Problem list and histories reviewed & adjusted, as indicated.  Additional history: as documented    Patient Active Problem List   Diagnosis     Tobacco abuse     Dermatitis     Alopecia     Vitamin D deficiency     Nipple discharge in female     Benign essential hypertension     Hyperlipidemia LDL goal <100     Gastroesophageal reflux disease without esophagitis     Past Surgical History:   Procedure Laterality Date     BIOPSY Right 06/15/2017    US guided right breast needle biopsy     BIOPSY BREAST Bilateral 7/17/2017    Procedure: BIOPSY BREAST;  EXCISIONAL BIOPSY RIGHT BREAST MASS, EXCISION LEFT BREAST SKIN TAG;  Surgeon: Tianna Hanley MD;  Location: [facility]     CHOLECYSTECTOMY  1975    Cholelithiasis     COLONOSCOPY N/A 1/12/2015    Procedure: COLONOSCOPY;  Surgeon: Tianna Hanley MD;  Location: " "[facility] OR     facial redisection gland removal      LEFT > infected neck gland     hysterectomy./partial  1981       Social History   Substance Use Topics     Smoking status: Current Every Day Smoker     Packs/day: 1.00     Years: 25.00     Types: Cigarettes     Smokeless tobacco: Never Used      Comment: Tried to Quit (YES); Longest Tobacco Free (\"Cutdown\")     Alcohol use Yes      Comment: OCCASIONALLY     Family History   Problem Relation Age of Onset     CANCER Brother      Liver     Colon Cancer Brother      CANCER Brother      Pancreatic     CANCER Other      Family Hx     DIABETES Father      CANCER Father      Stomach     HEART DISEASE Father      Heart Disease     HEART DISEASE Mother      Heart Disease     CANCER Mother 73     Liver     CANCER Sister 57     Pancreatic - Cause of Death           Reviewed and updated as needed this visit by clinical staff  Tobacco  Allergies  Meds  Med Hx  Surg Hx  Fam Hx  Soc Hx      Reviewed and updated as needed this visit by Provider  Tobacco  Meds         ROS:  Constitutional, HEENT, cardiovascular, pulmonary, GI, , musculoskeletal, neuro, skin, endocrine and psych systems are negative, except as otherwise noted.  INTEGUMENTARY/SKIN: NEGATIVE for worrisome rashes, moles or lesions  EYES: NEGATIVE for vision changes or irritation. Had cataract surgery 12 years ago.   ENT/MOUTH: NEGATIVE for ear, mouth and throat problems  RESP:NEGATIVE for significant cough or SOB  CV: NEGATIVE for chest pain, palpitations or peripheral edema  GI: NEGATIVE for nausea, abdominal pain, heartburn, or change in bowel habits  MUSCULOSKELETAL: NEGATIVE for significant arthralgias or myalgia  NEURO: NEGATIVE for weakness, dizziness or paresthesias and Hx headaches-migraine  ENDOCRINE: NEGATIVE for temperature intolerance, skin/hair changes  HEME/ALLERGY/IMMUNE: NEGATIVE for bleeding problems  PSYCHIATRIC: NEGATIVE for changes in mood or affect    OBJECTIVE:     /78  Pulse 76  Resp " 14  Wt 130 lb (59 kg)  BMI 24.56 kg/m2  Body mass index is 24.56 kg/(m^2).     GENERAL: healthy, alert and no distress  EYES: Eyes grossly normal to inspection, PERRL and conjunctivae and sclerae normal  HENT: ear canals and TM's normal, nose and mouth without ulcers or lesions  NECK: no adenopathy, no asymmetry, masses, or scars and thyroid normal to palpation  RESP: lungs clear to auscultation - no rales, rhonchi or wheezes   axillary masses or adenopathy  CV: regular rate and rhythm, normal S1 S2, no S3 or S4, no murmur, click or rub, no peripheral edema and peripheral pulses strong  ABDOMEN: soft, nontender, no hepatosplenomegaly, no masses and bowel sounds normal  MS: no gross musculoskeletal defects noted, no edema  SKIN: no suspicious lesions or rashes  NEURO: Normal strength and tone, mentation intact and speech normal  PSYCH: mentation appears normal, affect normal/bright          Visit time:   Over  30 minutes are spent with the patient.   Greater than 50 % of our visit time was spent face to face and included education and counseling regarding current conditions,  medication management, and plan of care.  We discussed the importance of medication compliance.  Visit Content:   Lab testing discussed and reviewed  Any medication adjustment has been reviewed  Health Maintenance - updated as appropriate.  Record review completed      Results for orders placed or performed in visit on 04/18/18 (from the past 48 hour(s))   Comprehensive metabolic panel (BMP + Alb, Alk Phos, ALT, AST, Total. Bili, TP)   Result Value Ref Range    Sodium 136 133 - 144 mmol/L    Potassium 4.8 3.4 - 5.3 mmol/L    Chloride 102 94 - 109 mmol/L    Carbon Dioxide 25 20 - 32 mmol/L    Anion Gap 9 3 - 14 mmol/L    Glucose 89 70 - 99 mg/dL    Urea Nitrogen 11 7 - 30 mg/dL    Creatinine 0.65 0.52 - 1.04 mg/dL    GFR Estimate >90 >60 mL/min/1.7m2    GFR Estimate If Black >90 >60 mL/min/1.7m2    Calcium 9.0 8.5 - 10.1 mg/dL    Bilirubin  Total 0.5 0.2 - 1.3 mg/dL    Albumin 3.8 3.4 - 5.0 g/dL    Protein Total 7.9 6.8 - 8.8 g/dL    Alkaline Phosphatase 79 40 - 150 U/L    ALT 31 0 - 50 U/L    AST 22 0 - 45 U/L   Lipid Profile   Result Value Ref Range    Cholesterol 229 (H) <200 mg/dL    Triglycerides 97 <150 mg/dL    HDL Cholesterol 56 >49 mg/dL    LDL Cholesterol Calculated 154 (H) <100 mg/dL    Non HDL Cholesterol 173 (H) <130 mg/dL   TSH with free T4 reflex   Result Value Ref Range    TSH 1.51 0.40 - 4.00 mU/L   *UA reflex to Microscopic and Culture - [facility]   Result Value Ref Range    Color Urine Yellow     Appearance Urine Clear     Glucose Urine Negative NEG^Negative mg/dL    Bilirubin Urine Negative NEG^Negative    Ketones Urine Negative NEG^Negative mg/dL    Specific Gravity Urine 1.015 1.003 - 1.035    Blood Urine Trace (A) NEG^Negative    pH Urine 5.0 5.0 - 7.0 pH    Protein Albumin Urine Negative NEG^Negative mg/dL    Urobilinogen Urine 0.2 0.2 - 1.0 EU/dL    Nitrite Urine Negative NEG^Negative    Leukocyte Esterase Urine Negative NEG^Negative    Source Midstream Urine    Urine Microscopic   Result Value Ref Range    WBC Urine 0 - 5 OTO5^0 - 5 /HPF    RBC Urine O - 2 OTO2^O - 2 /HPF       The 10-year ASCVD risk score (Emahernando ORELLANA Jr, et al., 2013) is: 15.7%    Values used to calculate the score:      Age: 62 years      Sex: Female      Is Non- : No      Diabetic: No      Tobacco smoker: Yes      Systolic Blood Pressure: 140 mmHg      Is BP treated: Yes      HDL Cholesterol: 56 mg/dL      Total Cholesterol: 229 mg/dL      ASSESSMENT/PLAN:       1. Benign essential hypertension - Goal 130/70  - Continue Toprol XL as directed  - Comprehensive metabolic panel (BMP + Alb, Alk Phos, ALT, AST, Total. Bili, TP)  - TSH with free T4 reflex  - UA reflex to Microscopic and Culture - [facility]  - losartan (COZAAR) 50 MG tablet; Take 1/2 tablet (25 mg) by mouth daily  Dispense: 30 tablet; Refill: 1  - Urine  Microscopic  - Aspirin OTC  - BP check with my nurse Heaven 2 weeks    2. Hyperlipidemia LDL goal <100  - Fish oil 3 per day  - Low fat diet  - Comprehensive metabolic panel (BMP + Alb, Alk Phos, ALT, AST, Total. Bili, TP)  - Lipid Profile  - Will discuss cholesterol medication after labs arrive    3. Vitamin D deficiency  - Vitamin D3 5000 U daily  - Vitamin D level    4. Gastroesophageal reflux disease without esophagitis  - Avoid foods / fluids that irritate your stomach  - omeprazole (PRILOSEC) 40 MG capsule; Take 1 capsule (40 mg) by mouth daily Take 30-60 minutes before a meal.  Dispense: 30 capsule; Refill: 1    5. Tobacco abuse  - Tobacco Cessation - Order to Satisfy Health Maintenance    6. Tobacco abuse counseling  - See attached        Vannessa Anderson NP  [facility]

## 2018-04-18 NOTE — PATIENT INSTRUCTIONS
ASSESSMENT/PLAN:       1. Benign essential hypertension - Goal 130/70  - Continue Toprol XL as directed  - Comprehensive metabolic panel (BMP + Alb, Alk Phos, ALT, AST, Total. Bili, TP)  - TSH with free T4 reflex  - UA reflex to Microscopic and Culture - [facility]  - losartan (COZAAR) 50 MG tablet; Take 1/2 tablet (25 mg) by mouth daily  Dispense: 30 tablet; Refill: 1  - Urine Microscopic  - Aspirin OTC  - BP check with my nurse Heaven 2 weeks    2. Hyperlipidemia LDL goal <100  - Fish oil 3 per day  - Low fat diet  - Comprehensive metabolic panel (BMP + Alb, Alk Phos, ALT, AST, Total. Bili, TP)  - Lipid Profile  - Will discuss cholesterol medication after labs arrive    3. Vitamin D deficiency  - Vitamin D3 5000 U daily  - Vitamin D level    4. Gastroesophageal reflux disease without esophagitis  - Avoid foods / fluids that irritate your stomach  - omeprazole (PRILOSEC) 40 MG capsule; Take 1 capsule (40 mg) by mouth daily Take 30-60 minutes before a meal.  Dispense: 30 capsule; Refill: 1    5. Tobacco abuse  - Tobacco Cessation - Order to Satisfy Health Maintenance    6. Tobacco abuse counseling  - See attached        Vannessa Anderson NP  [facility]        HOW TO QUIT SMOKING  Smoking is one of the hardest habits to break. About half of all those who have ever smoked have been able to quit, and most of those (about 70%) who still smoke want to quit. Here are some of the best ways to stop smoking.     KEEP TRYING:  It takes most smokers about 8 tries before they are finally able to fully quit. So, the more often you try and fail, the better your chance of quitting the next time! So, don't give up!    GO COLD TURKEY:  Most ex-smokers quit cold turkey. Trying to cut back gradually doesn't seem to work as well, perhaps because it continues the smoking habit. Also, it is possible to fool yourself by inhaling more while smoking fewer cigarettes. This results in the same amount of nicotine in your  body!    GET SUPPORT:  Support programs can make an important difference, especially for the heavy smoker. These groups offer lectures, methods to change your behavior and peer support. Call the free national Quitline for more information. 800-QUIT-NOW (007-046-2172). Low-cost or free programs are offered by many hospitals, local chapters of the American Lung Association (091-112-9091) and the American Cancer Society (729-843-4359). Support at home is important too. Non-smokers can help by offering praise and encouragement. If the smoker fails to quit, encourage them to try again!    OVER-THE-COUNTER MEDICINES:  For those who can't quit on their own, Nicotine Replacement Therapy (NRT) may make quitting much easier. Certain aids such as the nicotine patch, gum and lozenge are available without a prescription. However, it is best to use these under the guidance of your doctor. The skin patch provides a steady supply of nicotine to the body. Nicotine gum and lozenge gives temporary bursts of low levels of nicotine. Both methods take the edge off the craving for cigarettes. WARNING: If you feel symptoms of nicotine overdose, such as nausea, vomiting, dizziness, weakness, or fast heartbeat, stop using these and see your doctor.    PRESCRIPTION MEDICINES:  After evaluating your smoking patterns and prior attempts at quitting, your doctor may offer a prescription medicine such as bupropion (Zyban, Wellbutrin), varenicline (Chantix, Champix), a niocotine inhaler or nasal spray. Each has its unique advantage and side effects which your doctor can review with you.    HEALTH BENEFITS OF QUITTING:  The benefits of quitting start right away and keep improving the longer you go without smokin minutes: blood pressure and pulse return to normal  8 hours: oxygen levels return to normal  2 days: ability to smell and taste begins to improve as damaged nerves start to regrow  2-3 weeks: circulation and lung function improves  1-9  months: decreased cough, congestion and shortness of breath; less tired  1 year: risk of heart attack decreases by half  5 years: risk of lung cancer decreases by half; risk of stroke becomes the same as a non-smoker  For information about how to quit smoking, visit the following links:  National [employer] ,   Clearing the Air, Quit Smoking Today   - an online booklet. http://www.smokefree.gov/pubs/clearing_the_air.pdf  Smokefree.gov http://smokefree.gov/  QuitNet http://www.quitnet.com/    [id number] Radha Tavera, [address] [place] [address], [place] [address]. All rights reserved. This information is not intended as a substitute for professional medical care. Always follow your healthcare professional's instructions.    The Benefits of Living Smoke Free  What do you want to gain from quitting? Check off some reasons to quit.  Health Benefits  ___ Reduce my risk of lung cancer, heart disease, chronic lung disease  ___ Have fewer wrinkles and softer skin  ___ Improve my sense of taste and smell  ___ For pregnant women reduce the risk of having a miscarriage, stillbirth, premature birth, or low-birth-weight baby  Personal Benefits  ___ Feel more in control of my life  ___ Have better-smelling hair, breath, clothes, home, and car  ___ Save time by not having to take smoke breaks, buy cigarettes, or hunt for a light  ___ Have whiter teeth  Family Benefits  ___ Reduce my children s respiratory tract infections  ___ Set a good example for my children  ___ Reduce my family s cancer risk  Financial Benefits  ___ Save hundreds of dollars each year that would be spent on cigarettes  ___ Save money on medical bills  ___ Save on life, health, and car insurance premiums    Those Dollars Add Up!  Cigarettes are expensive, and getting more expensive all the time. Do you realize how much money you are spending on cigarettes per year? What is the average amount you spend on a pack of cigarettes? What is the average number of  packs that you smoke per day? Using your answers to these questions, fill in this formula to help you find out:  ($ _____ per pack) ×  ( _____ number of packs per day) × (365 days) =  $ _____ yearly cost of smoking  Besides tobacco, there are other costs, including extra cleaning bills and replacement costs for clothing and furniture; medical expenses for smoking-related illnesses; and higher health, life, and car insurance premiums.    Cigars and Pipes Count Too!  Cigars and pipes are also dangerous. So are smokeless (chewing) tobacco and snuff. All of these products contain nicotine, a highly addictive substance that has harmful effects on your body. Quitting smoking means giving up all tobacco products.      [id number] Radha Tavera, [address] [place] [address], [place] [address]. All rights reserved. This information is not intended as a substitute for professional medical care. Always follow your healthcare professional's instructions.

## 2018-04-18 NOTE — NURSING NOTE
"Chief Complaint   Patient presents with     Hypertension     fasting       Initial /80 (BP Location: Left arm, Patient Position: Sitting, Cuff Size: Adult Large)  Pulse 76  Resp 14  Wt 130 lb (59 kg)  BMI 24.56 kg/m2 Estimated body mass index is 24.56 kg/(m^2) as calculated from the following:    Height as of 9/11/17: 5' 1\" (1.549 m).    Weight as of this encounter: 130 lb (59 kg).  Medication Reconciliation: complete     Chief Complaint   Patient presents with     Hypertension     fasting       Jennifer Vance      "

## 2018-04-19 LAB — DEPRECATED CALCIDIOL+CALCIFEROL SERPL-MC: 70 UG/L (ref 20–75)

## 2018-04-19 ASSESSMENT — PATIENT HEALTH QUESTIONNAIRE - PHQ9: SUM OF ALL RESPONSES TO PHQ QUESTIONS 1-9: 0

## 2018-04-19 ASSESSMENT — ANXIETY QUESTIONNAIRES: GAD7 TOTAL SCORE: 0

## 2018-04-20 NOTE — PROGRESS NOTES
D is a high end of normal  Reduce OTC D to 2000 U daily    Vannessa MAYERSA.[facility]  [phone]

## 2018-09-17 NOTE — PROGRESS NOTES
SUBJECTIVE:   Rita Fournier is a 62 year old female who presents to clinic today for the following health issues:          Hyperlipidemia Follow-Up    Rate your low fat/cholesterol diet?: fair    Taking statin?  No    Other lipid medications/supplements?:  Fish oil/Omega 3, dose 1200mg without side effects      Hypertension Follow-up    Outpatient blood pressures are not being checked.    Low Salt Diet: low salt      Migraine Follow-Up    Headaches symptoms:  Stable     Frequency: Once monthly      Duration of headaches: 8 hours     Able to do normal daily activities/work with migraines: Yes    Rescue/Relief medication:ibuprofen (Advil, Motrin) and Tylenol              Effectiveness: total relief    Preventative medication: None    Neurologic complications: No new stroke-like symptoms, loss of vision or speech, numbness or weakness    In the past 4 weeks, how often have you gone to Urgent Care or the emergency room because of your headaches?  0       GERD - on Prilosec      Vitamin D deficiency - lab due        S/P total hysterectomy-  does not need a pap    Colonoscopy UTD      CT for lung cancer screening is due, ordered            Amount of exercise or physical activity: None     Problems taking medications regularly: No    Medication side effects: none    Diet: regular (no restrictions) and low salt        Problem list and histories reviewed & adjusted, as indicated.  Additional history: as documented    Patient Active Problem List   Diagnosis     Tobacco abuse     Dermatitis     Alopecia     Vitamin D deficiency     Nipple discharge in female     Benign essential hypertension     Hyperlipidemia LDL goal <100     Gastroesophageal reflux disease without esophagitis     Migraine with aura and without status migrainosus, not intractable     CNH (chondrodermatitis nodularis helicis)     Lichen sclerosus et atrophicus     Other atopic dermatitis and related conditions     Past Surgical History:   Procedure  "Laterality Date     BIOPSY Right 06/15/2017    US guided right breast needle biopsy     BIOPSY BREAST Bilateral 7/17/2017    Procedure: BIOPSY BREAST;  EXCISIONAL BIOPSY RIGHT BREAST MASS, EXCISION LEFT BREAST SKIN TAG;  Surgeon: Tianna Hanley MD;  Location: [facility] OR     C ORAL SURGERY PROCEDURE  08/23/2018    Had rods put in to prep for implants      CHOLECYSTECTOMY  1975    Cholelithiasis     COLONOSCOPY N/A 1/12/2015    Procedure: COLONOSCOPY;  Surgeon: Tianna Hanley MD;  Location: [facility] OR     facial redisection gland removal      LEFT > infected neck gland     hysterectomy./partial  1981       Social History   Substance Use Topics     Smoking status: Current Every Day Smoker     Packs/day: 2.00     Years: 30.00     Types: Cigarettes     Smokeless tobacco: Never Used      Comment: Tried to Quit (YES); Longest Tobacco Free (\"Cutdown\")     Alcohol use Yes      Comment: OCCASIONALLY     Family History   Problem Relation Age of Onset     Cancer Brother      Liver     Colon Cancer Brother      Cancer Brother      Pancreatic     Cancer Other      Family Hx     Diabetes Father      Cancer Father      Stomach     HEART DISEASE Father      Heart Disease     HEART DISEASE Mother      Heart Disease     Cancer Mother 73     Liver     Cancer Sister 57     Pancreatic - Cause of Death         Current Outpatient Prescriptions   Medication Sig Dispense Refill     aspirin 81 MG tablet Take 1 tablet (81 mg) by mouth daily 100 tablet 3     cholecalciferol (VITAMIN D3) 5000 units TABS tablet Take 1 tablet (5,000 Units) by mouth daily 90 tablet 11     losartan (COZAAR) 50 MG tablet TAKE 1 TABLET(50 MG) BY MOUTH DAILY 30 tablet 1     metoprolol succinate (TOPROL-XL) 25 MG 24 hr tablet TAKE 1 TABLET(25 MG) BY MOUTH DAILY 90 tablet 0     triamcinolone (KENALOG) 0.1 % ointment Apply sparingly to affected area three times daily for 14 days. 80 g 1     Allergies   Allergen Reactions     Bacitracin      Codeine Nausea and Vomiting     " cramping     Doxepin Unknown     face swelling     No Clinical Screening - See Comments Other (See Comments)     face swelling     Penicillins Hives     Provider wants to try Ancef(7/17/17)     Simvastatin Hives     Recent Labs   Lab Test  04/18/18   1124  09/11/17   0920   12/09/14   1346  05/08/13   1019   LDL  154*  152*   --    --   143*   HDL  56  58   --    --   61*   TRIG  97  95   --    --   111   ALT  31   --    --   19   --    CR  0.65  0.62   < >   --    --    GFRESTIMATED  >90  >90   < >   --    --    GFRESTBLACK  >90  >90   < >   --    --    POTASSIUM  4.8  4.4   < >   --    --    TSH  1.51  1.66   --    --    --     < > = values in this interval not displayed.      BP Readings from Last 3 Encounters:   09/18/18 124/78   04/18/18 140/78   09/11/17 138/78    Wt Readings from Last 3 Encounters:   09/18/18 131 lb 6.4 oz (59.6 kg)   04/18/18 130 lb (59 kg)   09/11/17 125 lb (56.7 kg)                  Labs reviewed in EPIC    Reviewed and updated as needed this visit by clinical staff  Tobacco  Allergies  Meds  Med Hx  Surg Hx  Fam Hx  Soc Hx      Reviewed and updated as needed this visit by Provider  Tobacco         ROS:  Constitutional, HEENT, cardiovascular, pulmonary, GI, , musculoskeletal, neuro, skin, endocrine and psych systems are negative, except as otherwise noted.    OBJECTIVE:     /78 (BP Location: Right arm, Patient Position: Chair, Cuff Size: Adult Regular)  Pulse 68  Temp 97.6  F (36.4  C) (Tympanic)  Wt 131 lb 6.4 oz (59.6 kg)  SpO2 97%  BMI 24.83 kg/m2  Body mass index is 24.83 kg/(m^2).     GENERAL: healthy, alert and no distress  NECK: no adenopathy, no asymmetry, masses, or scars and thyroid normal to palpation  RESP: lungs clear to auscultation - no rales, rhonchi or wheezes  CV: regular rate and rhythm, normal S1 S2, no S3 or S4, no murmur, click or rub, no peripheral edema and peripheral pulses strong  ABDOMEN: soft, nontender, no hepatosplenomegaly, no masses and  bowel sounds normal  MS: no gross musculoskeletal defects noted, no edema  SKIN: no suspicious lesions or rashes  PSYCH: mentation appears normal, affect normal/bright        ASSESSMENT/PLAN:     Hyperlipidemia; controlled   Plan:  No changes in the patient's current treatment plan    Hypertension; controlled   Associated with the following complications:    None   Plan:  No changes in the patient's current treatment plan    Migraine: controlled   Plan:  No changes in the patient's current treatment plan        Tobacco Cessation:   reports that she has been smoking Cigarettes.  She has a 60.00 pack-year smoking history. She has never used smokeless tobacco.  Tobacco Cessation Action Plan: Information offered: Patient not interested at this time  Self help information given to patient      1. Benign essential hypertension  - Continue plan of care  - Comprehensive metabolic panel (BMP + Alb, Alk Phos, ALT, AST, Total. Bili, TP); Future    2. Hyperlipidemia LDL goal <100  - Low fat diet  - Fish oil 3 per day  - Lipid Profile; Future  - Comprehensive metabolic panel (BMP + Alb, Alk Phos, ALT, AST, Total. Bili, TP); Future    3. Taking a statin medication  - Comprehensive metabolic panel (BMP + Alb, Alk Phos, ALT, AST, Total. Bili, TP); Future    4. Migraine with aura and without status migrainosus, not intractable  - Follow up as needed    5. Gastroesophageal reflux disease without esophagitis - stable  - Follow up as needed    6. Vitamin D deficiency  - Vitamin D Deficiency; Future    7. Personal history of tobacco use  - Prof fee: Shared Decisionmaking for Lung Cancer Screening  - CT Chest Lung Cancer Scrn Low Dose wo; Future  - Okay for Smoking Cessation Study (PLUTO) to Contact Patient    8. Tobacco abuse  - Tobacco Cessation - Order to Satisfy Health Maintenance    9. Tobacco abuse counseling  - See attached        Follow-up 6 months      Vannessa Anderson NP  [facility]            Lung Cancer Screening  Shared Decision Making Visit     Rita Fournier is eligible for lung cancer screening on the basis of the information provided in my signed lung cancer screening order.     I have discussed with patient the risks and benefits of screening for lung cancer with low-dose CT.     The risks include:  radiation exposure: one low dose chest CT has as much ionizing radiation as about 15 chest x-rays or 6 months of background radiation living in [place]    false positives: 96% of positive findings/nodules are NOT cancer, but some might still require additional diagnostic evaluation, including biopsy  over-diagnosis: some slow growing cancers that might never have been clinically significant will be detected and treated unnecessarily     The benefit of early detection of lung cancer is contingent upon adherence to annual screening or more frequent follow up if indicated.     Furthermore, reaping the benefits of screening requires Rita Fournier to be willing and physically able to undergo diagnostic procedures, if indicated. Although no specific guide is available for determining severity of comorbidities, it is reasonable to withhold screening in patients who have greater mortality risk from other diseases.     We did discuss that the only way to prevent lung cancer is to not smoke. Smoking cessation assistance was offered.    I did offer risk estimation using a calculator such as this one:    ShouldIScreen

## 2018-09-17 NOTE — PATIENT INSTRUCTIONS
1. Benign essential hypertension  - Continue plan of care  - Comprehensive metabolic panel (BMP + Alb, Alk Phos, ALT, AST, Total. Bili, TP); Future    2. Hyperlipidemia LDL goal <100  - Low fat diet  - Fish oil 3 per day  - Lipid Profile; Future  - Comprehensive metabolic panel (BMP + Alb, Alk Phos, ALT, AST, Total. Bili, TP); Future    3. Taking a statin medication  - Comprehensive metabolic panel (BMP + Alb, Alk Phos, ALT, AST, Total. Bili, TP); Future    4. Migraine with aura and without status migrainosus, not intractable  - Follow up as needed    5. Gastroesophageal reflux disease without esophagitis - stable  - Follow up as needed    6. Vitamin D deficiency  - Vitamin D Deficiency; Future    7. Personal history of tobacco use  - Prof fee: Shared Decisionmaking for Lung Cancer Screening  - CT Chest Lung Cancer Scrn Low Dose wo; Future  - Okay for Smoking Cessation Study (PLUTO) to Contact Patient    8. Tobacco abuse  - Tobacco Cessation - Order to Satisfy Health Maintenance    9. Tobacco abuse counseling  - See attached        Follow-up 6 months      Vannessa Anderson NP  [facility]          Lung Cancer Screening   Frequently Asked Questions  If you are at high-risk for lung cancer, getting screened with low-dose computed tomography (LDCT) every year can help save your life. This handout offers answers to some of the most common questions about lung cancer screening. If you have other questions, please call [phone] ([phone]).     What is it?  Lung cancer screening uses special X-ray technology to create an image of your lung tissue. The exam is quick and easy and takes less than 10 seconds. We don t give you any medicine or use any needles. You can eat before and after the exam. You don t need to change your clothes as long as the clothing on your chest doesn t contain metal. But, you do need to be able to hold your breath for at least 6 seconds during the exam.    What is the goal of  lung cancer screening?  The goal of lung cancer screening is to save lives. Many times, lung cancer is not found until a person starts having physical symptoms. Lung cancer screening can help detect lung cancer in the earliest stages when it may be easier to treat.    Who should be screened for lung cancer?  We suggest lung cancer screening for anyone who is at high-risk for lung cancer. You are in the high-risk group if you:      are between the ages of 55 and 79, and    have smoked at least 1 pack of cigarettes a day for 30 or more years, and    still smoke or have quit within the past 15 years.    However, if you have a new cough or shortness of breath, you should talk to your doctor before being screened.    Some national lung health advocacy groups also recommend screening for people ages 50 to 79 who have smoked an average of 1 pack of cigarettes a day for 20 years. They must also have at least 1 other risk factor for lung cancer, not including exposure to secondhand smoke. Other risk factors are having had cancer in the past, emphysema, pulmonary fibrosis, COPD, a family history of lung cancer, or exposure to certain materials such as arsenic, asbestos, beryllium, cadmium, chromium, diesel fumes, nickel, radon or silica. Your care team can help you know if you have one of these risk factors.     Why does it matter if I have symptoms?  Certain symptoms can be a sign that you have a condition in your lungs that should be checked and treated by your doctor. These symptoms include fever, chest pain, a new or changing cough, shortness of breath that you have never felt before, coughing up blood or unexplained weight loss. Having any of these symptoms can greatly affect the results of lung cancer screening.       Should all smokers get an LDCT lung cancer screening exam?  It depends. Lung cancer screening is for a very specific group of men and women who have a history of heavy smoking over a long period of time  (see  Who should be screened for lung cancer  above).  I am in the high-risk group, but have been diagnosed with cancer in the past. Is LDCT lung cancer screening right for me?  In some cases, you should not have LDCT lung screening, such as when your doctor is already following your cancer with CT scan studies. Your doctor will help you decide if LDCT lung screening is right for you.  Do I need to have a screening exam every year?  Yes. If you are in the high-risk group described earlier, you should get an LDCT lung cancer screening exam every year until you are 79, or are no longer willing or able to undergo screening and possible procedures to diagnose and treat lung cancer.  How effective is LDCT at preventing death from lung cancer?  Studies have shown that LDCT lung cancer screening can lower the risk of death from lung cancer by 20 percent in people who are at high-risk.  What are the risks?  There are some risks and limitations of LDCT lung cancer screening. We want to make sure you understand the risks and benefits, so please let us know if you have any questions. Your doctor may want to talk with you more about these risks.    Radiation exposure: As with any exam that uses radiation, there is a very small increased risk of cancer. The amount of radiation in LDCT is small--about the same amount a person would get from a mammogram. Your doctor orders the exam when he or she feels the potential benefits outweigh the risks.    False negatives: No test is perfect, including LDCT. It is possible that you may have a medical condition, including lung cancer, that is not found during your exam. This is called a false negative result.    False positives and more testing: LDCT very often finds something in the lung that could be cancer, but in fact is not. This is called a false positive result. False positive tests often cause anxiety. To make sure these findings are not cancer, you may need to have more tests.  These tests will be done only if you give us permission. Sometimes patients need a treatment that can have side effects, such as a biopsy. For more information on false positives, see  What can I expect from the results?     Findings not related to lung cancer: Your LDCT exam also takes pictures of areas of your body next to your lungs. In a very small number of cases, the CT scan will show an abnormal finding in one of these areas, such as your kidneys, adrenal glands, liver or thyroid. This finding may not be serious, but you may need more tests. Your doctor can help you decide what other tests you may need, if any.  What can I expect from the results?  About 1 out of 4 LDCT exams will find something that may need more tests. Most of the time, these findings are lung nodules. Lung nodules are very small collections of tissue in the lung. These nodules are very common, and the vast majority--more than 97 percent--are not cancer (benign). Most are normal lymph nodes or small areas of scarring from past infections.  But, if a small lung nodule is found to be cancer, the cancer can be cured more than 90 percent of the time. To know if the nodule is cancer, we may need to get more images before your next yearly screening exam. If the nodule has suspicious features (for example, it is large, has an odd shape or grows over time), we will refer you to a specialist for further testing.  Will my doctor also get the results?  Yes. Your doctor will get a copy of your results.  Is it okay to keep smoking now that there s a cancer screening exam?  No. Tobacco is one of the strongest cancer-causing agents. It causes not only lung cancer, but other cancers and cardiovascular (heart) diseases as well. The damage caused by smoking builds over time. This means that the longer you smoke, the higher your risk of disease. While it is never too late to quit, the sooner you quit, the better.  Where can I find help to quit smoking?  The  best way to prevent lung cancer is to stop smoking. If you have already quit smoking, congratulations and keep it up! For help on quitting smoking, please call [employer] at 2-965-997-TULZ ([phone]) or the American Cancer Society at 1-175.130.2541 to find local resources near you.  One-on-one health coaching:  If you d prefer to work individually with a health care provider on tobacco cessation, we offer:      Medication Therapy Management:  Our specially trained pharmacists work closely with you and your doctor to help you quit smoking.  Call [phone] or [phone] (toll free).     Can Do: Health coaching offered by [facility].  [email or web address]      HOW TO QUIT SMOKING  Smoking is one of the hardest habits to break. About half of all those who have ever smoked have been able to quit, and most of those (about 70%) who still smoke want to quit. Here are some of the best ways to stop smoking.     KEEP TRYING:  It takes most smokers about 8 tries before they are finally able to fully quit. So, the more often you try and fail, the better your chance of quitting the next time! So, don't give up!    GO COLD TURKEY:  Most ex-smokers quit cold turkey. Trying to cut back gradually doesn't seem to work as well, perhaps because it continues the smoking habit. Also, it is possible to fool yourself by inhaling more while smoking fewer cigarettes. This results in the same amount of nicotine in your body!    GET SUPPORT:  Support programs can make an important difference, especially for the heavy smoker. These groups offer lectures, methods to change your behavior and peer support. Call the free national Quitline for more information. 800-QUIT-NOW (215-925-5244). Low-cost or free programs are offered by many hospitals, local chapters of the American Lung Association (255-459-5166) and the American Cancer Society (359-599-6215). Support at home is important too. Non-smokers can help by offering praise and  encouragement. If the smoker fails to quit, encourage them to try again!    OVER-THE-COUNTER MEDICINES:  For those who can't quit on their own, Nicotine Replacement Therapy (NRT) may make quitting much easier. Certain aids such as the nicotine patch, gum and lozenge are available without a prescription. However, it is best to use these under the guidance of your doctor. The skin patch provides a steady supply of nicotine to the body. Nicotine gum and lozenge gives temporary bursts of low levels of nicotine. Both methods take the edge off the craving for cigarettes. WARNING: If you feel symptoms of nicotine overdose, such as nausea, vomiting, dizziness, weakness, or fast heartbeat, stop using these and see your doctor.    PRESCRIPTION MEDICINES:  After evaluating your smoking patterns and prior attempts at quitting, your doctor may offer a prescription medicine such as bupropion (Zyban, Wellbutrin), varenicline (Chantix, Champix), a niocotine inhaler or nasal spray. Each has its unique advantage and side effects which your doctor can review with you.    HEALTH BENEFITS OF QUITTING:  The benefits of quitting start right away and keep improving the longer you go without smokin minutes: blood pressure and pulse return to normal  8 hours: oxygen levels return to normal  2 days: ability to smell and taste begins to improve as damaged nerves start to regrow  2-3 weeks: circulation and lung function improves  1-9 months: decreased cough, congestion and shortness of breath; less tired  1 year: risk of heart attack decreases by half  5 years: risk of lung cancer decreases by half; risk of stroke becomes the same as a non-smoker  For information about how to quit smoking, visit the following links:  National [employer] [place] ,   Clearing the Air, Quit Smoking Today   - an online booklet. http://www.smokefree.gov/pubs/clearing_the_air.pdf  Smokefree.gov http://smokefree.gov/  QuitNet http://www.quitnet.com/    8955-5034  [facility] [facility], [address] [place] [address], [place] [address]. All rights reserved. This information is not intended as a substitute for professional medical care. Always follow your healthcare professional's instructions.    The Benefits of Living Smoke Free  What do you want to gain from quitting? Check off some reasons to quit.  Health Benefits  ___ Reduce my risk of lung cancer, heart disease, chronic lung disease  ___ Have fewer wrinkles and softer skin  ___ Improve my sense of taste and smell  ___ For pregnant women--reduce the risk of having a miscarriage, stillbirth, premature birth, or low-birth-weight baby  Personal Benefits  ___ Feel more in control of my life  ___ Have better-smelling hair, breath, clothes, home, and car  ___ Save time by not having to take smoke breaks, buy cigarettes, or hunt for a light  ___ Have whiter teeth  Family Benefits  ___ Reduce my children s respiratory tract infections  ___ Set a good example for my children  ___ Reduce my family s cancer risk  Financial Benefits  ___ Save hundreds of dollars each year that would be spent on cigarettes  ___ Save money on medical bills  ___ Save on life, health, and car insurance premiums    Those Dollars Add Up!  Cigarettes are expensive, and getting more expensive all the time. Do you realize how much money you are spending on cigarettes per year? What is the average amount you spend on a pack of cigarettes? What is the average number of packs that you smoke per day? Using your answers to these questions, fill in this formula to help you find out:  ($ _____ per pack) ×  ( _____ number of packs per day) × (365 days) =  $ _____ yearly cost of smoking  Besides tobacco, there are other costs, including extra cleaning bills and replacement costs for clothing and furniture; medical expenses for smoking-related illnesses; and higher health, life, and car insurance premiums.    Cigars and Pipes Count Too!  Cigars and pipes are also dangerous. So are  smokeless (chewing) tobacco and snuff. All of these products contain nicotine, a highly addictive substance that has harmful effects on your body. Quitting smoking means giving up all tobacco products.      [id number] Rad[employer]aver[employer], [address] [place] [address], [place] [address]. All rights reserved. This information is not intended as a substitute for professional medical care. Always follow your healthcare professional's instructions.

## 2018-09-18 ENCOUNTER — OFFICE VISIT (OUTPATIENT)
Dept: FAMILY MEDICINE | Facility: OTHER | Age: 63
End: 2018-09-18
Attending: NURSE PRACTITIONER
Payer: COMMERCIAL

## 2018-09-18 VITALS
OXYGEN SATURATION: 97 % | BODY MASS INDEX: 24.83 KG/M2 | HEART RATE: 68 BPM | TEMPERATURE: 97.6 F | WEIGHT: 131.4 LBS | SYSTOLIC BLOOD PRESSURE: 124 MMHG | DIASTOLIC BLOOD PRESSURE: 78 MMHG

## 2018-09-18 DIAGNOSIS — I10 BENIGN ESSENTIAL HYPERTENSION: Primary | ICD-10-CM

## 2018-09-18 DIAGNOSIS — Z72.0 TOBACCO ABUSE: ICD-10-CM

## 2018-09-18 DIAGNOSIS — K21.9 GASTROESOPHAGEAL REFLUX DISEASE WITHOUT ESOPHAGITIS: ICD-10-CM

## 2018-09-18 DIAGNOSIS — E55.9 VITAMIN D DEFICIENCY: ICD-10-CM

## 2018-09-18 DIAGNOSIS — G43.109 MIGRAINE WITH AURA AND WITHOUT STATUS MIGRAINOSUS, NOT INTRACTABLE: ICD-10-CM

## 2018-09-18 DIAGNOSIS — E78.5 HYPERLIPIDEMIA LDL GOAL <100: ICD-10-CM

## 2018-09-18 DIAGNOSIS — Z87.891 PERSONAL HISTORY OF TOBACCO USE: ICD-10-CM

## 2018-09-18 DIAGNOSIS — Z79.899 TAKING A STATIN MEDICATION: ICD-10-CM

## 2018-09-18 DIAGNOSIS — Z71.6 TOBACCO ABUSE COUNSELING: ICD-10-CM

## 2018-09-18 PROCEDURE — G0463 HOSPITAL OUTPT CLINIC VISIT: HCPCS | Mod: 25

## 2018-09-18 PROCEDURE — 99214 OFFICE O/P EST MOD 30 MIN: CPT | Performed by: NURSE PRACTITIONER

## 2018-09-18 PROCEDURE — G0463 HOSPITAL OUTPT CLINIC VISIT: HCPCS

## 2018-09-18 PROCEDURE — G0296 VISIT TO DETERM LDCT ELIG: HCPCS | Performed by: NURSE PRACTITIONER

## 2018-09-18 ASSESSMENT — ANXIETY QUESTIONNAIRES
6. BECOMING EASILY ANNOYED OR IRRITABLE: NOT AT ALL
GAD7 TOTAL SCORE: 0
3. WORRYING TOO MUCH ABOUT DIFFERENT THINGS: NOT AT ALL
1. FEELING NERVOUS, ANXIOUS, OR ON EDGE: NOT AT ALL
2. NOT BEING ABLE TO STOP OR CONTROL WORRYING: NOT AT ALL
4. TROUBLE RELAXING: NOT AT ALL
5. BEING SO RESTLESS THAT IT IS HARD TO SIT STILL: NOT AT ALL
7. FEELING AFRAID AS IF SOMETHING AWFUL MIGHT HAPPEN: NOT AT ALL

## 2018-09-18 ASSESSMENT — PAIN SCALES - GENERAL: PAINLEVEL: NO PAIN (0)

## 2018-09-18 NOTE — NURSING NOTE
"Chief Complaint   Patient presents with     Hypertension     Lipids     Gastrophageal Reflux       Initial /80 (BP Location: Right arm, Patient Position: Chair, Cuff Size: Adult Regular)  Pulse 68  Temp 97.6  F (36.4  C) (Tympanic)  Wt 131 lb 6.4 oz (59.6 kg)  SpO2 97%  BMI 24.83 kg/m2 Estimated body mass index is 24.83 kg/(m^2) as calculated from the following:    Height as of 9/11/17: 5' 1\" (1.549 m).    Weight as of this encounter: 131 lb 6.4 oz (59.6 kg).  Medication Reconciliation: cristobal Coffey    "

## 2018-09-18 NOTE — MR AVS SNAPSHOT
After Visit Summary   9/18/2018    Rita Fournier    MRN: [id number]           Patient Information     Date Of Birth          1955        Visit Information        Provider Department      9/18/2018 1:15 PM Vannessa Anderson NP [facility]        Today's Diagnoses     Benign essential hypertension    -  1    Hyperlipidemia LDL goal <100        Taking a statin medication        Migraine with aura and without status migrainosus, not intractable        Gastroesophageal reflux disease without esophagitis        Vitamin D deficiency        Personal history of tobacco use        Tobacco abuse        Tobacco abuse counseling          Care Instructions      1. Benign essential hypertension  - Continue plan of care  - Comprehensive metabolic panel (BMP + Alb, Alk Phos, ALT, AST, Total. Bili, TP); Future    2. Hyperlipidemia LDL goal <100  - Low fat diet  - Fish oil 3 per day  - Lipid Profile; Future  - Comprehensive metabolic panel (BMP + Alb, Alk Phos, ALT, AST, Total. Bili, TP); Future    3. Taking a statin medication  - Comprehensive metabolic panel (BMP + Alb, Alk Phos, ALT, AST, Total. Bili, TP); Future    4. Migraine with aura and without status migrainosus, not intractable  - Follow up as needed    5. Gastroesophageal reflux disease without esophagitis - stable  - Follow up as needed    6. Vitamin D deficiency  - Vitamin D Deficiency; Future    7. Personal history of tobacco use  - Prof fee: Shared Decisionmaking for Lung Cancer Screening  - CT Chest Lung Cancer Scrn Low Dose wo; Future  - Okay for Smoking Cessation Study (PLUTO) to Contact Patient    8. Tobacco abuse  - Tobacco Cessation - Order to Satisfy Health Maintenance    9. Tobacco abuse counseling  - See attached        Follow-up 6 months      Vannessa Anderson NP  [facility]          Lung Cancer Screening   Frequently Asked Questions  If you are at high-risk for lung cancer, getting screened with low-dose  computed tomography (LDCT) every year can help save your life. This handout offers answers to some of the most common questions about lung cancer screening. If you have other questions, please call [phone] ([phone]).     What is it?  Lung cancer screening uses special X-ray technology to create an image of your lung tissue. The exam is quick and easy and takes less than 10 seconds. We don t give you any medicine or use any needles. You can eat before and after the exam. You don t need to change your clothes as long as the clothing on your chest doesn t contain metal. But, you do need to be able to hold your breath for at least 6 seconds during the exam.    What is the goal of lung cancer screening?  The goal of lung cancer screening is to save lives. Many times, lung cancer is not found until a person starts having physical symptoms. Lung cancer screening can help detect lung cancer in the earliest stages when it may be easier to treat.    Who should be screened for lung cancer?  We suggest lung cancer screening for anyone who is at high-risk for lung cancer. You are in the high-risk group if you:      are between the ages of 55 and 79, and    have smoked at least 1 pack of cigarettes a day for 30 or more years, and    still smoke or have quit within the past 15 years.    However, if you have a new cough or shortness of breath, you should talk to your doctor before being screened.    Some national lung health advocacy groups also recommend screening for people ages 50 to 79 who have smoked an average of 1 pack of cigarettes a day for 20 years. They must also have at least 1 other risk factor for lung cancer, not including exposure to secondhand smoke. Other risk factors are having had cancer in the past, emphysema, pulmonary fibrosis, COPD, a family history of lung cancer, or exposure to certain materials such as arsenic, asbestos, beryllium, cadmium, chromium, diesel fumes, nickel, radon or silica.  Your care team can help you know if you have one of these risk factors.     Why does it matter if I have symptoms?  Certain symptoms can be a sign that you have a condition in your lungs that should be checked and treated by your doctor. These symptoms include fever, chest pain, a new or changing cough, shortness of breath that you have never felt before, coughing up blood or unexplained weight loss. Having any of these symptoms can greatly affect the results of lung cancer screening.       Should all smokers get an LDCT lung cancer screening exam?  It depends. Lung cancer screening is for a very specific group of men and women who have a history of heavy smoking over a long period of time (see  Who should be screened for lung cancer  above).  I am in the high-risk group, but have been diagnosed with cancer in the past. Is LDCT lung cancer screening right for me?  In some cases, you should not have LDCT lung screening, such as when your doctor is already following your cancer with CT scan studies. Your doctor will help you decide if LDCT lung screening is right for you.  Do I need to have a screening exam every year?  Yes. If you are in the high-risk group described earlier, you should get an LDCT lung cancer screening exam every year until you are 79, or are no longer willing or able to undergo screening and possible procedures to diagnose and treat lung cancer.  How effective is LDCT at preventing death from lung cancer?  Studies have shown that LDCT lung cancer screening can lower the risk of death from lung cancer by 20 percent in people who are at high-risk.  What are the risks?  There are some risks and limitations of LDCT lung cancer screening. We want to make sure you understand the risks and benefits, so please let us know if you have any questions. Your doctor may want to talk with you more about these risks.    Radiation exposure: As with any exam that uses radiation, there is a very small increased risk  of cancer. The amount of radiation in LDCT is small--about the same amount a person would get from a mammogram. Your doctor orders the exam when he or she feels the potential benefits outweigh the risks.    False negatives: No test is perfect, including LDCT. It is possible that you may have a medical condition, including lung cancer, that is not found during your exam. This is called a false negative result.    False positives and more testing: LDCT very often finds something in the lung that could be cancer, but in fact is not. This is called a false positive result. False positive tests often cause anxiety. To make sure these findings are not cancer, you may need to have more tests. These tests will be done only if you give us permission. Sometimes patients need a treatment that can have side effects, such as a biopsy. For more information on false positives, see  What can I expect from the results?     Findings not related to lung cancer: Your LDCT exam also takes pictures of areas of your body next to your lungs. In a very small number of cases, the CT scan will show an abnormal finding in one of these areas, such as your kidneys, adrenal glands, liver or thyroid. This finding may not be serious, but you may need more tests. Your doctor can help you decide what other tests you may need, if any.  What can I expect from the results?  About 1 out of 4 LDCT exams will find something that may need more tests. Most of the time, these findings are lung nodules. Lung nodules are very small collections of tissue in the lung. These nodules are very common, and the vast majority--more than 97 percent--are not cancer (benign). Most are normal lymph nodes or small areas of scarring from past infections.  But, if a small lung nodule is found to be cancer, the cancer can be cured more than 90 percent of the time. To know if the nodule is cancer, we may need to get more images before your next yearly screening exam. If the  nodule has suspicious features (for example, it is large, has an odd shape or grows over time), we will refer you to a specialist for further testing.  Will my doctor also get the results?  Yes. Your doctor will get a copy of your results.  Is it okay to keep smoking now that there s a cancer screening exam?  No. Tobacco is one of the strongest cancer-causing agents. It causes not only lung cancer, but other cancers and cardiovascular (heart) diseases as well. The damage caused by smoking builds over time. This means that the longer you smoke, the higher your risk of disease. While it is never too late to quit, the sooner you quit, the better.  Where can I find help to quit smoking?  The best way to prevent lung cancer is to stop smoking. If you have already quit smoking, congratulations and keep it up! For help on quitting smoking, please call [employer] at 4-782-450-NRVN ([phone]) or the American Cancer Society at 1-909.156.3434 to find local resources near you.  One-on-one health coaching:  If you d prefer to work individually with a health care provider on tobacco cessation, we offer:      Medication Therapy Management:  Our specially trained pharmacists work closely with you and your doctor to help you quit smoking.  Call [phone] or [phone] (toll free).     Can Do: Health coaching offered by [facility].  www.[email or web address]health.[email or web address]      HOW TO QUIT SMOKING  Smoking is one of the hardest habits to break. About half of all those who have ever smoked have been able to quit, and most of those (about 70%) who still smoke want to quit. Here are some of the best ways to stop smoking.     KEEP TRYING:  It takes most smokers about 8 tries before they are finally able to fully quit. So, the more often you try and fail, the better your chance of quitting the next time! So, don't give up!    GO COLD TURKEY:  Most ex-smokers quit cold turkey. Trying to cut back gradually doesn't seem to work as well,  perhaps because it continues the smoking habit. Also, it is possible to fool yourself by inhaling more while smoking fewer cigarettes. This results in the same amount of nicotine in your body!    GET SUPPORT:  Support programs can make an important difference, especially for the heavy smoker. These groups offer lectures, methods to change your behavior and peer support. Call the free national Quitline for more information. 800-QUIT-NOW (315-890-0314). Low-cost or free programs are offered by many hospitals, local chapters of the American Lung Association (328-557-0547) and the American Cancer Society (694-861-5818). Support at home is important too. Non-smokers can help by offering praise and encouragement. If the smoker fails to quit, encourage them to try again!    OVER-THE-COUNTER MEDICINES:  For those who can't quit on their own, Nicotine Replacement Therapy (NRT) may make quitting much easier. Certain aids such as the nicotine patch, gum and lozenge are available without a prescription. However, it is best to use these under the guidance of your doctor. The skin patch provides a steady supply of nicotine to the body. Nicotine gum and lozenge gives temporary bursts of low levels of nicotine. Both methods take the edge off the craving for cigarettes. WARNING: If you feel symptoms of nicotine overdose, such as nausea, vomiting, dizziness, weakness, or fast heartbeat, stop using these and see your doctor.    PRESCRIPTION MEDICINES:  After evaluating your smoking patterns and prior attempts at quitting, your doctor may offer a prescription medicine such as bupropion (Zyban, Wellbutrin), varenicline (Chantix, Champix), a niocotine inhaler or nasal spray. Each has its unique advantage and side effects which your doctor can review with you.    HEALTH BENEFITS OF QUITTING:  The benefits of quitting start right away and keep improving the longer you go without smokin minutes: blood pressure and pulse return to  normal  8 hours: oxygen levels return to normal  2 days: ability to smell and taste begins to improve as damaged nerves start to regrow  2-3 weeks: circulation and lung function improves  1-9 months: decreased cough, congestion and shortness of breath; less tired  1 year: risk of heart attack decreases by half  5 years: risk of lung cancer decreases by half; risk of stroke becomes the same as a non-smoker  For information about how to quit smoking, visit the following links:  National Cancer [place] ,   Clearing the Air, Quit Smoking Today   - an online booklet. http://www.smokefree.gov/pubs/clearing_the_air.pdf  Smokefree.gov http://smokefree.gov/  QuitNet http://www.quitnet.com/    7827-9981 Radha Tavera, [address] [place] [address], [place] [address]. All rights reserved. This information is not intended as a substitute for professional medical care. Always follow your healthcare professional's instructions.    The Benefits of Living Smoke Free  What do you want to gain from quitting? Check off some reasons to quit.  Health Benefits  ___ Reduce my risk of lung cancer, heart disease, chronic lung disease  ___ Have fewer wrinkles and softer skin  ___ Improve my sense of taste and smell  ___ For pregnant women--reduce the risk of having a miscarriage, stillbirth, premature birth, or low-birth-weight baby  Personal Benefits  ___ Feel more in control of my life  ___ Have better-smelling hair, breath, clothes, home, and car  ___ Save time by not having to take smoke breaks, buy cigarettes, or hunt for a light  ___ Have whiter teeth  Family Benefits  ___ Reduce my children s respiratory tract infections  ___ Set a good example for my children  ___ Reduce my family s cancer risk  Financial Benefits  ___ Save hundreds of dollars each year that would be spent on cigarettes  ___ Save money on medical bills  ___ Save on life, health, and car insurance premiums    Those Dollars Add Up!  Cigarettes are expensive, and getting  more expensive all the time. Do you realize how much money you are spending on cigarettes per year? What is the average amount you spend on a pack of cigarettes? What is the average number of packs that you smoke per day? Using your answers to these questions, fill in this formula to help you find out:  ($ _____ per pack) ×  ( _____ number of packs per day) × (365 days) =  $ _____ yearly cost of smoking  Besides tobacco, there are other costs, including extra cleaning bills and replacement costs for clothing and furniture; medical expenses for smoking-related illnesses; and higher health, life, and car insurance premiums.    Cigars and Pipes Count Too!  Cigars and pipes are also dangerous. So are smokeless (chewing) tobacco and snuff. All of these products contain nicotine, a highly addictive substance that has harmful effects on your body. Quitting smoking means giving up all tobacco products.      9812-5267 [place] [address]. All rights reserved. This information is not intended as a substitute for professional medical care. Always follow your healthcare professional's instructions.          Follow-ups after your visit        Future tests that were ordered for you today     Open Future Orders        Priority Expected Expires Ordered    CT Chest Lung Cancer Scrn Low Dose wo Routine  9/16/2019 9/18/2018    Lipid Profile Routine  11/18/2018 9/18/2018    Comprehensive metabolic panel (BMP + Alb, Alk Phos, ALT, AST, Total. Bili, TP) Routine  11/18/2018 9/18/2018    Vitamin D Deficiency Routine  11/18/2018 9/18/2018            Who to contact     If you have questions or need follow up information about today's clinic visit or your schedule please contact [facility] directly at [phone].  Normal or non-critical lab and imaging results will be communicated to you by MyChart, letter or phone within 4 business days after the clinic has received the results.  If you do not hear from us within 7 days, please contact the clinic through [employer] or phone. If you have a critical or abnormal lab result, we will notify you by phone as soon as possible.  Submit refill requests through [employer] or call your pharmacy and they will forward the refill request to us. Please allow 3 business days for your refill to be completed.          Additional Information About Your Visit        Care EveryWhere ID     This is your Care EveryWhere ID. This could be used by other organizations to access your [place] medical records  [id number]        Your Vitals Were     Pulse Temperature Pulse Oximetry BMI (Body Mass Index)          68 97.6  F (36.4  C) (Tympanic) 97% 24.83 kg/m2         Blood Pressure from Last 3 Encounters:   09/18/18 124/78   04/18/18 140/78   09/11/17 138/78    Weight from Last 3 Encounters:   09/18/18 131 lb 6.4 oz (59.6 kg)   04/18/18 130 lb (59 kg)   09/11/17 125 lb (56.7 kg)              We Performed the Following     Okay for Smoking Cessation Study (PLUTO) to Contact Patient     Prof fee: Shared Decisionmaking for Lung Cancer Screening     Tobacco Cessation - Order to Satisfy Health Maintenance          Today's Medication Changes          These changes are accurate as of 9/18/18  1:37 PM.  If you have any questions, ask your nurse or doctor.               Stop taking these medicines if you haven't already. Please contact your care team if you have questions.     omeprazole 40 MG capsule   Commonly known as:  priLOSEC   Stopped by:  Vannessa Anderson NP           simvastatin 20 MG tablet   Commonly known as:  ZOCOR   Stopped by:  Vannessa Anderson NP                    Primary Care Provider Office Phone # Fax #    Vannessa Anderson [phone] [address]  [facility] [address]        Equal Access to Services     MARIA ALEJANDRA MURDOCK : Paco Burns, yasmene townsend, jeff kaalmirtha madrigal. So [facility]  [phone].    ATENCIÓN: Si areli rowan, tiene a soto disposición servicios gratuitos de asistencia lingüística. Henok ewing [phone].    We comply with applicable federal civil rights laws and [place] laws. We do not discriminate on the basis of race, color, national origin, age, disability, sex, sexual orientation, or gender identity.            Thank you!     Thank you for choosing [facility]  for your care. Our goal is always to provide you with excellent care. Hearing back from our patients is one way we can continue to improve our services. Please take a few minutes to complete the written survey that you may receive in the mail after your visit with us. Thank you!             Your Updated Medication List - Protect others around you: Learn how to safely use, store and throw away your medicines at www.disposemymeds.org.          This list is accurate as of 9/18/18  1:37 PM.  Always use your most recent med list.                   Brand Name Dispense Instructions for use Diagnosis    aspirin 81 MG tablet     100 tablet    Take 1 tablet (81 mg) by mouth daily        cholecalciferol 5000 units Tabs tablet    vitamin D3    90 tablet    Take 1 tablet (5,000 Units) by mouth daily    Vitamin D deficiency       losartan 50 MG tablet    COZAAR    30 tablet    TAKE 1 TABLET(50 MG) BY MOUTH DAILY    Benign essential hypertension       metoprolol succinate 25 MG 24 hr tablet    TOPROL-XL    90 tablet    TAKE 1 TABLET(25 MG) BY MOUTH DAILY    Benign essential hypertension       triamcinolone 0.1 % ointment    KENALOG    80 g    Apply sparingly to affected area three times daily for 14 days.    Atopic dermatitis, unspecified type

## 2018-09-19 DIAGNOSIS — I10 BENIGN ESSENTIAL HYPERTENSION: ICD-10-CM

## 2018-09-19 DIAGNOSIS — Z79.899 TAKING A STATIN MEDICATION: ICD-10-CM

## 2018-09-19 DIAGNOSIS — E55.9 VITAMIN D DEFICIENCY: ICD-10-CM

## 2018-09-19 DIAGNOSIS — E78.5 HYPERLIPIDEMIA LDL GOAL <100: ICD-10-CM

## 2018-09-19 LAB
ALBUMIN SERPL-MCNC: 3.9 G/DL (ref 3.4–5)
ALP SERPL-CCNC: 74 U/L (ref 40–150)
ALT SERPL W P-5'-P-CCNC: 26 U/L (ref 0–50)
ANION GAP SERPL CALCULATED.3IONS-SCNC: 7 MMOL/L (ref 3–14)
AST SERPL W P-5'-P-CCNC: 16 U/L (ref 0–45)
BILIRUB SERPL-MCNC: 0.3 MG/DL (ref 0.2–1.3)
BUN SERPL-MCNC: 11 MG/DL (ref 7–30)
CALCIUM SERPL-MCNC: 9.6 MG/DL (ref 8.5–10.1)
CHLORIDE SERPL-SCNC: 105 MMOL/L (ref 94–109)
CHOLEST SERPL-MCNC: 235 MG/DL
CO2 SERPL-SCNC: 26 MMOL/L (ref 20–32)
CREAT SERPL-MCNC: 0.6 MG/DL (ref 0.52–1.04)
GFR SERPL CREATININE-BSD FRML MDRD: >90 ML/MIN/1.7M2
GLUCOSE SERPL-MCNC: 89 MG/DL (ref 70–99)
HDLC SERPL-MCNC: 47 MG/DL
LDLC SERPL CALC-MCNC: 164 MG/DL
NONHDLC SERPL-MCNC: 188 MG/DL
POTASSIUM SERPL-SCNC: 4.2 MMOL/L (ref 3.4–5.3)
PROT SERPL-MCNC: 7.9 G/DL (ref 6.8–8.8)
SODIUM SERPL-SCNC: 138 MMOL/L (ref 133–144)
TRIGL SERPL-MCNC: 121 MG/DL

## 2018-09-19 PROCEDURE — 99000 SPECIMEN HANDLING OFFICE-LAB: CPT | Performed by: NURSE PRACTITIONER

## 2018-09-19 PROCEDURE — 80061 LIPID PANEL: CPT | Mod: ZL | Performed by: NURSE PRACTITIONER

## 2018-09-19 PROCEDURE — 36415 COLL VENOUS BLD VENIPUNCTURE: CPT | Mod: ZL | Performed by: NURSE PRACTITIONER

## 2018-09-19 PROCEDURE — 80053 COMPREHEN METABOLIC PANEL: CPT | Mod: ZL | Performed by: NURSE PRACTITIONER

## 2018-09-19 PROCEDURE — 82306 VITAMIN D 25 HYDROXY: CPT | Mod: ZL | Performed by: NURSE PRACTITIONER

## 2018-09-20 LAB — DEPRECATED CALCIDIOL+CALCIFEROL SERPL-MC: 40 UG/L (ref 20–75)

## 2018-09-20 ASSESSMENT — ANXIETY QUESTIONNAIRES: GAD7 TOTAL SCORE: 0

## 2018-09-20 ASSESSMENT — PATIENT HEALTH QUESTIONNAIRE - PHQ9: SUM OF ALL RESPONSES TO PHQ QUESTIONS 1-9: 0

## 2018-09-21 NOTE — PROGRESS NOTES
Lipids are higher, HDL is lower - should consider low dose statin  Recommend Zocor 20mg daily  Pls let me know      The 10-year ASCVD risk score ([place] ORELLANA Jr, et al., 2013) is: 13.8%    Values used to calculate the score:      Age: 62 years      Sex: Female      Is Non- : No      Diabetic: No      Tobacco smoker: Yes      Systolic Blood Pressure: 124 mmHg      Is BP treated: Yes      HDL Cholesterol: 47 mg/dL      Total Cholesterol: 235 mg/dL    Vannessa [facility]ERSE[facility]  [phone]

## 2018-10-03 ENCOUNTER — HOSPITAL ENCOUNTER (OUTPATIENT)
Dept: CT IMAGING | Facility: HOSPITAL | Age: 63
Discharge: HOME | End: 2018-10-03
Attending: NURSE PRACTITIONER | Admitting: NURSE PRACTITIONER
Payer: MEDICARE

## 2018-10-03 DIAGNOSIS — R91.8 PULMONARY NODULES: Primary | ICD-10-CM

## 2018-10-03 DIAGNOSIS — Z87.891 PERSONAL HISTORY OF TOBACCO USE: ICD-10-CM

## 2018-10-03 PROCEDURE — 71250 CT THORAX DX C-: CPT | Mod: TC

## 2018-10-03 NOTE — PROGRESS NOTES
Low dose chest CT completed - see below.  Kidney finding noted, don't mention mass, it could be a number of things - common finding, typically hemangiomas, no worries.  Pulmonary nodules - has had previously, slight change - I need to ask Dr Dash if this needs a different type of work up and or what the follow up is.    [facility]  [phone]

## 2018-10-04 ENCOUNTER — TELEPHONE (OUTPATIENT)
Dept: CT IMAGING | Facility: HOSPITAL | Age: 63
End: 2018-10-04

## 2018-10-09 ENCOUNTER — TELEPHONE (OUTPATIENT)
Dept: CT IMAGING | Facility: HOSPITAL | Age: 63
End: 2018-10-09

## 2018-10-09 DIAGNOSIS — N28.89 LEFT RENAL MASS: Primary | ICD-10-CM

## 2018-10-09 NOTE — TELEPHONE ENCOUNTER
Pt had a abdominal CT ordered and never received a call to schedule, you need to re-write order as abd and pelvis, and dx previous DX was chest nodules, doesn't work??

## 2018-10-09 NOTE — TELEPHONE ENCOUNTER
An incidental kidney mass showed up, likely not worrisome.  Radiologist recommended CT to be sure.    Vannessa Anderson [facility]  [phone]

## 2018-10-15 DIAGNOSIS — I10 BENIGN ESSENTIAL HYPERTENSION: ICD-10-CM

## 2018-10-16 RX ORDER — LOSARTAN POTASSIUM 50 MG/1
TABLET ORAL
Qty: 90 TABLET | Refills: 1 | Status: SHIPPED | OUTPATIENT
Start: 2018-10-16 | End: 2019-10-04

## 2018-10-17 ENCOUNTER — HOSPITAL ENCOUNTER (OUTPATIENT)
Dept: CT IMAGING | Facility: HOSPITAL | Age: 63
Discharge: HOME | End: 2018-10-17
Attending: NURSE PRACTITIONER | Admitting: NURSE PRACTITIONER
Payer: MEDICARE

## 2018-10-17 DIAGNOSIS — N28.89 LEFT RENAL MASS: ICD-10-CM

## 2018-10-17 PROCEDURE — 25500064 ZZH RX 255 OP 636: Performed by: RADIOLOGY

## 2018-10-17 PROCEDURE — 74178 CT ABD&PLV WO CNTR FLWD CNTR: CPT | Mod: TC

## 2018-10-17 RX ORDER — IOPAMIDOL 612 MG/ML
100 INJECTION, SOLUTION INTRAVASCULAR ONCE
Status: COMPLETED | OUTPATIENT
Start: 2018-10-17 | End: 2018-10-17

## 2018-10-17 RX ADMIN — IOPAMIDOL 100 ML: 612 INJECTION, SOLUTION INTRAVENOUS at 11:29

## 2018-10-17 NOTE — PROGRESS NOTES
I called her to discuss test.  You do not need to call her.    Please print this test result for me - I want to call Dr Smith because I have a question.  I will call him on Friday.  Vannessa FRANCIS  [phone]

## 2018-12-12 DIAGNOSIS — I10 BENIGN ESSENTIAL HYPERTENSION: ICD-10-CM

## 2018-12-12 NOTE — TELEPHONE ENCOUNTER
Metoprolol      Last Written Prescription Date:  9/14/18  Last Fill Quantity: 90,   # refills: 0  Last Office Visit: 9/18/18      Losartan      Last Written Prescription Date:  10/16/18  Last Fill Quantity: 90,   # refills: 1  Last Office Visit: 9/18/18

## 2018-12-13 RX ORDER — LOSARTAN POTASSIUM 50 MG/1
TABLET ORAL
Qty: 30 TABLET | Refills: 3 | Status: SHIPPED | OUTPATIENT
Start: 2018-12-13 | End: 2019-08-08

## 2018-12-13 RX ORDER — METOPROLOL SUCCINATE 25 MG/1
TABLET, EXTENDED RELEASE ORAL
Qty: 90 TABLET | Refills: 1 | Status: SHIPPED | OUTPATIENT
Start: 2018-12-13 | End: 2019-06-11

## 2019-06-11 DIAGNOSIS — I10 BENIGN ESSENTIAL HYPERTENSION: ICD-10-CM

## 2019-06-12 RX ORDER — METOPROLOL SUCCINATE 25 MG/1
TABLET, EXTENDED RELEASE ORAL
Qty: 90 TABLET | Refills: 0 | Status: SHIPPED | OUTPATIENT
Start: 2019-06-12 | End: 2019-09-09

## 2019-08-08 DIAGNOSIS — I10 BENIGN ESSENTIAL HYPERTENSION: ICD-10-CM

## 2019-08-08 RX ORDER — LOSARTAN POTASSIUM 50 MG/1
TABLET ORAL
Qty: 30 TABLET | Refills: 2 | Status: SHIPPED | OUTPATIENT
Start: 2019-08-08 | End: 2019-10-04

## 2019-09-23 ENCOUNTER — TELEPHONE (OUTPATIENT)
Dept: FAMILY MEDICINE | Facility: OTHER | Age: 64
End: 2019-09-23

## 2019-09-23 DIAGNOSIS — J20.9 ACUTE BRONCHITIS, UNSPECIFIED ORGANISM: Primary | ICD-10-CM

## 2019-09-23 RX ORDER — AZITHROMYCIN 250 MG/1
TABLET, FILM COATED ORAL
Qty: 6 TABLET | Refills: 0 | Status: SHIPPED | OUTPATIENT
Start: 2019-09-23 | End: 2019-10-04

## 2019-09-23 NOTE — TELEPHONE ENCOUNTER
I sent Zpak to pharmacy.  Please call pt and have her set up an appt within 2 weeks.  Sh eis due for annual lung cancer screening (low dose chest CT)  Mammo  Lab.  OK to use same day jade FRANCIS  [phone]

## 2019-09-23 NOTE — TELEPHONE ENCOUNTER
Attempted call again, phone line still busy. Try again tomorrow. Ashley A. Lechevalier, LPN on 9/23/2019 at 4:48 PM

## 2019-09-23 NOTE — TELEPHONE ENCOUNTER
Pt was given prednisone while in the urgent care on 9/21/19. She was told to call if it was not helping her. Urgent care physician suggested an antibiotic. Please return call to pt at [phone].

## 2019-10-04 ENCOUNTER — ANCILLARY PROCEDURE (OUTPATIENT)
Dept: GENERAL RADIOLOGY | Facility: OTHER | Age: 64
End: 2019-10-04
Attending: NURSE PRACTITIONER
Payer: MEDICARE

## 2019-10-04 ENCOUNTER — OFFICE VISIT (OUTPATIENT)
Dept: FAMILY MEDICINE | Facility: OTHER | Age: 64
End: 2019-10-04
Attending: NURSE PRACTITIONER
Payer: MEDICARE

## 2019-10-04 VITALS
TEMPERATURE: 97 F | SYSTOLIC BLOOD PRESSURE: 130 MMHG | WEIGHT: 129 LBS | OXYGEN SATURATION: 95 % | DIASTOLIC BLOOD PRESSURE: 72 MMHG | HEART RATE: 81 BPM | HEIGHT: 60 IN | BODY MASS INDEX: 25.32 KG/M2

## 2019-10-04 DIAGNOSIS — Z72.0 TOBACCO ABUSE: ICD-10-CM

## 2019-10-04 DIAGNOSIS — I10 BENIGN ESSENTIAL HYPERTENSION: ICD-10-CM

## 2019-10-04 DIAGNOSIS — J20.9 ACUTE BRONCHITIS, UNSPECIFIED ORGANISM: ICD-10-CM

## 2019-10-04 DIAGNOSIS — R93.5 ABNORMAL CT OF THE ABDOMEN: ICD-10-CM

## 2019-10-04 DIAGNOSIS — R07.0 THROAT PAIN: ICD-10-CM

## 2019-10-04 DIAGNOSIS — R19.7 DIARRHEA, UNSPECIFIED TYPE: ICD-10-CM

## 2019-10-04 DIAGNOSIS — E55.9 VITAMIN D DEFICIENCY: ICD-10-CM

## 2019-10-04 DIAGNOSIS — Z12.31 ENCOUNTER FOR SCREENING MAMMOGRAM FOR BREAST CANCER: ICD-10-CM

## 2019-10-04 DIAGNOSIS — Z87.891 PERSONAL HISTORY OF TOBACCO USE: ICD-10-CM

## 2019-10-04 DIAGNOSIS — Z12.39 SCREENING FOR BREAST CANCER: Primary | ICD-10-CM

## 2019-10-04 DIAGNOSIS — Z71.6 TOBACCO ABUSE COUNSELING: ICD-10-CM

## 2019-10-04 LAB
ALBUMIN SERPL-MCNC: 3.4 G/DL (ref 3.4–5)
ALP SERPL-CCNC: 71 U/L (ref 40–150)
ALT SERPL W P-5'-P-CCNC: 21 U/L (ref 0–50)
AMYLASE SERPL-CCNC: 25 U/L (ref 30–110)
ANION GAP SERPL CALCULATED.3IONS-SCNC: 6 MMOL/L (ref 3–14)
AST SERPL W P-5'-P-CCNC: 8 U/L (ref 0–45)
BASOPHILS # BLD AUTO: 0.1 10E9/L (ref 0–0.2)
BASOPHILS NFR BLD AUTO: 1 %
BILIRUB SERPL-MCNC: 0.4 MG/DL (ref 0.2–1.3)
BUN SERPL-MCNC: 9 MG/DL (ref 7–30)
CALCIUM SERPL-MCNC: 9 MG/DL (ref 8.5–10.1)
CHLORIDE SERPL-SCNC: 105 MMOL/L (ref 94–109)
CHOLEST SERPL-MCNC: 208 MG/DL
CO2 SERPL-SCNC: 26 MMOL/L (ref 20–32)
CREAT SERPL-MCNC: 0.54 MG/DL (ref 0.52–1.04)
DEPRECATED S PYO AG THROAT QL EIA: NORMAL
DIFFERENTIAL METHOD BLD: ABNORMAL
EOSINOPHIL # BLD AUTO: 0.1 10E9/L (ref 0–0.7)
EOSINOPHIL NFR BLD AUTO: 1.6 %
ERYTHROCYTE [DISTWIDTH] IN BLOOD BY AUTOMATED COUNT: 12.5 % (ref 10–15)
GFR SERPL CREATININE-BSD FRML MDRD: >90 ML/MIN/{1.73_M2}
GLUCOSE SERPL-MCNC: 82 MG/DL (ref 70–99)
HCT VFR BLD AUTO: 42.2 % (ref 35–47)
HDLC SERPL-MCNC: 49 MG/DL
HGB BLD-MCNC: 13.9 G/DL (ref 11.7–15.7)
LDLC SERPL CALC-MCNC: 138 MG/DL
LIPASE SERPL-CCNC: 61 U/L (ref 73–393)
LYMPHOCYTES # BLD AUTO: 1.1 10E9/L (ref 0.8–5.3)
LYMPHOCYTES NFR BLD AUTO: 17.7 %
MCH RBC QN AUTO: 34.3 PG (ref 26.5–33)
MCHC RBC AUTO-ENTMCNC: 32.9 G/DL (ref 31.5–36.5)
MCV RBC AUTO: 104 FL (ref 78–100)
MONOCYTES # BLD AUTO: 0.5 10E9/L (ref 0–1.3)
MONOCYTES NFR BLD AUTO: 7.6 %
NEUTROPHILS # BLD AUTO: 4.5 10E9/L (ref 1.6–8.3)
NEUTROPHILS NFR BLD AUTO: 72.1 %
NONHDLC SERPL-MCNC: 159 MG/DL
PLATELET # BLD AUTO: 263 10E9/L (ref 150–450)
POTASSIUM SERPL-SCNC: 4.4 MMOL/L (ref 3.4–5.3)
PROT SERPL-MCNC: 7.4 G/DL (ref 6.8–8.8)
RBC # BLD AUTO: 4.05 10E12/L (ref 3.8–5.2)
SODIUM SERPL-SCNC: 137 MMOL/L (ref 133–144)
SPECIMEN SOURCE: NORMAL
TRIGL SERPL-MCNC: 105 MG/DL
TSH SERPL DL<=0.005 MIU/L-ACNC: 0.96 MU/L (ref 0.4–4)
WBC # BLD AUTO: 6.3 10E9/L (ref 4–11)

## 2019-10-04 PROCEDURE — G0463 HOSPITAL OUTPT CLINIC VISIT: HCPCS

## 2019-10-04 PROCEDURE — 71046 X-RAY EXAM CHEST 2 VIEWS: CPT | Mod: TC,FY

## 2019-10-04 PROCEDURE — 85025 COMPLETE CBC W/AUTO DIFF WBC: CPT | Mod: ZL | Performed by: NURSE PRACTITIONER

## 2019-10-04 PROCEDURE — 36415 COLL VENOUS BLD VENIPUNCTURE: CPT | Mod: ZL | Performed by: NURSE PRACTITIONER

## 2019-10-04 PROCEDURE — G0463 HOSPITAL OUTPT CLINIC VISIT: HCPCS | Mod: 25

## 2019-10-04 PROCEDURE — 82306 VITAMIN D 25 HYDROXY: CPT | Mod: ZL | Performed by: NURSE PRACTITIONER

## 2019-10-04 PROCEDURE — 82150 ASSAY OF AMYLASE: CPT | Mod: ZL | Performed by: NURSE PRACTITIONER

## 2019-10-04 PROCEDURE — 87081 CULTURE SCREEN ONLY: CPT | Mod: ZL | Performed by: NURSE PRACTITIONER

## 2019-10-04 PROCEDURE — 96372 THER/PROPH/DIAG INJ SC/IM: CPT

## 2019-10-04 PROCEDURE — 84443 ASSAY THYROID STIM HORMONE: CPT | Mod: ZL | Performed by: NURSE PRACTITIONER

## 2019-10-04 PROCEDURE — 83690 ASSAY OF LIPASE: CPT | Mod: ZL | Performed by: NURSE PRACTITIONER

## 2019-10-04 PROCEDURE — 80053 COMPREHEN METABOLIC PANEL: CPT | Mod: ZL | Performed by: NURSE PRACTITIONER

## 2019-10-04 PROCEDURE — G0296 VISIT TO DETERM LDCT ELIG: HCPCS | Performed by: NURSE PRACTITIONER

## 2019-10-04 PROCEDURE — 80061 LIPID PANEL: CPT | Mod: ZL | Performed by: NURSE PRACTITIONER

## 2019-10-04 PROCEDURE — 87880 STREP A ASSAY W/OPTIC: CPT | Mod: ZL | Performed by: NURSE PRACTITIONER

## 2019-10-04 PROCEDURE — 99215 OFFICE O/P EST HI 40 MIN: CPT | Performed by: NURSE PRACTITIONER

## 2019-10-04 RX ORDER — METOPROLOL SUCCINATE 25 MG/1
25 TABLET, EXTENDED RELEASE ORAL DAILY
Qty: 90 TABLET | Refills: 1 | Status: SHIPPED | OUTPATIENT
Start: 2019-10-04 | End: 2020-06-08

## 2019-10-04 RX ORDER — PREDNISONE 20 MG/1
20 TABLET ORAL 2 TIMES DAILY
Qty: 10 TABLET | Refills: 0 | Status: SHIPPED | OUTPATIENT
Start: 2019-10-04 | End: 2019-10-09

## 2019-10-04 RX ORDER — AZITHROMYCIN 250 MG/1
TABLET, FILM COATED ORAL
Qty: 11 TABLET | Refills: 0 | Status: SHIPPED | OUTPATIENT
Start: 2019-10-04 | End: 2019-10-14

## 2019-10-04 RX ORDER — DEXAMETHASONE SODIUM PHOSPHATE 10 MG/ML
10 INJECTION INTRAMUSCULAR; INTRAVENOUS ONCE
Status: COMPLETED | OUTPATIENT
Start: 2019-10-04 | End: 2019-10-04

## 2019-10-04 RX ORDER — LOSARTAN POTASSIUM 50 MG/1
50 TABLET ORAL DAILY
Qty: 90 TABLET | Refills: 1 | Status: SHIPPED | OUTPATIENT
Start: 2019-10-04 | End: 2020-04-06

## 2019-10-04 RX ORDER — ALBUTEROL SULFATE 90 UG/1
AEROSOL, METERED RESPIRATORY (INHALATION)
Refills: 2 | COMMUNITY
Start: 2019-09-21 | End: 2020-06-14

## 2019-10-04 RX ADMIN — DEXAMETHASONE SODIUM PHOSPHATE 10 MG: 10 INJECTION INTRAMUSCULAR; INTRAVENOUS at 14:25

## 2019-10-04 ASSESSMENT — ANXIETY QUESTIONNAIRES
2. NOT BEING ABLE TO STOP OR CONTROL WORRYING: NOT AT ALL
3. WORRYING TOO MUCH ABOUT DIFFERENT THINGS: NOT AT ALL
GAD7 TOTAL SCORE: 0
7. FEELING AFRAID AS IF SOMETHING AWFUL MIGHT HAPPEN: NOT AT ALL
5. BEING SO RESTLESS THAT IT IS HARD TO SIT STILL: NOT AT ALL
6. BECOMING EASILY ANNOYED OR IRRITABLE: NOT AT ALL
4. TROUBLE RELAXING: NOT AT ALL
1. FEELING NERVOUS, ANXIOUS, OR ON EDGE: NOT AT ALL
IF YOU CHECKED OFF ANY PROBLEMS ON THIS QUESTIONNAIRE, HOW DIFFICULT HAVE THESE PROBLEMS MADE IT FOR YOU TO DO YOUR WORK, TAKE CARE OF THINGS AT HOME, OR GET ALONG WITH OTHER PEOPLE: NOT DIFFICULT AT ALL

## 2019-10-04 ASSESSMENT — MIFFLIN-ST. JEOR: SCORE: 1046.64

## 2019-10-04 ASSESSMENT — PATIENT HEALTH QUESTIONNAIRE - PHQ9: SUM OF ALL RESPONSES TO PHQ QUESTIONS 1-9: 0

## 2019-10-04 ASSESSMENT — PAIN SCALES - GENERAL: PAINLEVEL: MODERATE PAIN (4)

## 2019-10-04 NOTE — PROGRESS NOTES
Rita Fournier is a 64 year old female who presents to clinic today for the following health issues:      Medication Followup of  Losartan and Metoprolol    Taking Medication as prescribed: yes    Side Effects:  None    Medication Helping Symptoms:  yes       Hypertension Follow-up    Do you check your blood pressure regularly outside of the clinic? No     Are you following a low salt diet? No    Are your blood pressures ever more than 140 on the top number (systolic) OR more   than 90 on the bottom number (diastolic), for example 140/90? No       RESPIRATORY SYMPTOMS    Duration: 3 weeks    Description  nasal congestion, sore throat, cough, wheezing, chills, headache, fatigue/malaise and hoarse voice    Severity: moderate    Accompanying signs and symptoms: See Description    History (predisposing factors):  tobacco abuse    Precipitating or alleviating factors: None    Therapies tried and outcome:  rest and fluids Prednisone, Z luc and Inhaler, no relief        Diarrhea, chronic      D deficiency - lab due        PHQ-9 SCORE 4/18/2018 9/18/2018 10/4/2019   PHQ-9 Total Score 0 0 0     ELIZABETH-7 SCORE 4/18/2018 9/18/2018 10/4/2019   Total Score 0 0 0         Patient Active Problem List   Diagnosis     Tobacco abuse     Dermatitis     Alopecia     Vitamin D deficiency     Nipple discharge in female     Benign essential hypertension     Hyperlipidemia LDL goal <100     Gastroesophageal reflux disease without esophagitis     Migraine with aura and without status migrainosus, not intractable     CNH (chondrodermatitis nodularis helicis)     Lichen sclerosus et atrophicus     Other atopic dermatitis and related conditions     Past Surgical History:   Procedure Laterality Date     BIOPSY Right 06/15/2017    US guided right breast needle biopsy     BIOPSY BREAST Bilateral 7/17/2017    Procedure: BIOPSY BREAST;  EXCISIONAL BIOPSY RIGHT BREAST MASS, EXCISION LEFT BREAST SKIN TAG;  Surgeon: Tianna Hanley MD;  Location:  "[facility]     C ORAL SURGERY PROCEDURE  08/23/2018    Had rods put in to prep for implants      CHOLECYSTECTOMY  1975    Cholelithiasis     COLONOSCOPY N/A 1/12/2015    Procedure: COLONOSCOPY;  Surgeon: Tianna Hanley MD;  Location: [facility]     facial redisection gland removal      LEFT > infected neck gland     HYSTERECTOMY TOTAL ABDOMINAL N/A 01/01/1981       Social History     Tobacco Use     Smoking status: Current Every Day Smoker     Packs/day: 2.00     Years: 30.00     Pack years: 60.00     Types: Cigarettes     Smokeless tobacco: Never Used     Tobacco comment: Tried to Quit (YES); Longest Tobacco Free (\"Cutdown\")   Substance Use Topics     Alcohol use: Yes     Comment: OCCASIONALLY     Family History   Problem Relation Age of Onset     Cancer Brother         Liver     Colon Cancer Brother      Cancer Brother         Pancreatic     Cancer Other         Family Hx     Diabetes Father      Cancer Father         Stomach     Heart Disease Father         Heart Disease     Heart Disease Mother         Heart Disease     Cancer Mother 73        Liver     Cancer Sister 57        Pancreatic - Cause of Death             Current Outpatient Medications   Medication Sig Dispense Refill     albuterol (PROAIR HFA/PROVENTIL HFA/VENTOLIN HFA) 108 (90 Base) MCG/ACT inhaler INHALE 2 PUFFS INTO THE LUNGS QID  2     losartan (COZAAR) 50 MG tablet Take 1 tablet (50 mg) by mouth daily 90 tablet 1     metoprolol succinate ER (TOPROL-XL) 25 MG 24 hr tablet Take 1 tablet (25 mg) by mouth daily 90 tablet 1     triamcinolone (KENALOG) 0.1 % ointment Apply sparingly to affected area three times daily for 14 days. 80 g 1     aspirin 81 MG tablet Take 1 tablet (81 mg) by mouth daily (Patient not taking: Reported on 10/4/2019) 100 tablet 3     cholecalciferol (VITAMIN D3) 5000 units TABS tablet Take 1 tablet (5,000 Units) by mouth daily (Patient not taking: Reported on 10/4/2019) 90 tablet 11         Allergies   Allergen Reactions     " Bacitracin      Codeine Nausea and Vomiting     cramping     Doxepin Unknown     face swelling     No Clinical Screening - See Comments Other (See Comments)     face swelling     Penicillins Hives     Provider wants to try Ancef(7/17/17)     Simvastatin Hives         Recent Labs   Lab Test 09/19/18  0936 04/18/18  1124 09/11/17  0920  12/09/14  1346   * 154* 152*  --   --    HDL 47* 56 58  --   --    TRIG 121 97 95  --   --    ALT 26 31  --   --  19   CR 0.60 0.65 0.62   < >  --    GFRESTIMATED >90 >90 >90   < >  --    GFRESTBLACK >90 >90 >90   < >  --    POTASSIUM 4.2 4.8 4.4   < >  --    TSH  --  1.51 1.66  --   --     < > = values in this interval not displayed.      BP Readings from Last 3 Encounters:   10/04/19 130/72   09/18/18 124/78   04/18/18 140/78    Wt Readings from Last 3 Encounters:   10/04/19 58.5 kg (129 lb)   09/18/18 59.6 kg (131 lb 6.4 oz)   04/18/18 59 kg (130 lb)               Reviewed and updated as needed this visit by Provider  Tobacco            Review of Systems   ROS COMP: Constitutional, HEENT, cardiovascular, pulmonary, gi and gu systems are negative, except as otherwise noted.        Objective    /72 (BP Location: Right arm, Patient Position: Sitting, Cuff Size: Adult Regular)   Pulse 81   Temp 97  F (36.1  C) (Tympanic)   Ht 1.524 m (5')   Wt 58.5 kg (129 lb)   SpO2 95%   BMI 25.19 kg/m    Body mass index is 25.19 kg/m .         Physical Exam   GENERAL: healthy, alert and no distress  EYES: Eyes grossly normal to inspection, PERRL and conjunctivae and sclerae normal  HENT: ear canals and TM's normal, nose and mouth without ulcers or lesions  NECK: no adenopathy, no asymmetry, masses, or scars and thyroid normal to palpation  RESP: dry deep cough, scattered wheezes.  CV: regular rate and rhythm, normal S1 S2, no S3 or S4, no murmur, click or rub, no peripheral edema and peripheral pulses strong  MS: no gross musculoskeletal defects noted, no edema  SKIN: no  suspicious lesions or rashes  PSYCH: mentation appears normal, affect normal/bright        Diagnostic Test Results:  Results for orders placed or performed in visit on 10/04/19 (from the past 24 hour(s))   CBC with platelets differential   Result Value Ref Range    WBC 6.3 4.0 - 11.0 10e9/L    RBC Count 4.05 3.8 - 5.2 10e12/L    Hemoglobin 13.9 11.7 - 15.7 g/dL    Hematocrit 42.2 35.0 - 47.0 %     (H) 78 - 100 fl    MCH 34.3 (H) 26.5 - 33.0 pg    MCHC 32.9 31.5 - 36.5 g/dL    RDW 12.5 10.0 - 15.0 %    Platelet Count 263 150 - 450 10e9/L    % Neutrophils 72.1 %    % Lymphocytes 17.7 %    % Monocytes 7.6 %    % Eosinophils 1.6 %    % Basophils 1.0 %    Absolute Neutrophil 4.5 1.6 - 8.3 10e9/L    Absolute Lymphocytes 1.1 0.8 - 5.3 10e9/L    Absolute Monocytes 0.5 0.0 - 1.3 10e9/L    Absolute Eosinophils 0.1 0.0 - 0.7 10e9/L    Absolute Basophils 0.1 0.0 - 0.2 10e9/L    Diff Method Automated Method            Visit time:     Over 40 minutes  spent with the patient.   Greater than 50% of our visit time was spent face to face and included patient education and counseling regarding current conditions.   Medication management, and plan of care.   HTN - med management, plan of care  Tobacco cessation - CT ordered (annual)   Bronchitis - prolonged - fluids, rest, med management  Detailed plan of care is provided, AVS printed  Visit Content:   Referrals: CT's ordered  Diagnostic testing reviewed  Reviewed appropriate outside records  Lab testing reviewed  Any medication adjustment has been reviewed  Health Maintenance   Updated as appropriate.  Record review completed         Lung Cancer Screening Shared Decision Making Visit     Rita Fournier is eligible for lung cancer screening on the basis of the information provided in my signed lung cancer screening order.     I have discussed with patient the risks and benefits of screening for lung cancer with low-dose CT.     The risks include:  radiation exposure: one low  dose chest CT has as much ionizing radiation as about 15 chest x-rays or 6 months of background radiation living in [place]    false positives: 96% of positive findings/nodules are NOT cancer, but some might still require additional diagnostic evaluation, including biopsy  over-diagnosis: some slow growing cancers that might never have been clinically significant will be detected and treated unnecessarily     The benefit of early detection of lung cancer is contingent upon adherence to annual screening or more frequent follow up if indicated.     Furthermore, reaping the benefits of screening requires Rita Fournier to be willing and physically able to undergo diagnostic procedures, if indicated. Although no specific guide is available for determining severity of comorbidities, it is reasonable to withhold screening in patients who have greater mortality risk from other diseases.     We did discuss that the only way to prevent lung cancer is to not smoke. Smoking cessation assistance was offered.    I did offer risk estimation using a calculator such as this one:    ShouldIScreen      Assessment & Plan     1. Acute bronchitis, unspecified organism  - Rapid strep screen  - CBC with platelets differential  - XR CHEST 2 VW (Clinic Performed); Future  - dexamethasone (DECADRON) injection 10 mg  - azithromycin (ZITHROMAX) 250 MG tablet; Take 2 tablets (500 mg) by mouth daily for 1 day, THEN 1 tablet (250 mg) daily for 9 days.  Dispense: 11 tablet; Refill: 0  - predniSONE (DELTASONE) 20 MG tablet; Take 1 tablet (20 mg) by mouth 2 times daily for 5 days  Dispense: 10 tablet; Refill: 0      Fluids  Rest  Temp control  Humidity at home (add bacteriostatic solution to humidifier)  Mucinex liquid OTC PRN  Please return in you do not improve  To UC or ER with persistent, worsening, or worrisome symptoms      2. Throat pain  - Rapid strep screen  - CBC with platelets differential    3. Vitamin D deficiency  - D level  -  Vitamin D3 5000 U daily    4. Screening for breast cancer  - Mammogram ordered    5. Diarrhea, unspecified type  - Amylase  - Lipase    6. Benign essential hypertension  - Comprehensive metabolic panel (BMP + Alb, Alk Phos, ALT, AST, Total. Bili, TP)  - TSH with free T4 reflex  - losartan (COZAAR) 50 MG tablet; Take 1 tablet (50 mg) by mouth daily  Dispense: 90 tablet; Refill: 1  - metoprolol succinate ER (TOPROL-XL) 25 MG 24 hr tablet; Take 1 tablet (25 mg) by mouth daily  Dispense: 90 tablet; Refill: 1  - Lipid Profile    7. Tobacco abuse counseling  - See attached    8. Tobacco abuse  - Tobacco Cessation - Order to Satisfy Health Maintenance    9. Personal history of tobacco use  - Prof fee: Shared Decisionmaking for Lung Cancer Screening  - CT Chest Lung Cancer Scrn Low Dose wo; Future  - Okay for Smoking Cessation Study (PLUTO) to Contact Patient    10. Abnormal CT of the abdomen  - CT Abdomen Pelvis w/o & w Contrast; Future    11. Encounter for screening mammogram for breast cancer  - MA Screen Bilateral w/Mohamud; Future       Tobacco Cessation:   reports that she has been smoking cigarettes. She has a 60.00 pack-year smoking history. She has never used smokeless tobacco.  Tobacco Cessation Action Plan: Self help information given to patient        BMI:   Estimated body mass index is 25.19 kg/m  as calculated from the following:    Height as of this encounter: 1.524 m (5').    Weight as of this encounter: 58.5 kg (129 lb).         Vannessa Anderson CNP  [facility]

## 2019-10-04 NOTE — NURSING NOTE
Chief Complaint   Patient presents with     RECHECK     medication     URI       Initial BP (!) 142/60 (BP Location: Right arm, Patient Position: Sitting, Cuff Size: Adult Regular)   Pulse 81   Temp 97  F (36.1  C) (Tympanic)   Ht 1.524 m (5')   Wt 58.5 kg (129 lb)   SpO2 95%   BMI 25.19 kg/m   Estimated body mass index is 25.19 kg/m  as calculated from the following:    Height as of this encounter: 1.524 m (5').    Weight as of this encounter: 58.5 kg (129 lb).  Medication Reconciliation: complete  Brea Christensen LPN

## 2019-10-04 NOTE — PATIENT INSTRUCTIONS
Assessment & Plan     1. Acute bronchitis, unspecified organism  - Rapid strep screen  - CBC with platelets differential  - XR CHEST 2 VW (Clinic Performed); Future  - dexamethasone (DECADRON) injection 10 mg  - azithromycin (ZITHROMAX) 250 MG tablet; Take 2 tablets (500 mg) by mouth daily for 1 day, THEN 1 tablet (250 mg) daily for 9 days.  Dispense: 11 tablet; Refill: 0  - predniSONE (DELTASONE) 20 MG tablet; Take 1 tablet (20 mg) by mouth 2 times daily for 5 days  Dispense: 10 tablet; Refill: 0      Fluids  Rest  Temp control  Humidity at home (add bacteriostatic solution to humidifier)  Mucinex liquid OTC PRN  Please return in you do not improve  To UC or ER with persistent, worsening, or worrisome symptoms      2. Throat pain  - Rapid strep screen  - CBC with platelets differential    3. Vitamin D deficiency  - D level  - Vitamin D3 5000 U daily    4. Screening for breast cancer  - Mammogram ordered    5. Diarrhea, unspecified type  - Amylase  - Lipase    6. Benign essential hypertension  - Comprehensive metabolic panel (BMP + Alb, Alk Phos, ALT, AST, Total. Bili, TP)  - TSH with free T4 reflex  - losartan (COZAAR) 50 MG tablet; Take 1 tablet (50 mg) by mouth daily  Dispense: 90 tablet; Refill: 1  - metoprolol succinate ER (TOPROL-XL) 25 MG 24 hr tablet; Take 1 tablet (25 mg) by mouth daily  Dispense: 90 tablet; Refill: 1  - Lipid Profile    7. Tobacco abuse counseling  - See attached    8. Tobacco abuse  - Tobacco Cessation - Order to Satisfy Health Maintenance    9. Personal history of tobacco use  - Prof fee: Shared Decisionmaking for Lung Cancer Screening  - CT Chest Lung Cancer Scrn Low Dose wo; Future  - Okay for Smoking Cessation Study (PLUTO) to Contact Patient    10. Abnormal CT of the abdomen  - CT Abdomen Pelvis w/o & w Contrast; Future    11. Encounter for screening mammogram for breast cancer  - MA Screen Bilateral w/Mohamud; Future       Tobacco Cessation:   reports that she has been smoking  cigarettes. She has a 60.00 pack-year smoking history. She has never used smokeless tobacco.  Tobacco Cessation Action Plan: Self help information given to patient        BMI:   Estimated body mass index is 25.19 kg/m  as calculated from the following:    Height as of this encounter: 1.524 m (5').    Weight as of this encounter: 58.5 kg (129 lb).         Vannessa Anderson CNP  [facility]        HOW TO QUIT SMOKING  Smoking is one of the hardest habits to break. About half of all those who have ever smoked have been able to quit, and most of those (about 70%) who still smoke want to quit. Here are some of the best ways to stop smoking.     KEEP TRYING:  It takes most smokers about 8 tries before they are finally able to fully quit. So, the more often you try and fail, the better your chance of quitting the next time! So, don't give up!    GO COLD TURKEY:  Most ex-smokers quit cold turkey. Trying to cut back gradually doesn't seem to work as well, perhaps because it continues the smoking habit. Also, it is possible to fool yourself by inhaling more while smoking fewer cigarettes. This results in the same amount of nicotine in your body!    GET SUPPORT:  Support programs can make an important difference, especially for the heavy smoker. These groups offer lectures, methods to change your behavior and peer support. Call the free national Quitline for more information. 800-QUIT-NOW (102-945-7593). Low-cost or free programs are offered by many hospitals, local chapters of the American Lung Association (680-492-8839) and the American Cancer Society (801-428-1220). Support at home is important too. Non-smokers can help by offering praise and encouragement. If the smoker fails to quit, encourage them to try again!    OVER-THE-COUNTER MEDICINES:  For those who can't quit on their own, Nicotine Replacement Therapy (NRT) may make quitting much easier. Certain aids such as the nicotine patch, gum and lozenge are  available without a prescription. However, it is best to use these under the guidance of your doctor. The skin patch provides a steady supply of nicotine to the body. Nicotine gum and lozenge gives temporary bursts of low levels of nicotine. Both methods take the edge off the craving for cigarettes. WARNING: If you feel symptoms of nicotine overdose, such as nausea, vomiting, dizziness, weakness, or fast heartbeat, stop using these and see your doctor.    PRESCRIPTION MEDICINES:  After evaluating your smoking patterns and prior attempts at quitting, your doctor may offer a prescription medicine such as bupropion (Zyban, Wellbutrin), varenicline (Chantix, Champix), a niocotine inhaler or nasal spray. Each has its unique advantage and side effects which your doctor can review with you.    HEALTH BENEFITS OF QUITTING:  The benefits of quitting start right away and keep improving the longer you go without smokin minutes: blood pressure and pulse return to normal  8 hours: oxygen levels return to normal  2 days: ability to smell and taste begins to improve as damaged nerves start to regrow  2-3 weeks: circulation and lung function improves  1-9 months: decreased cough, congestion and shortness of breath; less tired  1 year: risk of heart attack decreases by half  5 years: risk of lung cancer decreases by half; risk of stroke becomes the same as a non-smoker  For information about how to quit smoking, visit the following links:  National Cancer [place] ,   Clearing the Air, Quit Smoking Today   - an online booklet. http://www.smokefree.gov/pubs/clearing_the_air.pdf  Smokefree.gov http://smokefree.gov/  QuitNet http://www.quitnet.com/    [phone] Radha Tavera, [address] [place] [address], [place] [address]. All rights reserved. This information is not intended as a substitute for professional medical care. Always follow your healthcare professional's instructions.    The Benefits of Living Smoke Free  What do you  want to gain from quitting? Check off some reasons to quit.  Health Benefits  ___ Reduce my risk of lung cancer, heart disease, chronic lung disease  ___ Have fewer wrinkles and softer skin  ___ Improve my sense of taste and smell  ___ For pregnant women--reduce the risk of having a miscarriage, stillbirth, premature birth, or low-birth-weight baby  Personal Benefits  ___ Feel more in control of my life  ___ Have better-smelling hair, breath, clothes, home, and car  ___ Save time by not having to take smoke breaks, buy cigarettes, or hunt for a light  ___ Have whiter teeth  Family Benefits  ___ Reduce my children s respiratory tract infections  ___ Set a good example for my children  ___ Reduce my family s cancer risk  Financial Benefits  ___ Save hundreds of dollars each year that would be spent on cigarettes  ___ Save money on medical bills  ___ Save on life, health, and car insurance premiums    Those Dollars Add Up!  Cigarettes are expensive, and getting more expensive all the time. Do you realize how much money you are spending on cigarettes per year? What is the average amount you spend on a pack of cigarettes? What is the average number of packs that you smoke per day? Using your answers to these questions, fill in this formula to help you find out:  ($ _____ per pack) ×  ( _____ number of packs per day) × (365 days) =  $ _____ yearly cost of smoking  Besides tobacco, there are other costs, including extra cleaning bills and replacement costs for clothing and furniture; medical expenses for smoking-related illnesses; and higher health, life, and car insurance premiums.    Cigars and Pipes Count Too!  Cigars and pipes are also dangerous. So are smokeless (chewing) tobacco and snuff. All of these products contain nicotine, a highly addictive substance that has harmful effects on your body. Quitting smoking means giving up all tobacco products.      [phone] Radha Tavera, [address] [facility], [place]  [address]. All rights reserved. This information is not intended as a substitute for professional medical care. Always follow your healthcare professional's instructions.  Lung Cancer Screening   Frequently Asked Questions  If you are at high-risk for lung cancer, getting screened with low-dose computed tomography (LDCT) every year can help save your life. This handout offers answers to some of the most common questions about lung cancer screening. If you have other questions, please call [phone] ([phone]).     What is it?  Lung cancer screening uses special X-ray technology to create an image of your lung tissue. The exam is quick and easy and takes less than 10 seconds. We don t give you any medicine or use any needles. You can eat before and after the exam. You don t need to change your clothes as long as the clothing on your chest doesn t contain metal. But, you do need to be able to hold your breath for at least 6 seconds during the exam.    What is the goal of lung cancer screening?  The goal of lung cancer screening is to save lives. Many times, lung cancer is not found until a person starts having physical symptoms. Lung cancer screening can help detect lung cancer in the earliest stages when it may be easier to treat.    Who should be screened for lung cancer?  We suggest lung cancer screening for anyone who is at high-risk for lung cancer. You are in the high-risk group if you:      are between the ages of 55 and 79, and    have smoked at least 1 pack of cigarettes a day for 30 or more years, and    still smoke or have quit within the past 15 years.    However, if you have a new cough or shortness of breath, you should talk to your doctor before being screened.    Some national lung health advocacy groups also recommend screening for people ages 50 to 79 who have smoked an average of 1 pack of cigarettes a day for 20 years. They must also have at least 1 other risk factor for lung cancer, not  including exposure to secondhand smoke. Other risk factors are having had cancer in the past, emphysema, pulmonary fibrosis, COPD, a family history of lung cancer, or exposure to certain materials such as arsenic, asbestos, beryllium, cadmium, chromium, diesel fumes, nickel, radon or silica. Your care team can help you know if you have one of these risk factors.     Why does it matter if I have symptoms?  Certain symptoms can be a sign that you have a condition in your lungs that should be checked and treated by your doctor. These symptoms include fever, chest pain, a new or changing cough, shortness of breath that you have never felt before, coughing up blood or unexplained weight loss. Having any of these symptoms can greatly affect the results of lung cancer screening.       Should all smokers get an LDCT lung cancer screening exam?  It depends. Lung cancer screening is for a very specific group of men and women who have a history of heavy smoking over a long period of time (see  Who should be screened for lung cancer  above).  I am in the high-risk group, but have been diagnosed with cancer in the past. Is LDCT lung cancer screening right for me?  In some cases, you should not have LDCT lung screening, such as when your doctor is already following your cancer with CT scan studies. Your doctor will help you decide if LDCT lung screening is right for you.  Do I need to have a screening exam every year?  Yes. If you are in the high-risk group described earlier, you should get an LDCT lung cancer screening exam every year until you are 79, or are no longer willing or able to undergo screening and possible procedures to diagnose and treat lung cancer.  How effective is LDCT at preventing death from lung cancer?  Studies have shown that LDCT lung cancer screening can lower the risk of death from lung cancer by 20 percent in people who are at high-risk.  What are the risks?  There are some risks and limitations of LDCT  lung cancer screening. We want to make sure you understand the risks and benefits, so please let us know if you have any questions. Your doctor may want to talk with you more about these risks.    Radiation exposure: As with any exam that uses radiation, there is a very small increased risk of cancer. The amount of radiation in LDCT is small--about the same amount a person would get from a mammogram. Your doctor orders the exam when he or she feels the potential benefits outweigh the risks.    False negatives: No test is perfect, including LDCT. It is possible that you may have a medical condition, including lung cancer, that is not found during your exam. This is called a false negative result.    False positives and more testing: LDCT very often finds something in the lung that could be cancer, but in fact is not. This is called a false positive result. False positive tests often cause anxiety. To make sure these findings are not cancer, you may need to have more tests. These tests will be done only if you give us permission. Sometimes patients need a treatment that can have side effects, such as a biopsy. For more information on false positives, see  What can I expect from the results?     Findings not related to lung cancer: Your LDCT exam also takes pictures of areas of your body next to your lungs. In a very small number of cases, the CT scan will show an abnormal finding in one of these areas, such as your kidneys, adrenal glands, liver or thyroid. This finding may not be serious, but you may need more tests. Your doctor can help you decide what other tests you may need, if any.  What can I expect from the results?  About 1 out of 4 LDCT exams will find something that may need more tests. Most of the time, these findings are lung nodules. Lung nodules are very small collections of tissue in the lung. These nodules are very common, and the vast majority--more than 97 percent--are not cancer (benign). Most are  normal lymph nodes or small areas of scarring from past infections.  But, if a small lung nodule is found to be cancer, the cancer can be cured more than 90 percent of the time. To know if the nodule is cancer, we may need to get more images before your next yearly screening exam. If the nodule has suspicious features (for example, it is large, has an odd shape or grows over time), we will refer you to a specialist for further testing.  Will my doctor also get the results?  Yes. Your doctor will get a copy of your results.  Is it okay to keep smoking now that there s a cancer screening exam?  No. Tobacco is one of the strongest cancer-causing agents. It causes not only lung cancer, but other cancers and cardiovascular (heart) diseases as well. The damage caused by smoking builds over time. This means that the longer you smoke, the higher your risk of disease. While it is never too late to quit, the sooner you quit, the better.  Where can I find help to quit smoking?  The best way to prevent lung cancer is to stop smoking. If you have already quit smoking, congratulations and keep it up! For help on quitting smoking, please call [place] at 1-440-898-RIAZ (9101) or the American Cancer Society at 1-770.557.2919 to find local resources near you.  One-on-one health coaching:  If you d prefer to work individually with a health care provider on tobacco cessation, we offer:      Medication Therapy Management:  Our specially trained pharmacists work closely with you and your doctor to help you quit smoking.  Call [phone] or [phone] (toll free).     Can Do: Health coaching offered by [facility].  [email or web address]

## 2019-10-04 NOTE — PROGRESS NOTES
Clinic Administered Medication Documentation    MEDICATION LIST:   Injectable Medication Documentation    Patient was given Dexamethasone Sodium Phosphate . Prior to medication administration, verified patients identity using patient s name and date of birth. Please see MAR and medication order for additional information. Patient instructed to remain in clinic for 15 minutes and report any adverse reaction to staff immediately .      Was entire vial of medication used? Yes  Vial/Syringe: Single dose vial  Expiration Date:  49370016  Was this medication supplied by the patient? No   Ashley A. Lechevalier, LPN on 10/4/2019 at 2:59 PM

## 2019-10-05 ASSESSMENT — ANXIETY QUESTIONNAIRES: GAD7 TOTAL SCORE: 0

## 2019-10-06 LAB
BACTERIA SPEC CULT: NORMAL
SPECIMEN SOURCE: NORMAL

## 2019-10-06 NOTE — RESULT ENCOUNTER NOTE
Strep culture negative  Labs are stable other than elevated lipids  Should consider low dose statin, pls let me know if she is willing to take Zocor 20 mg daily    The 10-year ASCVD risk score (Ema ORELLANA Jr., et al., 2013) is: 15.9%    Values used to calculate the score:      Age: 64 years      Sex: Female      Is Non- : No      Diabetic: No      Tobacco smoker: Yes      Systolic Blood Pressure: 130 mmHg      Is BP treated: Yes      HDL Cholesterol: 49 mg/dL      Total Cholesterol: 208 mg/dL

## 2019-10-08 LAB — DEPRECATED CALCIDIOL+CALCIFEROL SERPL-MC: 35 UG/L (ref 20–75)

## 2019-10-11 ENCOUNTER — ANCILLARY PROCEDURE (OUTPATIENT)
Dept: MAMMOGRAPHY | Facility: OTHER | Age: 64
End: 2019-10-11
Attending: NURSE PRACTITIONER
Payer: MEDICARE

## 2019-10-11 ENCOUNTER — HOSPITAL ENCOUNTER (OUTPATIENT)
Dept: CT IMAGING | Facility: HOSPITAL | Age: 64
End: 2019-10-11
Attending: NURSE PRACTITIONER
Payer: MEDICARE

## 2019-10-11 ENCOUNTER — HOSPITAL ENCOUNTER (OUTPATIENT)
Dept: CT IMAGING | Facility: HOSPITAL | Age: 64
Discharge: HOME | End: 2019-10-11
Attending: NURSE PRACTITIONER | Admitting: NURSE PRACTITIONER
Payer: MEDICARE

## 2019-10-11 DIAGNOSIS — Z87.891 PERSONAL HISTORY OF TOBACCO USE: ICD-10-CM

## 2019-10-11 DIAGNOSIS — R93.5 ABNORMAL CT OF THE ABDOMEN: ICD-10-CM

## 2019-10-11 DIAGNOSIS — Z12.31 ENCOUNTER FOR SCREENING MAMMOGRAM FOR BREAST CANCER: ICD-10-CM

## 2019-10-11 PROCEDURE — G0297 LDCT FOR LUNG CA SCREEN: HCPCS | Mod: TC

## 2019-10-11 PROCEDURE — 74177 CT ABD & PELVIS W/CONTRAST: CPT | Mod: TC

## 2019-10-11 PROCEDURE — 77063 BREAST TOMOSYNTHESIS BI: CPT | Mod: TC

## 2019-10-11 PROCEDURE — 25500064 ZZH RX 255 OP 636: Performed by: RADIOLOGY

## 2019-10-11 RX ORDER — IOPAMIDOL 612 MG/ML
100 INJECTION, SOLUTION INTRAVASCULAR ONCE
Status: COMPLETED | OUTPATIENT
Start: 2019-10-11 | End: 2019-10-11

## 2019-10-11 RX ADMIN — DIATRIZOATE MEGLUMINE AND DIATRIZOATE SODIUM 30 ML: 660; 100 SOLUTION ORAL; RECTAL at 14:38

## 2019-10-11 RX ADMIN — IOPAMIDOL 100 ML: 612 INJECTION, SOLUTION INTRAVENOUS at 14:39

## 2019-10-11 NOTE — RESULT ENCOUNTER NOTE
Normal, please notify patient  Recommend annual screening    [facility]  [phone]

## 2019-10-11 NOTE — RESULT ENCOUNTER NOTE
Normal, please notify patient - unchanged from prior  Repeat 1 year    [facility]  [phone]

## 2020-01-26 ENCOUNTER — APPOINTMENT (OUTPATIENT)
Dept: CT IMAGING | Facility: HOSPITAL | Age: 65
End: 2020-01-26
Attending: EMERGENCY MEDICINE
Payer: MEDICARE

## 2020-01-26 ENCOUNTER — HOSPITAL ENCOUNTER (EMERGENCY)
Facility: HOSPITAL | Age: 65
Discharge: HOME | End: 2020-01-26
Attending: EMERGENCY MEDICINE | Admitting: EMERGENCY MEDICINE
Payer: MEDICARE

## 2020-01-26 VITALS
HEART RATE: 63 BPM | DIASTOLIC BLOOD PRESSURE: 84 MMHG | BODY MASS INDEX: 25.39 KG/M2 | WEIGHT: 130 LBS | TEMPERATURE: 97.8 F | SYSTOLIC BLOOD PRESSURE: 144 MMHG | OXYGEN SATURATION: 95 % | RESPIRATION RATE: 16 BRPM

## 2020-01-26 DIAGNOSIS — R20.0 LEFT UPPER EXTREMITY NUMBNESS: ICD-10-CM

## 2020-01-26 DIAGNOSIS — R51.9 OCCIPITAL PAIN: Primary | ICD-10-CM

## 2020-01-26 LAB
ALBUMIN SERPL-MCNC: 3.7 G/DL (ref 3.4–5)
ALBUMIN UR-MCNC: NEGATIVE MG/DL
ALP SERPL-CCNC: 56 U/L (ref 40–150)
ALT SERPL W P-5'-P-CCNC: 23 U/L (ref 0–50)
ANION GAP SERPL CALCULATED.3IONS-SCNC: 4 MMOL/L (ref 3–14)
APPEARANCE UR: CLEAR
AST SERPL W P-5'-P-CCNC: 10 U/L (ref 0–45)
BASOPHILS # BLD AUTO: 0.1 10E9/L (ref 0–0.2)
BASOPHILS NFR BLD AUTO: 1.1 %
BILIRUB SERPL-MCNC: 0.4 MG/DL (ref 0.2–1.3)
BILIRUB UR QL STRIP: NEGATIVE
BUN SERPL-MCNC: 13 MG/DL (ref 7–30)
CALCIUM SERPL-MCNC: 9 MG/DL (ref 8.5–10.1)
CHLORIDE SERPL-SCNC: 108 MMOL/L (ref 94–109)
CO2 SERPL-SCNC: 25 MMOL/L (ref 20–32)
COLOR UR AUTO: NORMAL
CREAT SERPL-MCNC: 0.57 MG/DL (ref 0.52–1.04)
CRP SERPL-MCNC: <2.9 MG/L (ref 0–8)
D DIMER PPP FEU-MCNC: 0.6 UG/ML FEU (ref 0–0.5)
DIFFERENTIAL METHOD BLD: ABNORMAL
EOSINOPHIL # BLD AUTO: 0.1 10E9/L (ref 0–0.7)
EOSINOPHIL NFR BLD AUTO: 1.3 %
ERYTHROCYTE [DISTWIDTH] IN BLOOD BY AUTOMATED COUNT: 12.7 % (ref 10–15)
ERYTHROCYTE [SEDIMENTATION RATE] IN BLOOD BY WESTERGREN METHOD: 11 MM/H (ref 0–30)
GFR SERPL CREATININE-BSD FRML MDRD: >90 ML/MIN/{1.73_M2}
GLUCOSE SERPL-MCNC: 97 MG/DL (ref 70–99)
GLUCOSE UR STRIP-MCNC: NEGATIVE MG/DL
HCT VFR BLD AUTO: 41.8 % (ref 35–47)
HGB BLD-MCNC: 14.4 G/DL (ref 11.7–15.7)
HGB UR QL STRIP: NEGATIVE
IMM GRANULOCYTES # BLD: 0 10E9/L (ref 0–0.4)
IMM GRANULOCYTES NFR BLD: 0.3 %
KETONES UR STRIP-MCNC: NEGATIVE MG/DL
LACTATE BLD-SCNC: 1 MMOL/L (ref 0.7–2)
LEUKOCYTE ESTERASE UR QL STRIP: NEGATIVE
LIPASE SERPL-CCNC: 58 U/L (ref 73–393)
LYMPHOCYTES # BLD AUTO: 1.2 10E9/L (ref 0.8–5.3)
LYMPHOCYTES NFR BLD AUTO: 18.3 %
MCH RBC QN AUTO: 34.7 PG (ref 26.5–33)
MCHC RBC AUTO-ENTMCNC: 34.4 G/DL (ref 31.5–36.5)
MCV RBC AUTO: 101 FL (ref 78–100)
MONOCYTES # BLD AUTO: 0.4 10E9/L (ref 0–1.3)
MONOCYTES NFR BLD AUTO: 6.4 %
NEUTROPHILS # BLD AUTO: 4.6 10E9/L (ref 1.6–8.3)
NEUTROPHILS NFR BLD AUTO: 72.6 %
NITRATE UR QL: NEGATIVE
NRBC # BLD AUTO: 0 10*3/UL
NRBC BLD AUTO-RTO: 0 /100
PH UR STRIP: 5 PH (ref 4.7–8)
PLATELET # BLD AUTO: 210 10E9/L (ref 150–450)
POTASSIUM SERPL-SCNC: 4.3 MMOL/L (ref 3.4–5.3)
PROT SERPL-MCNC: 7.2 G/DL (ref 6.8–8.8)
RBC # BLD AUTO: 4.15 10E12/L (ref 3.8–5.2)
SODIUM SERPL-SCNC: 137 MMOL/L (ref 133–144)
SOURCE: NORMAL
SP GR UR STRIP: 1.01 (ref 1–1.03)
TROPONIN I SERPL-MCNC: <0.015 UG/L (ref 0–0.04)
UROBILINOGEN UR STRIP-MCNC: NORMAL MG/DL (ref 0–2)
WBC # BLD AUTO: 6.4 10E9/L (ref 4–11)

## 2020-01-26 PROCEDURE — 85379 FIBRIN DEGRADATION QUANT: CPT | Performed by: EMERGENCY MEDICINE

## 2020-01-26 PROCEDURE — 70450 CT HEAD/BRAIN W/O DYE: CPT | Mod: TC

## 2020-01-26 PROCEDURE — 83605 ASSAY OF LACTIC ACID: CPT | Performed by: EMERGENCY MEDICINE

## 2020-01-26 PROCEDURE — 93005 ELECTROCARDIOGRAM TRACING: CPT

## 2020-01-26 PROCEDURE — 93010 ELECTROCARDIOGRAM REPORT: CPT | Performed by: INTERNAL MEDICINE

## 2020-01-26 PROCEDURE — 84484 ASSAY OF TROPONIN QUANT: CPT | Performed by: EMERGENCY MEDICINE

## 2020-01-26 PROCEDURE — 36415 COLL VENOUS BLD VENIPUNCTURE: CPT | Performed by: EMERGENCY MEDICINE

## 2020-01-26 PROCEDURE — 86140 C-REACTIVE PROTEIN: CPT | Performed by: EMERGENCY MEDICINE

## 2020-01-26 PROCEDURE — 85652 RBC SED RATE AUTOMATED: CPT | Performed by: EMERGENCY MEDICINE

## 2020-01-26 PROCEDURE — 99284 EMERGENCY DEPT VISIT MOD MDM: CPT | Mod: 25

## 2020-01-26 PROCEDURE — 80053 COMPREHEN METABOLIC PANEL: CPT | Performed by: EMERGENCY MEDICINE

## 2020-01-26 PROCEDURE — 85025 COMPLETE CBC W/AUTO DIFF WBC: CPT | Performed by: EMERGENCY MEDICINE

## 2020-01-26 PROCEDURE — 83690 ASSAY OF LIPASE: CPT | Performed by: EMERGENCY MEDICINE

## 2020-01-26 PROCEDURE — 81003 URINALYSIS AUTO W/O SCOPE: CPT | Performed by: EMERGENCY MEDICINE

## 2020-01-26 PROCEDURE — 99283 EMERGENCY DEPT VISIT LOW MDM: CPT | Mod: Z6 | Performed by: EMERGENCY MEDICINE

## 2020-01-26 ASSESSMENT — ENCOUNTER SYMPTOMS
HEADACHES: 0
ABDOMINAL PAIN: 0
EYE REDNESS: 0
DIFFICULTY URINATING: 0
FEVER: 0
NECK STIFFNESS: 0
ARTHRALGIAS: 0
SHORTNESS OF BREATH: 0
CONFUSION: 0
COLOR CHANGE: 0

## 2020-01-26 NOTE — ED AVS SNAPSHOT
[facility]  [address] [address]  Phone:  [phone]                                    Rita Fournier   MRN: [id number]    Department:  [facility]   Date of Visit:  1/26/2020           After Visit Summary Signature Page    I have received my discharge instructions, and my questions have been answered. I have discussed any challenges I see with this plan with the nurse or doctor.    ..........................................................................................................................................  Patient/Patient Representative Signature      ..........................................................................................................................................  Patient Representative Print Name and Relationship to Patient    ..................................................               ................................................  Date                                   Time    ..........................................................................................................................................  Reviewed by Signature/Title    ...................................................              ..............................................  Date                                               Time          22EPIC Rev 08/18

## 2020-01-26 NOTE — ED NOTES
Discharge instructions reviewed with patient, verbalizes understanding, no questions.  Encouraged to return here, if not getting better, worsening symptoms.  Pt will follow up with MRI.

## 2020-01-26 NOTE — ED NOTES
"BEFAST negative. States \"pressure in head\" comes and goes\". States left hand feels colder than right hand \"and at night the cold left hand wakes me out of my sleep sometimes\".  "

## 2020-01-26 NOTE — ED PROVIDER NOTES
History     Chief Complaint   Patient presents with     pressure in the back of head     left arm cold with numbness at times.     HPI  Rita Fournier is a 64 year old female who presented emergency department with a one-week history of pressure in the back of the head with associated numbness on the left upper extremity.  Symptoms have been present without any prior history of trauma, fever, chills, neck pain or stiffness, visual changes.  The symptoms have been the same and have not improved nor worsened.  Denies any prior history of stroke though patient is on medications for hyperlipidemia and hypertension.  Of note, patient has had multiple dental infections after dental implants and has been on antibiotics on several occasions in the last 1 year more than 9 times.  She is concerned that her symptoms may be related to dental infections.    Allergies:  Allergies   Allergen Reactions     Bacitracin      Codeine Nausea and Vomiting     cramping     Doxepin Unknown     face swelling     No Clinical Screening - See Comments Other (See Comments)     face swelling     Penicillins Hives     Provider wants to try Ancef(7/17/17)     Simvastatin Hives       Problem List:    Patient Active Problem List    Diagnosis Date Noted     Benign essential hypertension 04/18/2018     Priority: Medium     Hyperlipidemia LDL goal <100 04/18/2018     Priority: Medium     Gastroesophageal reflux disease without esophagitis 04/18/2018     Priority: Medium     Migraine with aura and without status migrainosus, not intractable 04/18/2018     Priority: Medium     Nipple discharge in female 07/12/2017     Priority: Medium     Vitamin D deficiency 05/07/2013     Priority: Medium     Dermatitis      Priority: Medium     Alopecia      Priority: Medium     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     Priority: Medium     Lichen sclerosus et atrophicus 10/17/2012     Priority: Medium     Other atopic dermatitis and related  conditions 10/17/2012     Priority: Medium     Tobacco abuse 06/27/2011     Priority: Medium        Past Medical History:    Past Medical History:   Diagnosis Date     Alopecia      Benign essential hypertension 4/18/2018     Dermatitis      Gastroesophageal reflux disease without esophagitis 4/18/2018     Hyperlipidemia LDL goal < 100      Hyperlipidemia LDL goal <100 4/18/2018     Lump of breast, right 7/12/2017     Migraine with aura and without status migrainosus, not intractable 4/18/2018     Nipple discharge in female 7/12/2017     PONV (postoperative nausea and vomiting)      Taking a statin medication 4/18/2018     Tobacco abuse 06/27/2011     Vitamin D deficiency 5/7/2013       Past Surgical History:    Past Surgical History:   Procedure Laterality Date     BIOPSY Right 06/15/2017    US guided right breast needle biopsy     BIOPSY BREAST Bilateral 7/17/2017    Procedure: BIOPSY BREAST;  EXCISIONAL BIOPSY RIGHT BREAST MASS, EXCISION LEFT BREAST SKIN TAG;  Surgeon: Tianna Hanley MD;  Location: [facility]     C ORAL SURGERY PROCEDURE  08/23/2018    Had rods put in to prep for implants      CHOLECYSTECTOMY  1975    Cholelithiasis     COLONOSCOPY N/A 1/12/2015    Procedure: COLONOSCOPY;  Surgeon: Tianna Hanley MD;  Location: [facility]     facial redisection gland removal      LEFT > infected neck gland     HYSTERECTOMY TOTAL ABDOMINAL N/A 01/01/1981       Family History:    Family History   Problem Relation Age of Onset     Cancer Brother         Liver     Colon Cancer Brother      Cancer Brother         Pancreatic     Cancer Other         Family Hx     Diabetes Father      Cancer Father         Stomach     Heart Disease Father         Heart Disease     Heart Disease Mother         Heart Disease     Cancer Mother 73        Liver     Cancer Sister 57        Pancreatic - Cause of Death       Social History:  Marital Status:   [2]  Social History     Tobacco Use     Smoking status: Current Every Day Smoker     " Packs/day: 2.00     Years: 30.00     Pack years: 60.00     Types: Cigarettes     Smokeless tobacco: Never Used     Tobacco comment: Tried to Quit (YES); Longest Tobacco Free (\"Cutdown\")   Substance Use Topics     Alcohol use: Yes     Comment: OCCASIONALLY     Drug use: No        Medications:    losartan (COZAAR) 50 MG tablet  metoprolol succinate ER (TOPROL-XL) 25 MG 24 hr tablet  albuterol (PROAIR HFA/PROVENTIL HFA/VENTOLIN HFA) 108 (90 Base) MCG/ACT inhaler  aspirin 81 MG tablet  cholecalciferol (VITAMIN D3) 5000 units TABS tablet  triamcinolone (KENALOG) 0.1 % ointment          Review of Systems   Constitutional: Negative for fever.   HENT: Negative for congestion.    Eyes: Negative for redness.   Respiratory: Negative for shortness of breath.    Cardiovascular: Negative for chest pain.   Gastrointestinal: Negative for abdominal pain.   Genitourinary: Negative for difficulty urinating.   Musculoskeletal: Negative for arthralgias and neck stiffness.   Skin: Negative for color change.   Neurological: Negative for headaches.   Psychiatric/Behavioral: Negative for confusion.   All other systems reviewed and are negative.      Physical Exam   BP: 155/86  Pulse: 62  Heart Rate: 66  Temp: 97.3  F (36.3  C)  Resp: 16  Weight: 59 kg (130 lb)  SpO2: 96 %      Physical Exam  Vitals signs and nursing note reviewed.   Constitutional:       General: She is not in acute distress.     Appearance: Normal appearance. She is not diaphoretic.   HENT:      Head: Normocephalic and atraumatic.      Mouth/Throat:      Pharynx: No oropharyngeal exudate.   Eyes:      General: No scleral icterus.     Pupils: Pupils are equal, round, and reactive to light.   Cardiovascular:      Rate and Rhythm: Normal rate and regular rhythm.      Heart sounds: Normal heart sounds.   Pulmonary:      Effort: No respiratory distress.      Breath sounds: Normal breath sounds.   Abdominal:      General: Bowel sounds are normal.      Palpations: Abdomen is " soft.      Tenderness: There is no abdominal tenderness.   Musculoskeletal:         General: No tenderness.   Skin:     General: Skin is warm.      Findings: No rash.   Neurological:      Mental Status: She is alert.         ED Course       Procedures      Results for orders placed or performed during the hospital encounter of 01/26/20 (from the past 24 hour(s))   UA reflex to Microscopic and Culture   Result Value Ref Range    Color Urine Light Yellow     Appearance Urine Clear     Glucose Urine Negative NEG^Negative mg/dL    Bilirubin Urine Negative NEG^Negative    Ketones Urine Negative NEG^Negative mg/dL    Specific Gravity Urine 1.012 1.003 - 1.035    Blood Urine Negative NEG^Negative    pH Urine 5.0 4.7 - 8.0 pH    Protein Albumin Urine Negative NEG^Negative mg/dL    Urobilinogen mg/dL Normal 0.0 - 2.0 mg/dL    Nitrite Urine Negative NEG^Negative    Leukocyte Esterase Urine Negative NEG^Negative    Source Midstream Urine    CBC with platelets differential   Result Value Ref Range    WBC 6.4 4.0 - 11.0 10e9/L    RBC Count 4.15 3.8 - 5.2 10e12/L    Hemoglobin 14.4 11.7 - 15.7 g/dL    Hematocrit 41.8 35.0 - 47.0 %     (H) 78 - 100 fl    MCH 34.7 (H) 26.5 - 33.0 pg    MCHC 34.4 31.5 - 36.5 g/dL    RDW 12.7 10.0 - 15.0 %    Platelet Count 210 150 - 450 10e9/L    Diff Method Automated Method     % Neutrophils 72.6 %    % Lymphocytes 18.3 %    % Monocytes 6.4 %    % Eosinophils 1.3 %    % Basophils 1.1 %    % Immature Granulocytes 0.3 %    Nucleated RBCs 0 0 /100    Absolute Neutrophil 4.6 1.6 - 8.3 10e9/L    Absolute Lymphocytes 1.2 0.8 - 5.3 10e9/L    Absolute Monocytes 0.4 0.0 - 1.3 10e9/L    Absolute Eosinophils 0.1 0.0 - 0.7 10e9/L    Absolute Basophils 0.1 0.0 - 0.2 10e9/L    Abs Immature Granulocytes 0.0 0 - 0.4 10e9/L    Absolute Nucleated RBC 0.0    Comprehensive metabolic panel   Result Value Ref Range    Sodium 137 133 - 144 mmol/L    Potassium 4.3 3.4 - 5.3 mmol/L    Chloride 108 94 - 109 mmol/L     Carbon Dioxide 25 20 - 32 mmol/L    Anion Gap 4 3 - 14 mmol/L    Glucose 97 70 - 99 mg/dL    Urea Nitrogen 13 7 - 30 mg/dL    Creatinine 0.57 0.52 - 1.04 mg/dL    GFR Estimate >90 >60 mL/min/[1.73_m2]    GFR Estimate If Black >90 >60 mL/min/[1.73_m2]    Calcium 9.0 8.5 - 10.1 mg/dL    Bilirubin Total 0.4 0.2 - 1.3 mg/dL    Albumin 3.7 3.4 - 5.0 g/dL    Protein Total 7.2 6.8 - 8.8 g/dL    Alkaline Phosphatase 56 40 - 150 U/L    ALT 23 0 - 50 U/L    AST 10 0 - 45 U/L   CRP inflammation   Result Value Ref Range    CRP Inflammation <2.9 0.0 - 8.0 mg/L   D dimer quantitative   Result Value Ref Range    D Dimer 0.6 (H) 0.0 - 0.50 ug/ml FEU   Erythrocyte sedimentation rate auto   Result Value Ref Range    Sed Rate 11 0 - 30 mm/h   Lactic acid whole blood   Result Value Ref Range    Lactic Acid 1.0 0.7 - 2.0 mmol/L   Lipase   Result Value Ref Range    Lipase 58 (L) 73 - 393 U/L   Troponin I   Result Value Ref Range    Troponin I ES <0.015 0.000 - 0.045 ug/L   CT Head w/o Contrast    Narrative    PROCEDURE: CT HEAD W/O CONTRAST     HISTORY: Pressure at the back of the head with left uppper extremity  numbness.    COMPARISON: None.    TECHNIQUE:  Helical images of the head from the foramen magnum to the  vertex were obtained without contrast.    FINDINGS: The ventricles and sulci are normal in volume. No acute  intracranial hemorrhage, mass effect, midline shift, hydrocephalus or  basilar cystern effacement are present.    The grey-white matter interface is preserved.    The calvarium is intact. A calcification is present in the left  maxillary sinus.      Impression    IMPRESSION: No CT evidence of an acute intracranial process.      PAULINE DURON MD       Medications - No data to display    Assessments & Plan (with Medical Decision Making)   Occipital pain:   Left upper extremity numbness:  Presented to the ED with more than a week's history of occipital pain and left upper extremity numbness.  Normal neurological  evaluation at the ED.  Normal muscle bulk, power, tone and reflexes in the all extremities.  Normal CT scan of the head and labs including troponin, lipase, lactic acid, ESR, d-dimer, CRP, CBC, CMP and urinalysis.  Discussed findings with patient and spouse and recommended MRI as outpatient.  Order for MRI as outpatient done and patient will follow-up with PCP as outpatient.  If MRI is normal then patient will require electromyogram for further evaluation of the left upper extremity weakness.  Patient and spouse verbalized understanding of management treatment plan.  Discharged in stable condition of Ms. return to ED if condition worsens.    I have reviewed the nursing notes.    I have reviewed the findings, diagnosis, plan and need for follow up with the patient.    Discharge Medication List as of 1/26/2020  1:17 PM          Final diagnoses:   Occipital pain   Left upper extremity numbness       1/26/2020   [facility]     Lloyd Moser MD  01/26/20 [id number]

## 2020-02-05 ENCOUNTER — HOSPITAL ENCOUNTER (OUTPATIENT)
Dept: MRI IMAGING | Facility: HOSPITAL | Age: 65
Discharge: HOME | End: 2020-02-05
Attending: EMERGENCY MEDICINE | Admitting: EMERGENCY MEDICINE
Payer: MEDICARE

## 2020-02-05 DIAGNOSIS — R20.0 LEFT UPPER EXTREMITY NUMBNESS: ICD-10-CM

## 2020-02-05 DIAGNOSIS — R51.9 OCCIPITAL PAIN: ICD-10-CM

## 2020-02-05 PROCEDURE — 25500064 ZZH RX 255 OP 636: Performed by: RADIOLOGY

## 2020-02-05 PROCEDURE — 70553 MRI BRAIN STEM W/O & W/DYE: CPT | Mod: TC

## 2020-02-05 PROCEDURE — A9585 GADOBUTROL INJECTION: HCPCS | Performed by: RADIOLOGY

## 2020-02-05 RX ORDER — GADOBUTROL 604.72 MG/ML
2 INJECTION INTRAVENOUS ONCE
Status: COMPLETED | OUTPATIENT
Start: 2020-02-05 | End: 2020-02-05

## 2020-02-05 RX ADMIN — GADOBUTROL 2 ML: 604.72 INJECTION INTRAVENOUS at 10:33

## 2020-02-05 RX ADMIN — GADOBUTROL 2 ML: 604.72 INJECTION INTRAVENOUS at 10:32

## 2020-04-06 ENCOUNTER — TELEPHONE (OUTPATIENT)
Dept: FAMILY MEDICINE | Facility: OTHER | Age: 65
End: 2020-04-06

## 2020-04-06 DIAGNOSIS — I10 BENIGN ESSENTIAL HYPERTENSION: ICD-10-CM

## 2020-04-06 RX ORDER — LOSARTAN POTASSIUM 50 MG/1
50 TABLET ORAL DAILY
Qty: 90 TABLET | Refills: 1 | Status: SHIPPED | OUTPATIENT
Start: 2020-04-06 | End: 2020-11-02

## 2020-04-06 NOTE — TELEPHONE ENCOUNTER
BP Readings from Last 3 Encounters:   01/26/20 144/84   10/04/19 130/72   09/18/18 124/78   Pended.Pt of Saul.    Alee Fernandez RN

## 2020-04-06 NOTE — TELEPHONE ENCOUNTER
losartan (COZAAR) 50 MG tablet    Last Written Prescription Date:  10/4/2019          Last Fill 90:  refills: 1  Last Office Visit: 10/4/2019

## 2020-06-05 ENCOUNTER — TELEPHONE (OUTPATIENT)
Dept: FAMILY MEDICINE | Facility: OTHER | Age: 65
End: 2020-06-05

## 2020-06-05 DIAGNOSIS — K04.7 DENTAL ABSCESS: Primary | ICD-10-CM

## 2020-06-05 RX ORDER — CLINDAMYCIN HCL 300 MG
300 CAPSULE ORAL 3 TIMES DAILY
Qty: 30 CAPSULE | Refills: 0 | Status: SHIPPED | OUTPATIENT
Start: 2020-06-05 | End: 2020-07-16

## 2020-06-05 NOTE — TELEPHONE ENCOUNTER
Pt called, reports abscessed tooth. Symptoms started last night. Pt is unable to get in to dentist until next week. Pt requesting abx until she can get into dentist next week. Allergies reviewed. Please advise. Thank you!

## 2020-06-06 DIAGNOSIS — I10 BENIGN ESSENTIAL HYPERTENSION: ICD-10-CM

## 2020-06-08 RX ORDER — METOPROLOL SUCCINATE 25 MG/1
TABLET, EXTENDED RELEASE ORAL
Qty: 90 TABLET | Refills: 0 | Status: SHIPPED | OUTPATIENT
Start: 2020-06-08 | End: 2020-09-02

## 2020-06-08 NOTE — TELEPHONE ENCOUNTER
Toprol      Last Written Prescription Date:  10/04/19  Last Fill Quantity: 90,   # refills: 1  Last Office Visit: 10/04/19  Future Office visit:       Routing refill request to provider for review/approval because:

## 2020-06-08 NOTE — TELEPHONE ENCOUNTER
BP Readings from Last 3 Encounters:   01/26/20 144/84   10/04/19 130/72   09/18/18 124/78     Please note BP's above.Pended.      Alee Fernandez RN

## 2020-06-14 ENCOUNTER — HOSPITAL ENCOUNTER (EMERGENCY)
Facility: HOSPITAL | Age: 65
Discharge: HOME | End: 2020-06-14
Attending: PHYSICIAN ASSISTANT | Admitting: PHYSICIAN ASSISTANT
Payer: MEDICARE

## 2020-06-14 VITALS
RESPIRATION RATE: 14 BRPM | OXYGEN SATURATION: 98 % | SYSTOLIC BLOOD PRESSURE: 173 MMHG | DIASTOLIC BLOOD PRESSURE: 75 MMHG | TEMPERATURE: 98.4 F | HEART RATE: 55 BPM

## 2020-06-14 DIAGNOSIS — R10.30 LOWER ABDOMINAL PAIN: ICD-10-CM

## 2020-06-14 DIAGNOSIS — R11.2 NAUSEA WITH VOMITING: ICD-10-CM

## 2020-06-14 DIAGNOSIS — N39.0 URINARY TRACT INFECTION: ICD-10-CM

## 2020-06-14 LAB
ALBUMIN SERPL-MCNC: 3.6 G/DL (ref 3.4–5)
ALBUMIN UR-MCNC: NEGATIVE MG/DL
ALP SERPL-CCNC: 59 U/L (ref 40–150)
ALT SERPL W P-5'-P-CCNC: 28 U/L (ref 0–50)
ANION GAP SERPL CALCULATED.3IONS-SCNC: 5 MMOL/L (ref 3–14)
APPEARANCE UR: CLEAR
AST SERPL W P-5'-P-CCNC: 17 U/L (ref 0–45)
BACTERIA #/AREA URNS HPF: ABNORMAL /HPF
BASOPHILS # BLD AUTO: 0.1 10E9/L (ref 0–0.2)
BASOPHILS NFR BLD AUTO: 0.9 %
BILIRUB SERPL-MCNC: 0.4 MG/DL (ref 0.2–1.3)
BILIRUB UR QL STRIP: NEGATIVE
BUN SERPL-MCNC: 8 MG/DL (ref 7–30)
CALCIUM SERPL-MCNC: 9 MG/DL (ref 8.5–10.1)
CHLORIDE SERPL-SCNC: 104 MMOL/L (ref 94–109)
CO2 SERPL-SCNC: 24 MMOL/L (ref 20–32)
COLOR UR AUTO: ABNORMAL
CREAT SERPL-MCNC: 0.54 MG/DL (ref 0.52–1.04)
DIFFERENTIAL METHOD BLD: ABNORMAL
EOSINOPHIL # BLD AUTO: 0.1 10E9/L (ref 0–0.7)
EOSINOPHIL NFR BLD AUTO: 1 %
ERYTHROCYTE [DISTWIDTH] IN BLOOD BY AUTOMATED COUNT: 12.7 % (ref 10–15)
GFR SERPL CREATININE-BSD FRML MDRD: >90 ML/MIN/{1.73_M2}
GLUCOSE SERPL-MCNC: 101 MG/DL (ref 70–99)
GLUCOSE UR STRIP-MCNC: NEGATIVE MG/DL
HCT VFR BLD AUTO: 39.4 % (ref 35–47)
HGB BLD-MCNC: 13.6 G/DL (ref 11.7–15.7)
HGB UR QL STRIP: ABNORMAL
IMM GRANULOCYTES # BLD: 0 10E9/L (ref 0–0.4)
IMM GRANULOCYTES NFR BLD: 0.4 %
KETONES UR STRIP-MCNC: 5 MG/DL
LEUKOCYTE ESTERASE UR QL STRIP: ABNORMAL
LIPASE SERPL-CCNC: 46 U/L (ref 73–393)
LYMPHOCYTES # BLD AUTO: 0.9 10E9/L (ref 0.8–5.3)
LYMPHOCYTES NFR BLD AUTO: 13.6 %
MCH RBC QN AUTO: 34.3 PG (ref 26.5–33)
MCHC RBC AUTO-ENTMCNC: 34.5 G/DL (ref 31.5–36.5)
MCV RBC AUTO: 99 FL (ref 78–100)
MONOCYTES # BLD AUTO: 0.5 10E9/L (ref 0–1.3)
MONOCYTES NFR BLD AUTO: 7.8 %
MUCOUS THREADS #/AREA URNS LPF: PRESENT /LPF
NEUTROPHILS # BLD AUTO: 5.2 10E9/L (ref 1.6–8.3)
NEUTROPHILS NFR BLD AUTO: 76.3 %
NITRATE UR QL: NEGATIVE
NRBC # BLD AUTO: 0 10*3/UL
NRBC BLD AUTO-RTO: 0 /100
PH UR STRIP: 5 PH (ref 4.7–8)
PLATELET # BLD AUTO: 194 10E9/L (ref 150–450)
POTASSIUM SERPL-SCNC: 3.8 MMOL/L (ref 3.4–5.3)
PROT SERPL-MCNC: 7.2 G/DL (ref 6.8–8.8)
RBC # BLD AUTO: 3.97 10E12/L (ref 3.8–5.2)
RBC #/AREA URNS AUTO: 1 /HPF (ref 0–2)
SODIUM SERPL-SCNC: 133 MMOL/L (ref 133–144)
SOURCE: ABNORMAL
SP GR UR STRIP: 1.01 (ref 1–1.03)
UROBILINOGEN UR STRIP-MCNC: NORMAL MG/DL (ref 0–2)
WBC # BLD AUTO: 6.8 10E9/L (ref 4–11)
WBC #/AREA URNS AUTO: 9 /HPF (ref 0–5)

## 2020-06-14 PROCEDURE — 99284 EMERGENCY DEPT VISIT MOD MDM: CPT | Mod: Z6 | Performed by: PHYSICIAN ASSISTANT

## 2020-06-14 PROCEDURE — 36415 COLL VENOUS BLD VENIPUNCTURE: CPT | Performed by: PHYSICIAN ASSISTANT

## 2020-06-14 PROCEDURE — 25000128 H RX IP 250 OP 636: Performed by: PHYSICIAN ASSISTANT

## 2020-06-14 PROCEDURE — 83690 ASSAY OF LIPASE: CPT | Performed by: PHYSICIAN ASSISTANT

## 2020-06-14 PROCEDURE — 81001 URINALYSIS AUTO W/SCOPE: CPT | Performed by: PHYSICIAN ASSISTANT

## 2020-06-14 PROCEDURE — 80053 COMPREHEN METABOLIC PANEL: CPT | Performed by: PHYSICIAN ASSISTANT

## 2020-06-14 PROCEDURE — 99283 EMERGENCY DEPT VISIT LOW MDM: CPT

## 2020-06-14 PROCEDURE — 85025 COMPLETE CBC W/AUTO DIFF WBC: CPT | Performed by: PHYSICIAN ASSISTANT

## 2020-06-14 PROCEDURE — 87086 URINE CULTURE/COLONY COUNT: CPT | Performed by: PHYSICIAN ASSISTANT

## 2020-06-14 RX ORDER — ONDANSETRON 4 MG/1
8 TABLET, ORALLY DISINTEGRATING ORAL ONCE
Status: COMPLETED | OUTPATIENT
Start: 2020-06-14 | End: 2020-06-14

## 2020-06-14 RX ORDER — ONDANSETRON 8 MG/1
8 TABLET, ORALLY DISINTEGRATING ORAL EVERY 8 HOURS PRN
Qty: 10 TABLET | Refills: 1 | Status: SHIPPED | OUTPATIENT
Start: 2020-06-14 | End: 2020-07-16

## 2020-06-14 RX ORDER — CEPHALEXIN 500 MG/1
500 CAPSULE ORAL 2 TIMES DAILY
Qty: 14 CAPSULE | Refills: 0 | Status: SHIPPED | OUTPATIENT
Start: 2020-06-14 | End: 2020-07-16

## 2020-06-14 RX ADMIN — ONDANSETRON 8 MG: 4 TABLET, ORALLY DISINTEGRATING ORAL at 19:20

## 2020-06-14 ASSESSMENT — ENCOUNTER SYMPTOMS
VOMITING: 1
DIARRHEA: 0
NAUSEA: 1
BLOOD IN STOOL: 0
FEVER: 0
RESPIRATORY NEGATIVE: 1
APPETITE CHANGE: 1
CONSTIPATION: 0
ABDOMINAL PAIN: 1

## 2020-06-14 NOTE — ED AVS SNAPSHOT
[facility]  [address] [address]  Phone:  [phone]                                    Rita Fournier   MRN: [id number]    Department:  [facility]   Date of Visit:  6/14/2020           After Visit Summary Signature Page    I have received my discharge instructions, and my questions have been answered. I have discussed any challenges I see with this plan with the nurse or doctor.    ..........................................................................................................................................  Patient/Patient Representative Signature      ..........................................................................................................................................  Patient Representative Print Name and Relationship to Patient    ..................................................               ................................................  Date                                   Time    ..........................................................................................................................................  Reviewed by Signature/Title    ...................................................              ..............................................  Date                                               Time          22EPIC Rev 08/18

## 2020-06-14 NOTE — ED NOTES
"Pt ambulatory to ED room 2 with c/o lower abd pain along with 9 episodes of vomiting and \"small sludge\" like stools today. Pt denies dark or bloody stools and denies blood in emesis. Pt also denies fevers. Pt is on day 9 of clindamycin for a dental infection. Pt does have a hx of diverticulitis and states this feels similar \"but worse.\" No tenderness noted on palpation. Pt does not have appendix or gallbladder.   "

## 2020-06-15 NOTE — DISCHARGE INSTRUCTIONS
Drink small amounts of electrolyte fluids constantly.  Return to ED if new fever, severely worsening pain.

## 2020-06-15 NOTE — ED PROVIDER NOTES
"  History     Chief Complaint   Patient presents with     Abdominal Pain     c/o abd pain, nausea, vomiting and diarrhea. pt notes taking clindamycin currently for dental infection.      HPI  Rita Fournier is a 64 year old female who complains of lower abdominal cramping that comes every 20 - 30 minutes that started yesterday and is associated with nausea and vomiting today.  Denies fever, upper respiratory symptoms, cough, diarrhea, dysuria, frequency, urgency.  Reports \"sludge-like\" stools with no blood.  Patient was on her 9th day of clindamycin for dental abscess. Had tooth pulled this past Thursday.      Prior hysterectomy, cholecystectomy, appendectomy.      Allergies:  Allergies   Allergen Reactions     Bacitracin      Codeine Nausea and Vomiting     cramping     Doxepin Unknown     face swelling     No Clinical Screening - See Comments Other (See Comments)     face swelling     Penicillins Hives     Provider wants to try Ancef(7/17/17)     Simvastatin Hives       Problem List:    Patient Active Problem List    Diagnosis Date Noted     Benign essential hypertension 04/18/2018     Priority: Medium     Hyperlipidemia LDL goal <100 04/18/2018     Priority: Medium     Gastroesophageal reflux disease without esophagitis 04/18/2018     Priority: Medium     Migraine with aura and without status migrainosus, not intractable 04/18/2018     Priority: Medium     Nipple discharge in female 07/12/2017     Priority: Medium     Vitamin D deficiency 05/07/2013     Priority: Medium     Dermatitis      Priority: Medium     Alopecia      Priority: Medium     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     Priority: Medium     Lichen sclerosus et atrophicus 10/17/2012     Priority: Medium     Other atopic dermatitis and related conditions 10/17/2012     Priority: Medium     Tobacco abuse 06/27/2011     Priority: Medium        Past Medical History:    Past Medical History:   Diagnosis Date     Alopecia      Benign " "essential hypertension 4/18/2018     Dermatitis      Gastroesophageal reflux disease without esophagitis 4/18/2018     Hyperlipidemia LDL goal < 100      Hyperlipidemia LDL goal <100 4/18/2018     Lump of breast, right 7/12/2017     Migraine with aura and without status migrainosus, not intractable 4/18/2018     Nipple discharge in female 7/12/2017     PONV (postoperative nausea and vomiting)      Taking a statin medication 4/18/2018     Tobacco abuse 06/27/2011     Vitamin D deficiency 5/7/2013       Past Surgical History:    Past Surgical History:   Procedure Laterality Date     BIOPSY Right 06/15/2017    US guided right breast needle biopsy     BIOPSY BREAST Bilateral 7/17/2017    Procedure: BIOPSY BREAST;  EXCISIONAL BIOPSY RIGHT BREAST MASS, EXCISION LEFT BREAST SKIN TAG;  Surgeon: Tianna Hanley MD;  Location: [facility]     C ORAL SURGERY PROCEDURE  08/23/2018    Had rods put in to prep for implants      CHOLECYSTECTOMY  1975    Cholelithiasis     COLONOSCOPY N/A 1/12/2015    Procedure: COLONOSCOPY;  Surgeon: Tianna Hanley MD;  Location: [facility]     facial redisection gland removal      LEFT > infected neck gland     HYSTERECTOMY TOTAL ABDOMINAL N/A 01/01/1981       Family History:    Family History   Problem Relation Age of Onset     Cancer Brother         Liver     Colon Cancer Brother      Cancer Brother         Pancreatic     Cancer Other         Family Hx     Diabetes Father      Cancer Father         Stomach     Heart Disease Father         Heart Disease     Heart Disease Mother         Heart Disease     Cancer Mother 73        Liver     Cancer Sister 57        Pancreatic - Cause of Death       Social History:  Marital Status:   [2]  Social History     Tobacco Use     Smoking status: Current Every Day Smoker     Packs/day: 2.00     Years: 30.00     Pack years: 60.00     Types: Cigarettes     Smokeless tobacco: Never Used     Tobacco comment: Tried to Quit (YES); Longest Tobacco Free (\"Cutdown\") "   Substance Use Topics     Alcohol use: Yes     Comment: OCCASIONALLY     Drug use: No        Medications:    cephALEXin (KEFLEX) 500 MG capsule  clindamycin (CLEOCIN) 300 MG capsule  losartan (COZAAR) 50 MG tablet  metoprolol succinate ER (TOPROL-XL) 25 MG 24 hr tablet  ondansetron (ZOFRAN-ODT) 8 MG ODT tab  aspirin 81 MG tablet  cholecalciferol (VITAMIN D3) 5000 units TABS tablet  triamcinolone (KENALOG) 0.1 % ointment          Review of Systems   Constitutional: Positive for appetite change. Negative for fever.   HENT: Negative.    Respiratory: Negative.    Gastrointestinal: Positive for abdominal pain, nausea and vomiting. Negative for blood in stool, constipation and diarrhea.   Genitourinary: Negative.        Physical Exam   BP: 175/88  Pulse: 55  Heart Rate: 66  Temp: 98.4  F (36.9  C)  Resp: 14  SpO2: 98 %      Physical Exam  Constitutional:       General: She is not in acute distress.     Appearance: She is well-developed. She is not diaphoretic.   HENT:      Head: Normocephalic and atraumatic.   Eyes:      General: No scleral icterus.     Extraocular Movements: Extraocular movements intact.      Pupils: Pupils are equal, round, and reactive to light.   Neck:      Musculoskeletal: Normal range of motion and neck supple.   Cardiovascular:      Rate and Rhythm: Normal rate and regular rhythm.   Pulmonary:      Effort: Pulmonary effort is normal.   Abdominal:      Palpations: Abdomen is soft.      Tenderness: There is no abdominal tenderness. There is no right CVA tenderness or left CVA tenderness.   Skin:     General: Skin is warm and dry.      Coloration: Skin is not pale.      Findings: No erythema or rash.   Neurological:      Mental Status: She is alert and oriented to person, place, and time.         ED Course        Procedures  Abdominal exam unremarkable.  UA shows moderate LE.  Patient asymptomatic with respect to urinary symptoms.  Prescription for cephalexin sent which patient will fill if new  urinary symptoms develop.  Labs otherwise unremarkable.     Recommended patient discontinue Clinda and call dentist.  Return to ED if worsening abdominal pain or new onset fever. Patient noted pain improving at time of discharge.  Prescription for Zofran sent.                    Results for orders placed or performed during the hospital encounter of 06/14/20 (from the past 24 hour(s))   CBC with platelets differential   Result Value Ref Range    WBC 6.8 4.0 - 11.0 10e9/L    RBC Count 3.97 3.8 - 5.2 10e12/L    Hemoglobin 13.6 11.7 - 15.7 g/dL    Hematocrit 39.4 35.0 - 47.0 %    MCV 99 78 - 100 fl    MCH 34.3 (H) 26.5 - 33.0 pg    MCHC 34.5 31.5 - 36.5 g/dL    RDW 12.7 10.0 - 15.0 %    Platelet Count 194 150 - 450 10e9/L    Diff Method Automated Method     % Neutrophils 76.3 %    % Lymphocytes 13.6 %    % Monocytes 7.8 %    % Eosinophils 1.0 %    % Basophils 0.9 %    % Immature Granulocytes 0.4 %    Nucleated RBCs 0 0 /100    Absolute Neutrophil 5.2 1.6 - 8.3 10e9/L    Absolute Lymphocytes 0.9 0.8 - 5.3 10e9/L    Absolute Monocytes 0.5 0.0 - 1.3 10e9/L    Absolute Eosinophils 0.1 0.0 - 0.7 10e9/L    Absolute Basophils 0.1 0.0 - 0.2 10e9/L    Abs Immature Granulocytes 0.0 0 - 0.4 10e9/L    Absolute Nucleated RBC 0.0    Comprehensive metabolic panel   Result Value Ref Range    Sodium 133 133 - 144 mmol/L    Potassium 3.8 3.4 - 5.3 mmol/L    Chloride 104 94 - 109 mmol/L    Carbon Dioxide 24 20 - 32 mmol/L    Anion Gap 5 3 - 14 mmol/L    Glucose 101 (H) 70 - 99 mg/dL    Urea Nitrogen 8 7 - 30 mg/dL    Creatinine 0.54 0.52 - 1.04 mg/dL    GFR Estimate >90 >60 mL/min/[1.73_m2]    GFR Estimate If Black >90 >60 mL/min/[1.73_m2]    Calcium 9.0 8.5 - 10.1 mg/dL    Bilirubin Total 0.4 0.2 - 1.3 mg/dL    Albumin 3.6 3.4 - 5.0 g/dL    Protein Total 7.2 6.8 - 8.8 g/dL    Alkaline Phosphatase 59 40 - 150 U/L    ALT 28 0 - 50 U/L    AST 17 0 - 45 U/L   Lipase   Result Value Ref Range    Lipase 46 (L) 73 - 393 U/L   UA reflex to  Microscopic and Culture    Specimen: Urine clean catch; Midstream Urine   Result Value Ref Range    Color Urine Light Yellow     Appearance Urine Clear     Glucose Urine Negative NEG^Negative mg/dL    Bilirubin Urine Negative NEG^Negative    Ketones Urine 5 (A) NEG^Negative mg/dL    Specific Gravity Urine 1.010 1.003 - 1.035    Blood Urine Trace (A) NEG^Negative    pH Urine 5.0 4.7 - 8.0 pH    Protein Albumin Urine Negative NEG^Negative mg/dL    Urobilinogen mg/dL Normal 0.0 - 2.0 mg/dL    Nitrite Urine Negative NEG^Negative    Leukocyte Esterase Urine Moderate (A) NEG^Negative    Source Midstream Urine     RBC Urine 1 0 - 2 /HPF    WBC Urine 9 (H) 0 - 5 /HPF    Bacteria Urine Few (A) NEG^Negative /HPF    Mucous Urine Present (A) NEG^Negative /LPF       Medications   ondansetron (ZOFRAN-ODT) ODT tab 8 mg (8 mg Oral Given 6/14/20 1920)       Assessments & Plan (with Medical Decision Making)     I have reviewed the nursing notes.    I have reviewed the findings, diagnosis, plan and need for follow up with the patient.    New Prescriptions    CEPHALEXIN (KEFLEX) 500 MG CAPSULE    Take 1 capsule (500 mg) by mouth 2 times daily for 7 days    ONDANSETRON (ZOFRAN-ODT) 8 MG ODT TAB    Take 1 tablet (8 mg) by mouth every 8 hours as needed for nausea       Final diagnoses:   Lower abdominal pain   Nausea with vomiting   Urinary tract infection     YINKA Archuleta on 6/14/2020 at 7:50 PM   6/14/2020   [facility]     Hakan Acosta PA  06/14/20 1950

## 2020-06-16 LAB
BACTERIA SPEC CULT: NORMAL
SPECIMEN SOURCE: NORMAL

## 2020-06-20 ENCOUNTER — TELEPHONE (OUTPATIENT)
Dept: FAMILY MEDICINE | Facility: OTHER | Age: 65
End: 2020-06-20

## 2020-06-20 NOTE — TELEPHONE ENCOUNTER
Received a PA from [employer] for Ondansetron 8 mg ODT.  Not EPA enabled. Completed insurance form and faxed to [employer] with supporting documentation. Waiting for a response.

## 2020-06-22 NOTE — TELEPHONE ENCOUNTER
Received an APPROVAL from [employer] for Ondansetron 8 mg ODT. Effective 03/23/2020 to 06/21/2021. Forms scanned to [facility].

## 2020-07-13 ENCOUNTER — TELEPHONE (OUTPATIENT)
Dept: FAMILY MEDICINE | Facility: OTHER | Age: 65
End: 2020-07-13

## 2020-07-13 DIAGNOSIS — R30.0 DYSURIA: Primary | ICD-10-CM

## 2020-07-13 NOTE — TELEPHONE ENCOUNTER
Patient was seen several weeks ago in ED. Patient states she still has frequent pressure and back ache. Patient denies fever, burning or blood. Patient stated she was advised she should be seen as soon as possible if this does not clear up. Patient states the infection does not seem to be going away. Please advise if she can be seen.

## 2020-07-14 NOTE — TELEPHONE ENCOUNTER
I ordered a UA, she can come in tomorrow for lab  I can then set up a phone visit to address her symptoms    Vannessa FRANCIS  [phone]

## 2020-07-15 NOTE — PROGRESS NOTES
"Subjective     Rita Fournier is a 64 year old female who presents to clinic today for the following health issues:    HPI   URINARY TRACT SYMPTOMS      Duration: 1 month ago she had a tooth abscess and was placed on clindamycin. She started having N&V with diarrhea and lower abdominal pain and was seen in ED. Her clindamycin was stopped and she was state on cephalexin for UTI with positive UA resulting in > 100,000 colonies/ mL mixed domitila.     Description  frequency, hesitancy, back pain and follow-up UTI    Intensity:  moderate    Accompanying signs and symptoms:  Fever/chills: no   Flank pain YES  Nausea and vomiting: no   Vaginal symptoms: none  Abdominal/Pelvic Pain: no     History  History of frequent UTI's: YES  History of kidney stones: no   Sexually Active: YES - rare about 6 months ago   Possibility of pregnancy: No    Precipitating or alleviating factors: None    Therapies tried and outcome: antibiotic   Outcome: not resolved    \"I had bladder tacked up at age 27 and I am a bit worried something may be wrong\"    Patient Active Problem List   Diagnosis     Tobacco abuse     Dermatitis     Alopecia     Vitamin D deficiency     Nipple discharge in female     Benign essential hypertension     Hyperlipidemia LDL goal <100     Gastroesophageal reflux disease without esophagitis     Migraine with aura and without status migrainosus, not intractable     CNH (chondrodermatitis nodularis helicis)     Lichen sclerosus et atrophicus     Other atopic dermatitis and related conditions     Past Surgical History:   Procedure Laterality Date     BIOPSY Right 06/15/2017    US guided right breast needle biopsy     BIOPSY BREAST Bilateral 7/17/2017    Procedure: BIOPSY BREAST;  EXCISIONAL BIOPSY RIGHT BREAST MASS, EXCISION LEFT BREAST SKIN TAG;  Surgeon: Tianna Hanley MD;  Location: [facility]     BLADDER SURGERY  1981    \"bladder tacked up\"     C ORAL SURGERY PROCEDURE  08/23/2018    Had rods put in to prep for implants " "     CHOLECYSTECTOMY  1975    Cholelithiasis     COLONOSCOPY N/A 1/12/2015    Procedure: COLONOSCOPY;  Surgeon: Tianna Hanley MD;  Location: [facility]     facial redisection gland removal      LEFT > infected neck gland     HYSTERECTOMY TOTAL ABDOMINAL N/A 01/01/1981       Social History     Tobacco Use     Smoking status: Current Every Day Smoker     Packs/day: 2.00     Years: 30.00     Pack years: 60.00     Types: Cigarettes     Smokeless tobacco: Never Used     Tobacco comment: Tried to Quit (YES); Longest Tobacco Free (\"Cutdown\")   Substance Use Topics     Alcohol use: Yes     Comment: OCCASIONALLY     Family History   Problem Relation Age of Onset     Cancer Brother         Liver     Colon Cancer Brother      Cancer Brother         Pancreatic     Cancer Other         Family Hx     Diabetes Father      Cancer Father         Stomach     Heart Disease Father         Heart Disease     Heart Disease Mother         Heart Disease     Cancer Mother 73        Liver     Cancer Sister 57        Pancreatic - Cause of Death         Current Outpatient Medications   Medication Sig Dispense Refill     ciprofloxacin (CIPRO) 500 MG tablet Take 1 tablet (500 mg) by mouth 2 times daily for 7 days 14 tablet 0     clobetasol (TEMOVATE) 0.05 % external cream Apply topically 2 times daily Takes for vaginal itching for her LSA per pt. Prescribed by Dr. Santana       clobetasol (TEMOVATE) 0.05 % external cream Apply topically 2 times daily Apply sparingly to affected area up to twice daily as needed. Do not use more than 1 week in a row. 60 g 0     Lactobacillus (PROBIOTIC ACIDOPHILUS PO)        losartan (COZAAR) 50 MG tablet Take 1 tablet (50 mg) by mouth daily 90 tablet 1     metoprolol succinate ER (TOPROL-XL) 25 MG 24 hr tablet TAKE 1 TABLET(25 MG) BY MOUTH DAILY 90 tablet 0     Allergies   Allergen Reactions     Bacitracin      Codeine Nausea and Vomiting     cramping     Doxepin Unknown     face swelling     No Clinical Screening - " See Comments Other (See Comments)     face swelling     Penicillins Hives     Provider wants to try Ancef(7/17/17).  Hives developed within 45 minutes.     Simvastatin Hives     Recent Labs   Lab Test 06/14/20  1846 01/26/20  1237 10/04/19  1353 09/19/18  0936 04/18/18  1124   LDL  --   --  138* 164* 154*   HDL  --   --  49* 47* 56   TRIG  --   --  105 121 97   ALT 28 23 21 26 31   CR 0.54 0.57 0.54 0.60 0.65   GFRESTIMATED >90 >90 >90 >90 >90   GFRESTBLACK >90 >90 >90 >90 >90   POTASSIUM 3.8 4.3 4.4 4.2 4.8   TSH  --   --  0.96  --  1.51      BP Readings from Last 3 Encounters:   07/16/20 122/58   06/14/20 173/75   01/26/20 144/84    Wt Readings from Last 3 Encounters:   07/16/20 59 kg (130 lb)   01/26/20 59 kg (130 lb)   10/04/19 58.5 kg (129 lb)              Reviewed and updated as needed this visit by Provider  Surg Hx         Review of Systems   Constitutional, HEENT, cardiovascular, pulmonary, gi and gu systems are negative, except as otherwise noted.      Objective    /58 (BP Location: Right arm, Patient Position: Sitting, Cuff Size: Adult Regular)   Pulse 72   Temp 97.2  F (36.2  C) (Tympanic)   Resp 20   Ht 1.524 m (5')   Wt 59 kg (130 lb)   SpO2 95%   BMI 25.39 kg/m    Body mass index is 25.39 kg/m .     Physical Exam   GENERAL: healthy, alert and no distress  EYES: Eyes grossly normal to inspection, PERRL and conjunctivae and sclerae normal  RESP: lungs clear to auscultation - no rales, rhonchi or wheezes  CV: regular rate and rhythm, normal S1 S2, no S3 or S4, no murmur, click or rub, no peripheral edema    (female): deferred   SKIN: no suspicious lesions, rashes, or jaundice  BACK: no CVA tenderness  PSYCH: mentation appears normal, affect normal/bright    Results for orders placed or performed in visit on 07/16/20   *UA reflex to Microscopic and Culture - [facility]     Status: Abnormal    Specimen: Midstream Urine   Result Value Ref Range    Color Urine Yellow     Appearance  Urine Clear     Glucose Urine Negative NEG^Negative mg/dL    Bilirubin Urine Negative NEG^Negative    Ketones Urine Negative NEG^Negative mg/dL    Specific Gravity Urine 1.025 1.003 - 1.035    Blood Urine Moderate (A) NEG^Negative    pH Urine 5.5 5.0 - 7.0 pH    Protein Albumin Urine Negative NEG^Negative mg/dL    Urobilinogen Urine 0.2 0.2 - 1.0 EU/dL    Nitrite Urine Negative NEG^Negative    Leukocyte Esterase Urine Trace (A) NEG^Negative    Source Midstream Urine    Urine Microscopic     Status: Abnormal   Result Value Ref Range    WBC Urine 0 - 5 OTO5^0 - 5 /HPF    RBC Urine 2-5 (A) OTO2^O - 2 /HPF    Squamous Epithelial /LPF Urine Moderate (A) FEW^Few /LPF    Bacteria Urine Few (A) NEG^Negative /HPF           Assessment & Plan   During ROS, when asking about vaginal symtpoms, she reported that she has a dx of lichen sclerosus. This is a very sensitive subject and one that she expresses has caused her distress for many years thinking it is very rare and not knowing how she caused it. I reassured her that it is not as rare as she thinks it is and that she did nothing to cause it. She does, however, report that she is currently using a tube of clobetasol that is between 5-10 years old. This is of concern and I am placing a new order as well as a referral to GYN for a pelvic exam as she reportedly has not had an exam since her total hysterectomy in 1981. She also has concerns about possible bladder prolapse and states that she would feel more comfortable with a GYN, which agree would be optimal.    1. Acute cystitis with hematuria  - ciprofloxacin (CIPRO) 500 MG tablet; Take 1 tablet (500 mg) by mouth 2 times daily for 7 days  Dispense: 14 tablet; Refill: 0    2. Lichen sclerosus et atrophicus  - clobetasol (TEMOVATE) 0.05 % external cream; Apply topically 2 times daily Apply sparingly to affected area up to twice daily as needed. Do not use more than 1 week in a row.  Dispense: 60 g; Refill: 0  - OB/GYN  REFERRAL    3. Dysuria  - *UA reflex to Microscopic and Culture - [facility]  - Urine Microscopic       BMI:   Estimated body mass index is 25.39 kg/m  as calculated from the following:    Height as of this encounter: 1.524 m (5').    Weight as of this encounter: 59 kg (130 lb).       Return if symptoms worsen or fail to improve.    Geni Lee, CNP  [facility]

## 2020-07-16 ENCOUNTER — OFFICE VISIT (OUTPATIENT)
Dept: FAMILY MEDICINE | Facility: OTHER | Age: 65
End: 2020-07-16
Attending: NURSE PRACTITIONER
Payer: COMMERCIAL

## 2020-07-16 VITALS
OXYGEN SATURATION: 95 % | WEIGHT: 130 LBS | SYSTOLIC BLOOD PRESSURE: 122 MMHG | DIASTOLIC BLOOD PRESSURE: 58 MMHG | TEMPERATURE: 97.2 F | RESPIRATION RATE: 20 BRPM | HEIGHT: 60 IN | BODY MASS INDEX: 25.52 KG/M2 | HEART RATE: 72 BPM

## 2020-07-16 DIAGNOSIS — N30.01 ACUTE CYSTITIS WITH HEMATURIA: Primary | ICD-10-CM

## 2020-07-16 DIAGNOSIS — L90.0 LICHEN SCLEROSUS ET ATROPHICUS: ICD-10-CM

## 2020-07-16 DIAGNOSIS — R30.0 DYSURIA: ICD-10-CM

## 2020-07-16 LAB
ALBUMIN UR-MCNC: NEGATIVE MG/DL
APPEARANCE UR: CLEAR
BACTERIA #/AREA URNS HPF: ABNORMAL /HPF
BILIRUB UR QL STRIP: NEGATIVE
COLOR UR AUTO: YELLOW
GLUCOSE UR STRIP-MCNC: NEGATIVE MG/DL
HGB UR QL STRIP: ABNORMAL
KETONES UR STRIP-MCNC: NEGATIVE MG/DL
LEUKOCYTE ESTERASE UR QL STRIP: ABNORMAL
NITRATE UR QL: NEGATIVE
NON-SQ EPI CELLS #/AREA URNS LPF: ABNORMAL /LPF
PH UR STRIP: 5.5 PH (ref 5–7)
RBC #/AREA URNS AUTO: ABNORMAL /HPF
SOURCE: ABNORMAL
SP GR UR STRIP: 1.02 (ref 1–1.03)
UROBILINOGEN UR STRIP-ACNC: 0.2 EU/DL (ref 0.2–1)
WBC #/AREA URNS AUTO: ABNORMAL /HPF

## 2020-07-16 PROCEDURE — 81001 URINALYSIS AUTO W/SCOPE: CPT | Mod: ZL | Performed by: NURSE PRACTITIONER

## 2020-07-16 PROCEDURE — 99214 OFFICE O/P EST MOD 30 MIN: CPT | Performed by: NURSE PRACTITIONER

## 2020-07-16 PROCEDURE — G0463 HOSPITAL OUTPT CLINIC VISIT: HCPCS

## 2020-07-16 RX ORDER — CLOBETASOL PROPIONATE 0.5 MG/G
CREAM TOPICAL 2 TIMES DAILY
Qty: 60 G | Refills: 0 | Status: SHIPPED | OUTPATIENT
Start: 2020-07-16 | End: 2021-01-12

## 2020-07-16 RX ORDER — CIPROFLOXACIN 500 MG/1
500 TABLET, FILM COATED ORAL 2 TIMES DAILY
Qty: 14 TABLET | Refills: 0 | Status: SHIPPED | OUTPATIENT
Start: 2020-07-16 | End: 2020-07-23

## 2020-07-16 RX ORDER — CLOBETASOL PROPIONATE 0.5 MG/G
CREAM TOPICAL 2 TIMES DAILY
COMMUNITY
End: 2020-11-02

## 2020-07-16 ASSESSMENT — PAIN SCALES - GENERAL: PAINLEVEL: MODERATE PAIN (4)

## 2020-07-16 ASSESSMENT — MIFFLIN-ST. JEOR: SCORE: 1051.18

## 2020-07-16 NOTE — PATIENT INSTRUCTIONS
Let us know if you do not see an improvement in your urinary symptoms within 3-4 days or if your symptoms worsen or you developed new symptoms

## 2020-07-16 NOTE — NURSING NOTE
Chief Complaint   Patient presents with     Urinary Problem       Initial /58 (BP Location: Right arm, Patient Position: Sitting, Cuff Size: Adult Regular)   Pulse 72   Temp 97.2  F (36.2  C) (Tympanic)   Resp 20   Ht 1.524 m (5')   Wt 59 kg (130 lb)   SpO2 95%   BMI 25.39 kg/m   Estimated body mass index is 25.39 kg/m  as calculated from the following:    Height as of this encounter: 1.524 m (5').    Weight as of this encounter: 59 kg (130 lb).  Medication Reconciliation: complete  Kely Traore LPN

## 2020-07-20 PROBLEM — R31.29 MICROSCOPIC HEMATURIA: Status: ACTIVE | Noted: 2020-07-20

## 2020-07-21 ENCOUNTER — TELEPHONE (OUTPATIENT)
Dept: FAMILY MEDICINE | Facility: OTHER | Age: 65
End: 2020-07-21

## 2020-07-21 ENCOUNTER — OFFICE VISIT (OUTPATIENT)
Dept: OBGYN | Facility: OTHER | Age: 65
End: 2020-07-21
Attending: OBSTETRICS & GYNECOLOGY
Payer: COMMERCIAL

## 2020-07-21 VITALS
DIASTOLIC BLOOD PRESSURE: 78 MMHG | WEIGHT: 128 LBS | SYSTOLIC BLOOD PRESSURE: 116 MMHG | HEIGHT: 60 IN | HEART RATE: 64 BPM | BODY MASS INDEX: 25.13 KG/M2

## 2020-07-21 DIAGNOSIS — R31.29 MICROSCOPIC HEMATURIA: ICD-10-CM

## 2020-07-21 DIAGNOSIS — L90.0 LICHEN SCLEROSUS ET ATROPHICUS: Primary | ICD-10-CM

## 2020-07-21 DIAGNOSIS — N76.3 CHRONIC VULVITIS: ICD-10-CM

## 2020-07-21 PROCEDURE — 99202 OFFICE O/P NEW SF 15 MIN: CPT | Performed by: OBSTETRICS & GYNECOLOGY

## 2020-07-21 PROCEDURE — G0463 HOSPITAL OUTPT CLINIC VISIT: HCPCS | Mod: 25

## 2020-07-21 PROCEDURE — G0463 HOSPITAL OUTPT CLINIC VISIT: HCPCS

## 2020-07-21 RX ORDER — FLUCONAZOLE 100 MG/1
100 TABLET ORAL DAILY
Qty: 15 TABLET | Refills: 0 | Status: SHIPPED | OUTPATIENT
Start: 2020-07-21 | End: 2020-08-05

## 2020-07-21 ASSESSMENT — MIFFLIN-ST. JEOR: SCORE: 1042.1

## 2020-07-21 NOTE — PROGRESS NOTES
CONSULT for [facility] for UTI and LS of Vulva.      S:   Has had LS for over 30 years. Normally uses Clobetasol, but hasn't had any for number of years.  Recent UTI rx'd with Cipro.  Symptoms are better.  Does have some irritation and itching.      O:   White areas of Vulva right and left consistent with LS as well as yeast.    Lab:   UA shows hematuria      A:   Chronic LS of Vulva.        Chronic Vulvitis.        Microscopic hematuria on 2 separate samples.      P:   Clobetasol twice daily and recheck 3 months.  UTI diet.  Urology consult for microscopic hematuria.

## 2020-07-21 NOTE — TELEPHONE ENCOUNTER
Patient calling and stated pharmacy is waiting on a PA for the cobetasol 0.05% cream. Can you please take a look into this at your earliest convenience. Thank you.

## 2020-07-21 NOTE — NURSING NOTE
Chief Complaint   Patient presents with     Consult     Geni Pope       Initial /78   Pulse 64   Ht 1.524 m (5')   Wt 58.1 kg (128 lb)   BMI 25.00 kg/m   Estimated body mass index is 25 kg/m  as calculated from the following:    Height as of this encounter: 1.524 m (5').    Weight as of this encounter: 58.1 kg (128 lb).  Medication Reconciliation: complete  Lisa Prajapati LPN

## 2020-07-21 NOTE — PATIENT INSTRUCTIONS
Discussed LS and the need for Clobetasol.  Discussed UTI prevention.  Discussed need for Urology referral for hematuria eval.

## 2020-07-24 NOTE — TELEPHONE ENCOUNTER
Received a PA from [email or web address] for Clobetasol Propionate 0.05% cream. Not EPA enabled. Completed insurance form and faxed to [employer] with supporting documentation. Waiting for a response.

## 2020-08-12 NOTE — TELEPHONE ENCOUNTER
I called [facility] to get an update to the PA I faxed in on 07/24/20. They stated it was APPROVED. Effective 04/26/2020 to 07/25/2021. Forms scanned to [facility].

## 2020-08-17 DIAGNOSIS — R31.29 MICROSCOPIC HEMATURIA: Primary | ICD-10-CM

## 2020-08-19 ENCOUNTER — OFFICE VISIT (OUTPATIENT)
Dept: UROLOGY | Facility: OTHER | Age: 65
End: 2020-08-19
Attending: OBSTETRICS & GYNECOLOGY
Payer: MEDICARE

## 2020-08-19 VITALS
OXYGEN SATURATION: 95 % | TEMPERATURE: 98.1 F | SYSTOLIC BLOOD PRESSURE: 122 MMHG | RESPIRATION RATE: 20 BRPM | HEART RATE: 81 BPM | DIASTOLIC BLOOD PRESSURE: 62 MMHG

## 2020-08-19 DIAGNOSIS — R31.29 MICROSCOPIC HEMATURIA: ICD-10-CM

## 2020-08-19 LAB
ALBUMIN UR-MCNC: NEGATIVE MG/DL
APPEARANCE UR: CLEAR
BILIRUB UR QL STRIP: NEGATIVE
COLOR UR AUTO: YELLOW
GLUCOSE UR STRIP-MCNC: NEGATIVE MG/DL
HGB UR QL STRIP: NEGATIVE
KETONES UR STRIP-MCNC: NEGATIVE MG/DL
LEUKOCYTE ESTERASE UR QL STRIP: NEGATIVE
NITRATE UR QL: NEGATIVE
PH UR STRIP: 5 PH (ref 4.7–8)
SOURCE: NORMAL
SP GR UR STRIP: 1.02 (ref 1–1.03)
UROBILINOGEN UR STRIP-MCNC: NORMAL MG/DL (ref 0–2)

## 2020-08-19 PROCEDURE — 88108 CYTOPATH CONCENTRATE TECH: CPT | Mod: TC | Performed by: UROLOGY

## 2020-08-19 PROCEDURE — 52000 CYSTOURETHROSCOPY: CPT

## 2020-08-19 PROCEDURE — 81003 URINALYSIS AUTO W/O SCOPE: CPT | Mod: ZL | Performed by: UROLOGY

## 2020-08-19 PROCEDURE — 99203 OFFICE O/P NEW LOW 30 MIN: CPT | Mod: 25 | Performed by: UROLOGY

## 2020-08-19 PROCEDURE — G0463 HOSPITAL OUTPT CLINIC VISIT: HCPCS | Mod: 25

## 2020-08-19 PROCEDURE — 52000 CYSTOURETHROSCOPY: CPT | Performed by: UROLOGY

## 2020-08-19 PROCEDURE — G0463 HOSPITAL OUTPT CLINIC VISIT: HCPCS

## 2020-08-19 ASSESSMENT — PAIN SCALES - GENERAL: PAINLEVEL: NO PAIN (0)

## 2020-08-19 ASSESSMENT — ENCOUNTER SYMPTOMS: HEADACHES: 1

## 2020-08-19 NOTE — PROGRESS NOTES
History     Chief Complaint:    Consult and Hematuria      HPI   Rita Fournier is a 64 year old female who presents with a history of microscopic hematuria and questionable urinary tract infections.  Pushpa states that she has not had a bladder infection for many years but recently she had symptoms where she was voiding frequently every 20 to 30 minutes and she had pressure in the suprapubic area as well as her left flank area.  She ended up having a urinalysis done on June 14 and honestly that really did not look overwhelmingly concerning there was just few bacteria with 9 white cells it was nitrite negative culture grew greater than 100,000 colonies of mixed domitila.  She then had a follow-up urinalysis done July 16 which was negative    Allergies:    Allergies   Allergen Reactions     Bacitracin      Codeine Nausea and Vomiting     cramping     Doxepin Unknown     face swelling     No Clinical Screening - See Comments Other (See Comments)     face swelling     Penicillins Hives     Provider wants to try Ancef(7/17/17).  Hives developed within 45 minutes.     Simvastatin Hives        Medications:      clobetasol (TEMOVATE) 0.05 % external cream  clobetasol (TEMOVATE) 0.05 % external cream  Lactobacillus (PROBIOTIC ACIDOPHILUS PO)  losartan (COZAAR) 50 MG tablet  metoprolol succinate ER (TOPROL-XL) 25 MG 24 hr tablet        Problem List:      Patient Active Problem List    Diagnosis Date Noted     Microscopic hematuria--6/2020, 7/2020 07/20/2020     Priority: Medium     Benign essential hypertension 04/18/2018     Priority: Medium     Hyperlipidemia LDL goal <100 04/18/2018     Priority: Medium     Gastroesophageal reflux disease without esophagitis 04/18/2018     Priority: Medium     Migraine with aura and without status migrainosus, not intractable 04/18/2018     Priority: Medium     Nipple discharge in female 07/12/2017     Priority: Medium     Vitamin D deficiency 05/07/2013     Priority: Medium      "Dermatitis      Priority: Medium     Alopecia      Priority: Medium     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     Priority: Medium     Lichen sclerosus et atrophicus--VULVA 2012, Clobetasol, exam--7/21/2020 10/17/2012     Priority: Medium     Other atopic dermatitis and related conditions 10/17/2012     Priority: Medium     Tobacco abuse 06/27/2011     Priority: Medium        Past Medical History:      Past Medical History:   Diagnosis Date     Alopecia      Benign essential hypertension 4/18/2018     Dermatitis      Gastroesophageal reflux disease without esophagitis 4/18/2018     Hyperlipidemia LDL goal < 100      Hyperlipidemia LDL goal <100 4/18/2018     Lump of breast, right 7/12/2017     Migraine with aura and without status migrainosus, not intractable 4/18/2018     Nipple discharge in female 7/12/2017     PONV (postoperative nausea and vomiting)      Taking a statin medication 4/18/2018     Tobacco abuse 06/27/2011     Vitamin D deficiency 5/7/2013       Past Surgical History:      Past Surgical History:   Procedure Laterality Date     BIOPSY Right 06/15/2017    US guided right breast needle biopsy     BIOPSY BREAST Bilateral 7/17/2017    Procedure: BIOPSY BREAST;  EXCISIONAL BIOPSY RIGHT BREAST MASS, EXCISION LEFT BREAST SKIN TAG;  Surgeon: Tianna Hanley MD;  Location: [facility]     BLADDER SURGERY  1981    \"bladder tacked up\"     C ORAL SURGERY PROCEDURE  08/23/2018    Had rods put in to prep for implants      CHOLECYSTECTOMY  1975    Cholelithiasis     COLONOSCOPY N/A 1/12/2015    Procedure: COLONOSCOPY;  Surgeon: Tianna Hanley MD;  Location: [facility]     facial redisection gland removal      LEFT > infected neck gland     HYSTERECTOMY TOTAL ABDOMINAL N/A 01/01/1981       Family History:      Family History   Problem Relation Age of Onset     Cancer Brother         Liver     Colon Cancer Brother      Cancer Brother         Pancreatic     Cancer Other         Family Hx     Diabetes Father  " "    Cancer Father         Stomach     Heart Disease Father         Heart Disease     Heart Disease Mother         Heart Disease     Cancer Mother 73        Liver     Cancer Sister 57        Pancreatic - Cause of Death       Social History:    Marital Status:   [2]  Social History     Tobacco Use     Smoking status: Current Every Day Smoker     Packs/day: 2.00     Years: 30.00     Pack years: 60.00     Types: Cigarettes     Smokeless tobacco: Never Used     Tobacco comment: Tried to Quit (YES); Longest Tobacco Free (\"Cutdown\")   Substance Use Topics     Alcohol use: Yes     Comment: OCCASIONALLY     Drug use: No        Review of Systems   Neurological: Positive for headaches.   All other systems reviewed and are negative.        Physical Exam   Vitals:  /62   Pulse 81   Temp 98.1  F (36.7  C) (Tympanic)   Resp 20   SpO2 95%       Physical Exam  Constitutional:       Appearance: Normal appearance. She is normal weight.   Cardiovascular:      Rate and Rhythm: Normal rate.   Pulmonary:      Effort: Pulmonary effort is normal.   Abdominal:      General: Abdomen is flat. There is no distension.      Palpations: Abdomen is soft. There is no mass.      Tenderness: There is no abdominal tenderness.      Hernia: No hernia is present.   Genitourinary:     Comments: Patient has significant lichen sclerosis.  Urethral meatus appears normal.  There is no urethral or bladder tenderness or vaginal masses or pelvic masses.  External rectal area is normal normal rectal tone no rectal masses.  Neurological:      Mental Status: She is alert.       Cystoscopy: Risks of bleeding and infection were discussed with the patient.  She was prepped and draped sterilely lidocaine jelly was injected into the urethra.  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  I was able to pop the scope through " that and then the rest of the urethra was normal.  Once inside the bladder both ureteral orifices are normal and there is a couple of small little prominent blood vessels but I do not see anything that is concerning for cancer carcinoma in situ no diverticuli trabeculations or mucosal abnormalities.  When you retroflexed the scope the bladder neck is normal.    Impression: Microhematuria    Plan   Plan: I am not completely convinced that Pushpa had a urinary tract infection.  Her culture was negative I am suspicious that possibly she could have passed a small stone.  Her son has had many stones.  She did have a CT scan in October 2019 that did not show a stone but showed a lesion on her pancreas and they recommended a follow-up in a year.  Because of the hematuria I am going to go ahead and get a CT with IV contrast and we will follow-up on not only the pancreatic lesion but also look at her kidneys.  I will get back to her once I get those results.      No follow-ups on file.    Shayy Patiño MD  [facility]

## 2020-08-19 NOTE — NURSING NOTE
Chief Complaint   Patient presents with     Consult     Hematuria       Initial /62   Pulse 81   Temp 98.1  F (36.7  C) (Tympanic)   Resp 20   SpO2 95%  Estimated body mass index is 25 kg/m  as calculated from the following:    Height as of 7/21/20: 1.524 m (5').    Weight as of 7/21/20: 58.1 kg (128 lb).  Medication Reconciliation: complete   Review of Systems:    Weight loss:    No     Recent fever/chills:  No   Night sweats:   yes  Current skin rash:  No   Recent hair loss:  No  Heat intolerance:  No   Cold intolerance:  No  Chest pain:   No   Palpitations:   No  Shortness of breath:  No   Wheezing:   No  Constipation:    No   Diarrhea:   No   Nausea:   No   Vomiting:   No   Kidney/side pain:  No   Back pain:   No  Frequent headaches:  yes   Dizziness:     No  Leg swelling:   No   Calf pain:    No    Parents, brothers or sisters with history of kidney cancer:   No  Parents, brothers or sisters with history of bladder cancer: No    Missy Nails LPN

## 2020-08-19 NOTE — LETTER
8/19/2020       RE: Rita Fournier  [address]  [facility] [address]     Dear Colleague,    Thank you for referring your patient, Rita Fournier, to the [facility] at [facility]. Please see a copy of my visit note below.      History     Chief Complaint:    Consult and Hematuria      HPI   Rita Fournier is a 64 year old female who presents with a history of microscopic hematuria and questionable urinary tract infections.  Pushpa states that she has not had a bladder infection for many years but recently she had symptoms where she was voiding frequently every 20 to 30 minutes and she had pressure in the suprapubic area as well as her left flank area.  She ended up having a urinalysis done on June 14 and honestly that really did not look overwhelmingly concerning there was just few bacteria with 9 white cells it was nitrite negative culture grew greater than 100,000 colonies of mixed domitila.  She then had a follow-up urinalysis done July 16 which was negative    Allergies:    Allergies   Allergen Reactions     Bacitracin      Codeine Nausea and Vomiting     cramping     Doxepin Unknown     face swelling     No Clinical Screening - See Comments Other (See Comments)     face swelling     Penicillins Hives     Provider wants to try Ancef(7/17/17).  Hives developed within 45 minutes.     Simvastatin Hives        Medications:      clobetasol (TEMOVATE) 0.05 % external cream  clobetasol (TEMOVATE) 0.05 % external cream  Lactobacillus (PROBIOTIC ACIDOPHILUS PO)  losartan (COZAAR) 50 MG tablet  metoprolol succinate ER (TOPROL-XL) 25 MG 24 hr tablet        Problem List:      Patient Active Problem List    Diagnosis Date Noted     Microscopic hematuria--6/2020, 7/2020 07/20/2020     Priority: Medium     Benign essential hypertension 04/18/2018     Priority: Medium     Hyperlipidemia LDL goal <100 04/18/2018     Priority: Medium     Gastroesophageal  "reflux disease without esophagitis 04/18/2018     Priority: Medium     Migraine with aura and without status migrainosus, not intractable 04/18/2018     Priority: Medium     Nipple discharge in female 07/12/2017     Priority: Medium     Vitamin D deficiency 05/07/2013     Priority: Medium     Dermatitis      Priority: Medium     Alopecia      Priority: Medium     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     Priority: Medium     Lichen sclerosus et atrophicus--VULVA 2012, Clobetasol, exam--7/21/2020 10/17/2012     Priority: Medium     Other atopic dermatitis and related conditions 10/17/2012     Priority: Medium     Tobacco abuse 06/27/2011     Priority: Medium        Past Medical History:      Past Medical History:   Diagnosis Date     Alopecia      Benign essential hypertension 4/18/2018     Dermatitis      Gastroesophageal reflux disease without esophagitis 4/18/2018     Hyperlipidemia LDL goal < 100      Hyperlipidemia LDL goal <100 4/18/2018     Lump of breast, right 7/12/2017     Migraine with aura and without status migrainosus, not intractable 4/18/2018     Nipple discharge in female 7/12/2017     PONV (postoperative nausea and vomiting)      Taking a statin medication 4/18/2018     Tobacco abuse 06/27/2011     Vitamin D deficiency 5/7/2013       Past Surgical History:      Past Surgical History:   Procedure Laterality Date     BIOPSY Right 06/15/2017    US guided right breast needle biopsy     BIOPSY BREAST Bilateral 7/17/2017    Procedure: BIOPSY BREAST;  EXCISIONAL BIOPSY RIGHT BREAST MASS, EXCISION LEFT BREAST SKIN TAG;  Surgeon: Tianna Hanley MD;  Location: [facility]     BLADDER SURGERY  1981    \"bladder tacked up\"     C ORAL SURGERY PROCEDURE  08/23/2018    Had rods put in to prep for implants      CHOLECYSTECTOMY  1975    Cholelithiasis     COLONOSCOPY N/A 1/12/2015    Procedure: COLONOSCOPY;  Surgeon: Tianna Hanley MD;  Location: [facility] OR     facial redisection gland removal      LEFT > " "infected neck gland     HYSTERECTOMY TOTAL ABDOMINAL N/A 01/01/1981       Family History:      Family History   Problem Relation Age of Onset     Cancer Brother         Liver     Colon Cancer Brother      Cancer Brother         Pancreatic     Cancer Other         Family Hx     Diabetes Father      Cancer Father         Stomach     Heart Disease Father         Heart Disease     Heart Disease Mother         Heart Disease     Cancer Mother 73        Liver     Cancer Sister 57        Pancreatic - Cause of Death       Social History:    Marital Status:   [2]  Social History     Tobacco Use     Smoking status: Current Every Day Smoker     Packs/day: 2.00     Years: 30.00     Pack years: 60.00     Types: Cigarettes     Smokeless tobacco: Never Used     Tobacco comment: Tried to Quit (YES); Longest Tobacco Free (\"Cutdown\")   Substance Use Topics     Alcohol use: Yes     Comment: OCCASIONALLY     Drug use: No        Review of Systems   Neurological: Positive for headaches.   All other systems reviewed and are negative.        Physical Exam   Vitals:  /62   Pulse 81   Temp 98.1  F (36.7  C) (Tympanic)   Resp 20   SpO2 95%       Physical Exam  Constitutional:       Appearance: Normal appearance. She is normal weight.   Cardiovascular:      Rate and Rhythm: Normal rate.   Pulmonary:      Effort: Pulmonary effort is normal.   Abdominal:      General: Abdomen is flat. There is no distension.      Palpations: Abdomen is soft. There is no mass.      Tenderness: There is no abdominal tenderness.      Hernia: No hernia is present.   Genitourinary:     Comments: Patient has significant lichen sclerosis.  Urethral meatus appears normal.  There is no urethral or bladder tenderness or vaginal masses or pelvic masses.  External rectal area is normal normal rectal tone no rectal masses.  Neurological:      Mental Status: She is alert.       Cystoscopy: Risks of bleeding and infection were discussed with the patient.  She " was prepped and draped sterilely lidocaine jelly was injected into the urethra.  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  I was able to pop the scope through that and then the rest of the urethra was normal.  Once inside the bladder both ureteral orifices are normal and there is a couple of small little prominent blood vessels but I do not see anything that is concerning for cancer carcinoma in situ no diverticuli trabeculations or mucosal abnormalities.  When you retroflexed the scope the bladder neck is normal.    Impression: Microhematuria    Plan   Plan: I am not completely convinced that Pushpa had a urinary tract infection.  Her culture was negative I am suspicious that possibly she could have passed a small stone.  Her son has had many stones.  She did have a CT scan in October 2019 that did not show a stone but showed a lesion on her pancreas and they recommended a follow-up in a year.  Because of the hematuria I am going to go ahead and get a CT with IV contrast and we will follow-up on not only the pancreatic lesion but also look at her kidneys.  I will get back to her once I get those results.      No follow-ups on file.    Shayy Patiño MD  [facility] [place]            Again, thank you for allowing me to participate in the care of your patient.      Sincerely,    Shayy Patiño MD

## 2020-08-19 NOTE — LETTER
August 25, 2020      Rita Lopez  [address] [facility] [address]        Dear ,    We are writing to inform you of your test results.        Resulted Orders   UA reflex to Microscopic and Culture - [place]   Result Value Ref Range    Color Urine Yellow     Appearance Urine Clear     Glucose Urine Negative NEG^Negative mg/dL    Bilirubin Urine Negative NEG^Negative    Ketones Urine Negative NEG^Negative mg/dL    Specific Gravity Urine 1.018 1.003 - 1.035    Blood Urine Negative NEG^Negative    pH Urine 5.0 4.7 - 8.0 pH    Protein Albumin Urine Negative NEG^Negative mg/dL    Urobilinogen mg/dL Normal 0.0 - 2.0 mg/dL    Nitrite Urine Negative NEG^Negative    Leukocyte Esterase Urine Negative NEG^Negative    Source Midstream Urine    Cytology non gyn   Result Value Ref Range    Copath Report       Patient Name: RITA LOPEZ  MR#: [id number]  Specimen #: [id number]  Collected: 8/20/2020  Received: 8/20/2020  Reported: 8/21/2020 14:19  Ordering Phy(s): [facility]    For improved result formatting, select 'View Enhanced Report Format' under   Linked Documents section.    SPECIMEN/STAIN PROCESS:  Urine, voided       Pap-Cyto x 1    ----------------------------------------------------------------    CYTOLOGIC INTERPRETATION:     Urine, voided:   Negative for malignancy  Specimen Adequacy: Satisfactory for evaluation.    Electronically signed out by:    Sven Jimenez M.D.    CLINICAL HISTORY:  Frequency.    ,    GROSS:    Urine, voided:  85cc yellow fluid.  1 pap stained cytospin slide was processed.    CPT Codes:  A: 90132-DGX    COLLECTION SITE:  Client:  [facility]  Location:  [facility] (B)    The technical component of this testing was completed at the [facility], with the  professional component performed at the [facility], [address] [place] [address], [place] [address]  ([phone])         All looks good!  If you have any questions or  concerns, please call the clinic at the number listed above.       Sincerely,        Shayy Patiño MD

## 2020-08-19 NOTE — LETTER
August 25, 2020      Rita Lopez  [address] [facility] [address]        Dear ,    We are writing to inform you of your test results.        Resulted Orders   UA reflex to Microscopic and Culture - [place]   Result Value Ref Range    Color Urine Yellow     Appearance Urine Clear     Glucose Urine Negative NEG^Negative mg/dL    Bilirubin Urine Negative NEG^Negative    Ketones Urine Negative NEG^Negative mg/dL    Specific Gravity Urine 1.018 1.003 - 1.035    Blood Urine Negative NEG^Negative    pH Urine 5.0 4.7 - 8.0 pH    Protein Albumin Urine Negative NEG^Negative mg/dL    Urobilinogen mg/dL Normal 0.0 - 2.0 mg/dL    Nitrite Urine Negative NEG^Negative    Leukocyte Esterase Urine Negative NEG^Negative    Source Midstream Urine    Cytology non gyn   Result Value Ref Range    Copath Report       Patient Name: RITA LOPEZ  MR#: [id number]  Specimen #: [id number]  Collected: 8/20/2020  Received: 8/20/2020  Reported: 8/21/2020 14:19  Ordering Phy(s): [facility]    For improved result formatting, select 'View Enhanced Report Format' under   Linked Documents section.    SPECIMEN/STAIN PROCESS:  Urine, voided       Pap-Cyto x 1    ----------------------------------------------------------------    CYTOLOGIC INTERPRETATION:     Urine, voided:   Negative for malignancy  Specimen Adequacy: Satisfactory for evaluation.    Electronically signed out by:    Sven Jimenez M.D.    CLINICAL HISTORY:  Frequency.    ,    GROSS:    Urine, voided:  85cc yellow fluid.  1 pap stained cytospin slide was processed.    CPT Codes:  A: 24297-FOO    COLLECTION SITE:  Client:  [facility]  Location:  [facility] (B)    The technical component of this testing was completed at the [facility], with the  professional component performed at the [facility], [address] [place] [address], [place] [address]  ([phone])           If you have any questions or concerns,  please call the clinic at the number listed above.       Sincerely,        Shayy Patiño MD

## 2020-08-21 LAB — COPATH REPORT: NORMAL

## 2020-09-02 DIAGNOSIS — I10 BENIGN ESSENTIAL HYPERTENSION: ICD-10-CM

## 2020-09-02 RX ORDER — METOPROLOL SUCCINATE 25 MG/1
TABLET, EXTENDED RELEASE ORAL
Qty: 90 TABLET | Refills: 0 | Status: SHIPPED | OUTPATIENT
Start: 2020-09-02 | End: 2020-11-02

## 2020-09-02 NOTE — TELEPHONE ENCOUNTER
Metoprolol      Last Written Prescription Date:  6.8.2020  Last Fill Quantity: 90,   # refills: 0  Last Office Visit: 7.16.2020

## 2020-09-03 ENCOUNTER — HOSPITAL ENCOUNTER (OUTPATIENT)
Dept: CT IMAGING | Facility: HOSPITAL | Age: 65
Discharge: HOME | End: 2020-09-03
Attending: UROLOGY | Admitting: UROLOGY
Payer: MEDICARE

## 2020-09-03 DIAGNOSIS — R31.29 MICROSCOPIC HEMATURIA: ICD-10-CM

## 2020-09-03 PROCEDURE — 25500064 ZZH RX 255 OP 636: Performed by: RADIOLOGY

## 2020-09-03 PROCEDURE — 74178 CT ABD&PLV WO CNTR FLWD CNTR: CPT | Mod: TC

## 2020-09-03 RX ORDER — IOPAMIDOL 612 MG/ML
100 INJECTION, SOLUTION INTRAVASCULAR ONCE
Status: COMPLETED | OUTPATIENT
Start: 2020-09-03 | End: 2020-09-03

## 2020-09-03 RX ADMIN — IOPAMIDOL 100 ML: 612 INJECTION, SOLUTION INTRAVENOUS at 12:47

## 2020-10-29 PROBLEM — K21.9 GASTROESOPHAGEAL REFLUX DISEASE WITHOUT ESOPHAGITIS: Status: RESOLVED | Noted: 2018-04-18 | Resolved: 2020-10-29

## 2020-10-29 PROBLEM — N64.52 NIPPLE DISCHARGE IN FEMALE: Status: RESOLVED | Noted: 2017-07-12 | Resolved: 2020-10-29

## 2020-10-29 NOTE — PROGRESS NOTES
Rita Fournier is a 65 year old female who presents to clinic today for the following health issues:          PHQ ELIZABETH  mammo due  Colon due  Lung cancer screen  Pneumonia, Tdap-declined      Hyperlipidemia Follow-Up    Are you regularly taking any medication or supplement to lower your cholesterol?  No    Are you having muscle aches or other side effects that you think could be caused by your cholesterol lowering medication?  No    Hypertension Follow-up    Do you check your blood pressure regularly outside of the clinic? No     Are you following a low salt diet? No    Are your blood pressures ever more than 140 on the top number (systolic) OR more   than 90 on the bottom number (diastolic), for example 140/90? Yes    Migraine     Since your last clinic visit, how have your headaches changed?  No change    How often are you getting headaches or migraines? 1 every 10 days     Are you able to do normal daily activities when you have a migraine? Yes    Are you taking rescue/relief medications? (Select all that apply) ibuprofen (Advil, Motrin)    How helpful is your rescue/relief medication?  I get some relief    Are you taking any medications to prevent migraines? (Select all that apply)  No    In the past 4 weeks, how often have you gone to urgent care or the emergency room because of your headaches?  0        How many servings of fruits and vegetables do you eat daily?  2-3    On average, how many sweetened beverages do you drink each day (Examples: soda, juice, sweet tea, etc.  Do NOT count diet or artificially sweetened beverages)?   0    How many days per week do you exercise enough to make your heart beat faster? 3 or less    How many minutes a day do you exercise enough to make your heart beat faster? 30 - 60    How many days per week do you miss taking your medication? 0      Vitamin D deficiency - lab due      Patient Active Problem List   Diagnosis     Dermatitis     Alopecia     Vitamin D deficiency  "    Benign essential hypertension     Hyperlipidemia LDL goal <100     Migraine with aura and without status migrainosus, not intractable     CNH (chondrodermatitis nodularis helicis)     Lichen sclerosus et atrophicus--VULVA 2012, Clobetasol, exam--7/21/2020     Other atopic dermatitis and related conditions     Microscopic hematuria--6/2020, 7/2020     Past Surgical History:   Procedure Laterality Date     BIOPSY Right 06/15/2017    US guided right breast needle biopsy     BIOPSY BREAST Bilateral 7/17/2017    Procedure: BIOPSY BREAST;  EXCISIONAL BIOPSY RIGHT BREAST MASS, EXCISION LEFT BREAST SKIN TAG;  Surgeon: Tianna Hanley MD;  Location: [facility]     BLADDER SURGERY  1981    \"bladder tacked up\"     C ORAL SURGERY PROCEDURE  08/23/2018    Had rods put in to prep for implants      CHOLECYSTECTOMY  1975    Cholelithiasis     COLONOSCOPY N/A 1/12/2015    Procedure: COLONOSCOPY;  Surgeon: Tianna Hanley MD;  Location: [facility]     facial redisection gland removal      LEFT > infected neck gland     HYSTERECTOMY TOTAL ABDOMINAL N/A 01/01/1981       Social History     Tobacco Use     Smoking status: Current Every Day Smoker     Packs/day: 2.00     Years: 30.00     Pack years: 60.00     Types: Cigarettes     Smokeless tobacco: Never Used     Tobacco comment: Tried to Quit (YES); Longest Tobacco Free (\"Cutdown\")   Substance Use Topics     Alcohol use: Yes     Comment: OCCASIONALLY     Family History   Problem Relation Age of Onset     Cancer Brother         Liver     Colon Cancer Brother      Cancer Brother         Pancreatic     Cancer Other         Family Hx     Diabetes Father      Cancer Father         Stomach     Heart Disease Father         Heart Disease     Heart Disease Mother         Heart Disease     Cancer Mother 73        Liver     Cancer Sister 57        Pancreatic - Cause of Death           Current Outpatient Medications   Medication Sig Dispense Refill     clobetasol (TEMOVATE) 0.05 % external cream " Apply topically 2 times daily Takes for vaginal itching for her LSA per pt. Prescribed by Dr. Santana       clobetasol (TEMOVATE) 0.05 % external cream Apply topically 2 times daily Apply sparingly to affected area up to twice daily as needed. Do not use more than 1 week in a row. 60 g 0     Lactobacillus (PROBIOTIC ACIDOPHILUS PO)        losartan (COZAAR) 50 MG tablet Take 1 tablet (50 mg) by mouth daily 90 tablet 1     metoprolol succinate ER (TOPROL-XL) 25 MG 24 hr tablet TAKE 1 TABLET(25 MG) BY MOUTH DAILY 90 tablet 0       Allergies   Allergen Reactions     Bacitracin      Codeine Nausea and Vomiting     cramping     Doxepin Unknown     face swelling     No Clinical Screening - See Comments Other (See Comments)     face swelling     Penicillins Hives     Provider wants to try Ancef(7/17/17).  Hives developed within 45 minutes.     Simvastatin Hives       Recent Labs   Lab Test 06/14/20  1846 01/26/20  1237 10/04/19  1353 09/19/18  0936 04/18/18  1124   LDL  --   --  138* 164* 154*   HDL  --   --  49* 47* 56   TRIG  --   --  105 121 97   ALT 28 23 21 26 31   CR 0.54 0.57 0.54 0.60 0.65   GFRESTIMATED >90 >90 >90 >90 >90   GFRESTBLACK >90 >90 >90 >90 >90   POTASSIUM 3.8 4.3 4.4 4.2 4.8   TSH  --   --  0.96  --  1.51        BP Readings from Last 3 Encounters:   11/02/20 118/62   08/19/20 122/62   07/21/20 116/78    Wt Readings from Last 3 Encounters:   11/02/20 58.1 kg (128 lb)   07/21/20 58.1 kg (128 lb)   07/16/20 59 kg (130 lb)              Review of Systems   Constitutional, HEENT, cardiovascular, pulmonary, GI, , musculoskeletal, neuro, skin, endocrine and psych systems are negative, except as otherwise noted.        Objective    /62 (BP Location: Right arm, Patient Position: Chair, Cuff Size: Adult Regular)   Pulse 68   Temp 97.8  F (36.6  C) (Tympanic)   Ht 1.524 m (5')   Wt 58.1 kg (128 lb)   SpO2 98%   BMI 25.00 kg/m    Body mass index is 25 kg/m .       Physical Exam   GENERAL: healthy, alert  and no distress  EYES: Eyes grossly normal to inspection, PERRL and conjunctivae and sclerae normal  HENT: ear canals and TM's normal, nose and mouth without ulcers or lesions  NECK: no adenopathy, no asymmetry, masses, or scars and thyroid normal to palpation  RESP: lungs clear to auscultation - no rales, rhonchi or wheezes  CV: regular rate and rhythm, normal S1 S2, no S3 or S4, no murmur, click or rub, no peripheral edema and peripheral pulses strong  MS: no gross musculoskeletal defects noted, no edema  SKIN: no suspicious lesions or rashes  PSYCH: mentation appears normal, affect normal/bright        Lung Cancer Screening Shared Decision Making Visit     Rita Fournier is eligible for lung cancer screening on the basis of the information provided in my signed lung cancer screening order.     I have discussed with patient the risks and benefits of screening for lung cancer with low-dose CT.     The risks include:  radiation exposure: one low dose chest CT has as much ionizing radiation as about 15 chest x-rays or 6 months of background radiation living in [place]    false positives: 96% of positive findings/nodules are NOT cancer, but some might still require additional diagnostic evaluation, including biopsy  over-diagnosis: some slow growing cancers that might never have been clinically significant will be detected and treated unnecessarily     The benefit of early detection of lung cancer is contingent upon adherence to annual screening or more frequent follow up if indicated.     Furthermore, reaping the benefits of screening requires Rita Fournier to be willing and physically able to undergo diagnostic procedures, if indicated. Although no specific guide is available for determining severity of comorbidities, it is reasonable to withhold screening in patients who have greater mortality risk from other diseases.     We did discuss that the only way to prevent lung cancer is to not smoke. Smoking  cessation counseling was given, duration < 3 minutes.      I did offer risk estimation using a calculator such as this one:    ShouldIScreen        Assessment & Plan     Benign essential hypertension  - losartan (COZAAR) 50 MG tablet; Take 1 tablet (50 mg) by mouth daily  - metoprolol succinate ER (TOPROL-XL) 25 MG 24 hr tablet; Take 1 tablet (25 mg) by mouth daily  - TSH with free T4 reflex    Lichen sclerosus et atrophicus  - clobetasol (TEMOVATE) 0.05 % external cream; Apply topically 2 times daily Takes for vaginal itching for her LSA per pt.    Migraine with aura and without status migrainosus, not intractable  - Follow-up as needed    Hyperlipidemia LDL goal <100  - Lipid Profile (Chol, Trig, HDL, LDL calc)    Tobacco abuse  - Discussed options    Vitamin D deficiency  - D3 5000 U OTC  - Vitamin D level    Personal history of tobacco use  - Prof fee: Shared Decisionmaking for Lung Cancer Screening  - CT Chest Lung Cancer Scrn Low Dose wo; Future    Encounter for screening mammogram for malignant neoplasm of breast  - MA SCREENING DIGITAL BILATERAL ([place]); Future       Tobacco Cessation:   reports that she has been smoking cigarettes. She has a 60.00 pack-year smoking history. She has never used smokeless tobacco.  Tobacco Cessation Action Plan: Information offered: Patient not interested at this time         See Patient Instructions    Vannessa Anderson CNP  [facility]

## 2020-11-02 ENCOUNTER — OFFICE VISIT (OUTPATIENT)
Dept: FAMILY MEDICINE | Facility: OTHER | Age: 65
End: 2020-11-02
Attending: NURSE PRACTITIONER
Payer: MEDICARE

## 2020-11-02 VITALS
DIASTOLIC BLOOD PRESSURE: 62 MMHG | HEART RATE: 68 BPM | BODY MASS INDEX: 25.13 KG/M2 | SYSTOLIC BLOOD PRESSURE: 118 MMHG | TEMPERATURE: 97.8 F | WEIGHT: 128 LBS | OXYGEN SATURATION: 98 % | HEIGHT: 60 IN

## 2020-11-02 DIAGNOSIS — F17.200 TOBACCO USE DISORDER: ICD-10-CM

## 2020-11-02 DIAGNOSIS — E55.9 VITAMIN D DEFICIENCY: ICD-10-CM

## 2020-11-02 DIAGNOSIS — Z87.891 PERSONAL HISTORY OF TOBACCO USE: ICD-10-CM

## 2020-11-02 DIAGNOSIS — E78.5 HYPERLIPIDEMIA LDL GOAL <100: ICD-10-CM

## 2020-11-02 DIAGNOSIS — G43.109 MIGRAINE WITH AURA AND WITHOUT STATUS MIGRAINOSUS, NOT INTRACTABLE: Primary | ICD-10-CM

## 2020-11-02 DIAGNOSIS — Z23 NEED FOR VACCINATION: ICD-10-CM

## 2020-11-02 DIAGNOSIS — L90.0 LICHEN SCLEROSUS ET ATROPHICUS: ICD-10-CM

## 2020-11-02 DIAGNOSIS — Z12.31 ENCOUNTER FOR SCREENING MAMMOGRAM FOR MALIGNANT NEOPLASM OF BREAST: ICD-10-CM

## 2020-11-02 DIAGNOSIS — I10 BENIGN ESSENTIAL HYPERTENSION: ICD-10-CM

## 2020-11-02 LAB
CHOLEST SERPL-MCNC: 204 MG/DL
HDLC SERPL-MCNC: 55 MG/DL
LDLC SERPL CALC-MCNC: 118 MG/DL
NONHDLC SERPL-MCNC: 149 MG/DL
TRIGL SERPL-MCNC: 153 MG/DL
TSH SERPL DL<=0.005 MIU/L-ACNC: 1.44 MU/L (ref 0.4–4)

## 2020-11-02 PROCEDURE — G0463 HOSPITAL OUTPT CLINIC VISIT: HCPCS | Mod: 25

## 2020-11-02 PROCEDURE — G0463 HOSPITAL OUTPT CLINIC VISIT: HCPCS

## 2020-11-02 PROCEDURE — 82306 VITAMIN D 25 HYDROXY: CPT | Mod: ZL | Performed by: NURSE PRACTITIONER

## 2020-11-02 PROCEDURE — 36415 COLL VENOUS BLD VENIPUNCTURE: CPT | Mod: ZL | Performed by: NURSE PRACTITIONER

## 2020-11-02 PROCEDURE — 80061 LIPID PANEL: CPT | Mod: ZL | Performed by: NURSE PRACTITIONER

## 2020-11-02 PROCEDURE — G0296 VISIT TO DETERM LDCT ELIG: HCPCS | Performed by: NURSE PRACTITIONER

## 2020-11-02 PROCEDURE — 84443 ASSAY THYROID STIM HORMONE: CPT | Mod: ZL | Performed by: NURSE PRACTITIONER

## 2020-11-02 PROCEDURE — 90472 IMMUNIZATION ADMIN EACH ADD: CPT

## 2020-11-02 PROCEDURE — G0009 ADMIN PNEUMOCOCCAL VACCINE: HCPCS

## 2020-11-02 PROCEDURE — 99214 OFFICE O/P EST MOD 30 MIN: CPT | Performed by: NURSE PRACTITIONER

## 2020-11-02 PROCEDURE — 90732 PPSV23 VACC 2 YRS+ SUBQ/IM: CPT | Performed by: NURSE PRACTITIONER

## 2020-11-02 RX ORDER — LOSARTAN POTASSIUM 50 MG/1
50 TABLET ORAL DAILY
Qty: 90 TABLET | Refills: 1 | Status: SHIPPED | OUTPATIENT
Start: 2020-11-02 | End: 2021-03-17

## 2020-11-02 RX ORDER — CLOBETASOL PROPIONATE 0.5 MG/G
CREAM TOPICAL 2 TIMES DAILY
Qty: 45 G | Refills: 1 | Status: SHIPPED | OUTPATIENT
Start: 2020-11-02 | End: 2023-11-01

## 2020-11-02 RX ORDER — METOPROLOL SUCCINATE 25 MG/1
25 TABLET, EXTENDED RELEASE ORAL DAILY
Qty: 90 TABLET | Refills: 1 | Status: SHIPPED | OUTPATIENT
Start: 2020-11-02 | End: 2021-03-30

## 2020-11-02 ASSESSMENT — ANXIETY QUESTIONNAIRES
2. NOT BEING ABLE TO STOP OR CONTROL WORRYING: NOT AT ALL
4. TROUBLE RELAXING: NOT AT ALL
3. WORRYING TOO MUCH ABOUT DIFFERENT THINGS: NOT AT ALL
5. BEING SO RESTLESS THAT IT IS HARD TO SIT STILL: NOT AT ALL
1. FEELING NERVOUS, ANXIOUS, OR ON EDGE: NOT AT ALL
7. FEELING AFRAID AS IF SOMETHING AWFUL MIGHT HAPPEN: NOT AT ALL
GAD7 TOTAL SCORE: 0
6. BECOMING EASILY ANNOYED OR IRRITABLE: NOT AT ALL

## 2020-11-02 ASSESSMENT — PATIENT HEALTH QUESTIONNAIRE - PHQ9: SUM OF ALL RESPONSES TO PHQ QUESTIONS 1-9: 0

## 2020-11-02 ASSESSMENT — MIFFLIN-ST. JEOR: SCORE: 1037.1

## 2020-11-02 ASSESSMENT — PAIN SCALES - GENERAL: PAINLEVEL: NO PAIN (0)

## 2020-11-02 NOTE — NURSING NOTE
Chief Complaint   Patient presents with     Lipids     Hypertension     Headache       Initial /62 (BP Location: Right arm, Patient Position: Chair, Cuff Size: Adult Regular)   Pulse 68   Temp 97.8  F (36.6  C) (Tympanic)   Ht 1.524 m (5')   Wt 58.1 kg (128 lb)   SpO2 98%   BMI 25.00 kg/m   Estimated body mass index is 25 kg/m  as calculated from the following:    Height as of this encounter: 1.524 m (5').    Weight as of this encounter: 58.1 kg (128 lb).  Medication Reconciliation: complete  Karuna Putnam LPN

## 2020-11-02 NOTE — PATIENT INSTRUCTIONS
Assessment & Plan     Benign essential hypertension  - losartan (COZAAR) 50 MG tablet; Take 1 tablet (50 mg) by mouth daily  - metoprolol succinate ER (TOPROL-XL) 25 MG 24 hr tablet; Take 1 tablet (25 mg) by mouth daily  - TSH with free T4 reflex    Lichen sclerosus et atrophicus  - clobetasol (TEMOVATE) 0.05 % external cream; Apply topically 2 times daily Takes for vaginal itching for her LSA per pt.    Migraine with aura and without status migrainosus, not intractable  - Follow-up as needed    Hyperlipidemia LDL goal <100  - Lipid Profile (Chol, Trig, HDL, LDL calc)    Tobacco abuse  - Discussed options    Vitamin D deficiency  - D3 5000 U OTC  - Vitamin D level    Personal history of tobacco use  - Prof fee: Shared Decisionmaking for Lung Cancer Screening  - CT Chest Lung Cancer Scrn Low Dose wo; Future    Encounter for screening mammogram for malignant neoplasm of breast  - MA SCREENING DIGITAL BILATERAL ([place]); Future       Tobacco Cessation:   reports that she has been smoking cigarettes. She has a 60.00 pack-year smoking history. She has never used smokeless tobacco.  Tobacco Cessation Action Plan: Information offered: Patient not interested at this time         See Patient Instructions    Vannessa Anderson CNP  [facility]        Lung Cancer Screening   Frequently Asked Questions  If you are at high-risk for lung cancer, getting screened with low-dose computed tomography (LDCT) every year can help save your life. This handout offers answers to some of the most common questions about lung cancer screening. If you have other questions, please call [phone] ([phone]).     What is it?  Lung cancer screening uses special X-ray technology to create an image of your lung tissue. The exam is quick and easy and takes less than 10 seconds. We don t give you any medicine or use any needles. You can eat before and after the exam. You don t need to change your clothes as long as  the clothing on your chest doesn t contain metal. But, you do need to be able to hold your breath for at least 6 seconds during the exam.    What is the goal of lung cancer screening?  The goal of lung cancer screening is to save lives. Many times, lung cancer is not found until a person starts having physical symptoms. Lung cancer screening can help detect lung cancer in the earliest stages when it may be easier to treat.    Who should be screened for lung cancer?  We suggest lung cancer screening for anyone who is at high-risk for lung cancer. You are in the high-risk group if you:      are between the ages of 55 and 79, and    have smoked at least 1 pack of cigarettes a day for 30 or more years, and    still smoke or have quit within the past 15 years.    However, if you have a new cough or shortness of breath, you should talk to your doctor before being screened.    Some national lung health advocacy groups also recommend screening for people ages 50 to 79 who have smoked an average of 1 pack of cigarettes a day for 20 years. They must also have at least 1 other risk factor for lung cancer, not including exposure to secondhand smoke. Other risk factors are having had cancer in the past, emphysema, pulmonary fibrosis, COPD, a family history of lung cancer, or exposure to certain materials such as arsenic, asbestos, beryllium, cadmium, chromium, diesel fumes, nickel, radon or silica. Your care team can help you know if you have one of these risk factors.     Why does it matter if I have symptoms?  Certain symptoms can be a sign that you have a condition in your lungs that should be checked and treated by your doctor. These symptoms include fever, chest pain, a new or changing cough, shortness of breath that you have never felt before, coughing up blood or unexplained weight loss. Having any of these symptoms can greatly affect the results of lung cancer screening.       Should all smokers get an LDCT lung cancer  screening exam?  It depends. Lung cancer screening is for a very specific group of men and women who have a history of heavy smoking over a long period of time (see  Who should be screened for lung cancer  above).  I am in the high-risk group, but have been diagnosed with cancer in the past. Is LDCT lung cancer screening right for me?  In some cases, you should not have LDCT lung screening, such as when your doctor is already following your cancer with CT scan studies. Your doctor will help you decide if LDCT lung screening is right for you.  Do I need to have a screening exam every year?  Yes. If you are in the high-risk group described earlier, you should get an LDCT lung cancer screening exam every year until you are 79, or are no longer willing or able to undergo screening and possible procedures to diagnose and treat lung cancer.  How effective is LDCT at preventing death from lung cancer?  Studies have shown that LDCT lung cancer screening can lower the risk of death from lung cancer by 20 percent in people who are at high-risk.  What are the risks?  There are some risks and limitations of LDCT lung cancer screening. We want to make sure you understand the risks and benefits, so please let us know if you have any questions. Your doctor may want to talk with you more about these risks.    Radiation exposure: As with any exam that uses radiation, there is a very small increased risk of cancer. The amount of radiation in LDCT is small--about the same amount a person would get from a mammogram. Your doctor orders the exam when he or she feels the potential benefits outweigh the risks.    False negatives: No test is perfect, including LDCT. It is possible that you may have a medical condition, including lung cancer, that is not found during your exam. This is called a false negative result.    False positives and more testing: LDCT very often finds something in the lung that could be cancer, but in fact is not.  This is called a false positive result. False positive tests often cause anxiety. To make sure these findings are not cancer, you may need to have more tests. These tests will be done only if you give us permission. Sometimes patients need a treatment that can have side effects, such as a biopsy. For more information on false positives, see  What can I expect from the results?     Findings not related to lung cancer: Your LDCT exam also takes pictures of areas of your body next to your lungs. In a very small number of cases, the CT scan will show an abnormal finding in one of these areas, such as your kidneys, adrenal glands, liver or thyroid. This finding may not be serious, but you may need more tests. Your doctor can help you decide what other tests you may need, if any.  What can I expect from the results?  About 1 out of 4 LDCT exams will find something that may need more tests. Most of the time, these findings are lung nodules. Lung nodules are very small collections of tissue in the lung. These nodules are very common, and the vast majority--more than 97 percent--are not cancer (benign). Most are normal lymph nodes or small areas of scarring from past infections.  But, if a small lung nodule is found to be cancer, the cancer can be cured more than 90 percent of the time. To know if the nodule is cancer, we may need to get more images before your next yearly screening exam. If the nodule has suspicious features (for example, it is large, has an odd shape or grows over time), we will refer you to a specialist for further testing.  Will my doctor also get the results?  Yes. Your doctor will get a copy of your results.  Is it okay to keep smoking now that there s a cancer screening exam?  No. Tobacco is one of the strongest cancer-causing agents. It causes not only lung cancer, but other cancers and cardiovascular (heart) diseases as well. The damage caused by smoking builds over time. This means that the longer  you smoke, the higher your risk of disease. While it is never too late to quit, the sooner you quit, the better.  Where can I find help to quit smoking?  The best way to prevent lung cancer is to stop smoking. If you have already quit smoking, congratulations and keep it up! For help on quitting smoking, please call [employer] at [phone]-QJUY ([phone]) or the American [employer] Society at 1-256.505.4375 to find local resources near you.  One-on-one health coaching:  If you d prefer to work individually with a health care provider on tobacco cessation, we offer:      Medication Therapy Management:  Our specially trained pharmacists work closely with you and your doctor to help you quit smoking.  Call [phone] or [phone] (toll free).     Can Do: Health coaching offered by [facility].  [email or web address]

## 2020-11-02 NOTE — LETTER
November 3, 2020      Rita E Marnellie  [address] [facility] [address]        Dear ,    We are writing to inform you of your test results.    Your test results fall within the expected range(s) or remain unchanged from previous results.  Please continue with current treatment plan.    Resulted Orders   Lipid Profile (Chol, Trig, HDL, LDL calc)   Result Value Ref Range    Cholesterol 204 (H) <200 mg/dL      Comment:      Desirable:       <200 mg/dl    Triglycerides 153 (H) <150 mg/dL      Comment:      Borderline high:  150-199 mg/dl  High:             200-499 mg/dl  Very high:       >499 mg/dl  Non Fasting      HDL Cholesterol 55 >49 mg/dL    LDL Cholesterol Calculated 118 (H) <100 mg/dL      Comment:      Above desirable:  100-129 mg/dl  Borderline High:  130-159 mg/dL  High:             160-189 mg/dL  Very high:       >189 mg/dl      Non HDL Cholesterol 149 (H) <130 mg/dL      Comment:      Above Desirable:  130-159 mg/dl  Borderline high:  160-189 mg/dl  High:             190-219 mg/dl  Very high:       >219 mg/dl     TSH with free T4 reflex   Result Value Ref Range    TSH 1.44 0.40 - 4.00 mU/L       If you have any questions or concerns, please call the clinic at the number listed above.       Sincerely,        Vannessa Anderson, CNP

## 2020-11-02 NOTE — RESULT ENCOUNTER NOTE
Normal labs other than LIpids  Pls recommend low dose statin as lipids are higher  Lipitor 20 mg daily (let me know if she is willing to try this)    D level pending    The 10-year ASCVD risk score (Ema ORELLANA Jr., et al., 2013) is: 13.6%    Values used to calculate the score:      Age: 65 years      Sex: Female      Is Non- : No      Diabetic: No      Tobacco smoker: Yes      Systolic Blood Pressure: 118 mmHg      Is BP treated: Yes      HDL Cholesterol: 55 mg/dL      Total Cholesterol: 204 mg/dL    [facility]  [phone]

## 2020-11-03 ENCOUNTER — HOSPITAL ENCOUNTER (OUTPATIENT)
Dept: CT IMAGING | Facility: HOSPITAL | Age: 65
End: 2020-11-03
Attending: NURSE PRACTITIONER
Payer: MEDICARE

## 2020-11-03 ENCOUNTER — ANCILLARY PROCEDURE (OUTPATIENT)
Dept: MAMMOGRAPHY | Facility: OTHER | Age: 65
End: 2020-11-03
Attending: NURSE PRACTITIONER
Payer: MEDICARE

## 2020-11-03 DIAGNOSIS — Z12.31 ENCOUNTER FOR SCREENING MAMMOGRAM FOR MALIGNANT NEOPLASM OF BREAST: ICD-10-CM

## 2020-11-03 DIAGNOSIS — Z87.891 PERSONAL HISTORY OF TOBACCO USE: ICD-10-CM

## 2020-11-03 PROCEDURE — 77067 SCR MAMMO BI INCL CAD: CPT | Mod: TC

## 2020-11-03 PROCEDURE — G0297 LDCT FOR LUNG CA SCREEN: HCPCS

## 2020-11-03 ASSESSMENT — ANXIETY QUESTIONNAIRES: GAD7 TOTAL SCORE: 0

## 2020-11-03 NOTE — RESULT ENCOUNTER NOTE
Impression:   1. ACR Assessment Category:  Lung-RADS Category 3. Probably benign  finding(s) .      Recommendation:  Lung-RADS Category 3. Probably benign finding(s)-  short term follow up suggested 6 month low dose CT Chest without  contrast (please order exam code IMG 2163). .         2. Significant Incidental Finding(s):  Category S: No.  a.  Numerous new lung nodules throughout both lungs, the majority of  which are groundglass in appearance suggesting the possibility of  infection, hypersensitivity pneumonitis, alveolar disease, atypical  infections and possibly sarcoidosis.   b.  c.  d.  e.  3. Prior history of Lung cancer: .  4. (Any moderate or severe Emphysema or bronchial wall thickening or  mosaic attenuation?)  5. Avoidance of tobacco smoke is strongly advised. Please consider  referral for smoking cessation to [facility] Medication Therapy Management  (MTM) if clinically appropriate.      Needs 6 month Follow-up       Vannessa MAYERSROBBY  [phone]

## 2020-11-03 NOTE — LETTER
2020      Rita Fournier  [address] [facility] [address]        Dear ,    We are writing to inform you of your test results.    Your test results fall within the expected range(s) or remain unchanged from previous results.  Please continue with current treatment plan.    Resulted Orders   CT Chest Lung Cancer Scrn Low Dose wo    Narrative    CT Low Dose Lung Cancer Screening    History: Screening for lung cancer, smoking.    Number of packs-year of smokin  Current or Former smoker?: Current  if former, number of years since quit?:    Comparison: 10/11/2019    Technique: Helical acquisition Low dose CT chest. Images reviewed in  lung, soft tissue and bone windows.  DLP: 92 (mGy*cm)  CTDIvol: 2.74 (mGy)    Findings: There are now numerous new nodules seen throughout the  parenchyma of both lungs including the followin.  2.7 mm mixed solid and groundglass nodule located peripherally in  the left upper lobe image #8 of series 3.  2.  2.6 mm solid appearing nodule located centrally in the right upper  lobe image #8 of series 3.  3.  2.7 mm groundglass nodule located peripherally in the left upper  lobe image 14 of series 3.  4.  3.1 mm groundglass nodule located centrally along the pleura of  the right lung image 18 of series 3.  5.  3.1 mm groundglass nodule located posteriorly in the left upper  lobe image 19 of series 3.  6.  3.8 mm mixed solid and groundglass nodule along the major fissure  of the right lung image #25 of series 3.  7.  2.4 mm subpleural groundglass nodule posteriorly left lower lobe  image 30 series 3.    Emphysema: Mild/moderate centrilobular emphysema with a predominance  in the upper lobes.    Mosaic attenuation: No    Bronchial wall thickening: Mild bronchial wall thickening again seen  within the lingula and right middle lobe..    Bronchiectasis: None    Coronary artery calcium: Present and not significantly changed.    Other portions of the chest:  "Normal Lymph nodes:    Upper abdomen: Unremarkable     Bones: No acute abnormality. Mild degenerative changes are again seen  within the thoracic spine.      Impression    Impression:   1. ACR Assessment Category:  Lung-RADS Category 3. Probably benign  finding(s) .     Recommendation:  Lung-RADS Category 3. Probably benign finding(s)-  short term follow up suggested 6 month low dose CT Chest without  contrast (please order exam code IMG 2163). .       2. Significant Incidental Finding(s):  Category S: No.  a.  Numerous new lung nodules throughout both lungs, the majority of  which are groundglass in appearance suggesting the possibility of  infection, hypersensitivity pneumonitis, alveolar disease, atypical  infections and possibly sarcoidosis.   b.  c.  d.  e.  3. Prior history of Lung cancer: .  4. (Any moderate or severe Emphysema or bronchial wall thickening or  mosaic attenuation?)  5. Avoidance of tobacco smoke is strongly advised. Please consider  referral for smoking cessation to [facility] Medication Therapy Management  (MTM) if clinically appropriate.      Download the \"Lung RADS Assessment Categories\" table at this site:   http://[email or web address].org/Quality-Safety/Resources/LungRADS    ROCHELLE ESTRELLA MD       If you have any questions or concerns, please call the clinic at the number listed above.       Sincerely,        [facility]                "

## 2020-11-03 NOTE — LETTER
2020      Rita Fournier  [address] [facility] [address]        Dear ,    We are writing to inform you of your test results.    Your test results fall within the expected range(s) or remain unchanged from previous results.  Please continue with current treatment plan.    Resulted Orders   CT Chest Lung Cancer Scrn Low Dose wo    Narrative    CT Low Dose Lung Cancer Screening    History: Screening for lung cancer, smoking.    Number of packs-year of smokin  Current or Former smoker?: Current  if former, number of years since quit?:    Comparison: 10/11/2019    Technique: Helical acquisition Low dose CT chest. Images reviewed in  lung, soft tissue and bone windows.  DLP: 92 (mGy*cm)  CTDIvol: 2.74 (mGy)    Findings: There are now numerous new nodules seen throughout the  parenchyma of both lungs including the followin.  2.7 mm mixed solid and groundglass nodule located peripherally in  the left upper lobe image #8 of series 3.  2.  2.6 mm solid appearing nodule located centrally in the right upper  lobe image #8 of series 3.  3.  2.7 mm groundglass nodule located peripherally in the left upper  lobe image 14 of series 3.  4.  3.1 mm groundglass nodule located centrally along the pleura of  the right lung image 18 of series 3.  5.  3.1 mm groundglass nodule located posteriorly in the left upper  lobe image 19 of series 3.  6.  3.8 mm mixed solid and groundglass nodule along the major fissure  of the right lung image #25 of series 3.  7.  2.4 mm subpleural groundglass nodule posteriorly left lower lobe  image 30 series 3.    Emphysema: Mild/moderate centrilobular emphysema with a predominance  in the upper lobes.    Mosaic attenuation: No    Bronchial wall thickening: Mild bronchial wall thickening again seen  within the lingula and right middle lobe..    Bronchiectasis: None    Coronary artery calcium: Present and not significantly changed.    Other portions of the chest:  "Normal Lymph nodes:    Upper abdomen: Unremarkable     Bones: No acute abnormality. Mild degenerative changes are again seen  within the thoracic spine.      Impression    Impression:   1. ACR Assessment Category:  Lung-RADS Category 3. Probably benign  finding(s) .     Recommendation:  Lung-RADS Category 3. Probably benign finding(s)-  short term follow up suggested 6 month low dose CT Chest without  contrast (please order exam code IMG 2163). .       2. Significant Incidental Finding(s):  Category S: No.  a.  Numerous new lung nodules throughout both lungs, the majority of  which are groundglass in appearance suggesting the possibility of  infection, hypersensitivity pneumonitis, alveolar disease, atypical  infections and possibly sarcoidosis.   b.  c.  d.  e.  3. Prior history of Lung cancer: .  4. (Any moderate or severe Emphysema or bronchial wall thickening or  mosaic attenuation?)  5. Avoidance of tobacco smoke is strongly advised. Please consider  referral for smoking cessation to [facility] Medication Therapy Management  (MTM) if clinically appropriate.      Download the \"Lung RADS Assessment Categories\" table at this site:   http://www.acr.org/Quality-Safety/Resources/LungRADS    ROCHELLE ESTRELLA MD       If you have any questions or concerns, please call the clinic at the number listed above.       Sincerely,        [facility]                "

## 2020-11-04 ENCOUNTER — TELEPHONE (OUTPATIENT)
Dept: FAMILY MEDICINE | Facility: OTHER | Age: 65
End: 2020-11-04

## 2020-11-04 DIAGNOSIS — Z87.891 PERSONAL HISTORY OF TOBACCO USE: Primary | ICD-10-CM

## 2020-11-04 DIAGNOSIS — R91.8 PULMONARY NODULES: Primary | ICD-10-CM

## 2020-11-04 LAB — DEPRECATED CALCIDIOL+CALCIFEROL SERPL-MC: 19 UG/L (ref 20–75)

## 2020-11-04 NOTE — PATIENT INSTRUCTIONS

## 2020-11-06 DIAGNOSIS — E55.9 VITAMIN D DEFICIENCY: Primary | ICD-10-CM

## 2020-11-06 RX ORDER — ERGOCALCIFEROL 1.25 MG/1
CAPSULE, LIQUID FILLED ORAL
Qty: 36 CAPSULE | Refills: 0 | Status: SHIPPED | OUTPATIENT
Start: 2020-11-06 | End: 2021-03-30

## 2020-11-06 NOTE — RESULT ENCOUNTER NOTE
Very low D level  High dose D sent to pharmacy - 50,000 U 3 times weekly for 3 mos then DC  Also, D3 5000 U OTC daily and ongoing    [facility]  [phone]

## 2020-12-04 NOTE — TELEPHONE ENCOUNTER
Corrected zyrtec prescription for child Pt rescheduled for mammogram and ultrasound on Fri, 6-9th at 9:30 for mammo, U/S to follow.  Pt notified.

## 2021-03-17 DIAGNOSIS — I10 BENIGN ESSENTIAL HYPERTENSION: ICD-10-CM

## 2021-03-17 RX ORDER — LOSARTAN POTASSIUM 50 MG/1
50 TABLET ORAL DAILY
Qty: 30 TABLET | Refills: 0 | Status: SHIPPED | OUTPATIENT
Start: 2021-03-17 | End: 2021-03-30

## 2021-03-17 NOTE — TELEPHONE ENCOUNTER
Cozaar       Last Written Prescription Date:  11/2/2020  Last Fill Quantity: 90,   # refills: 1  Last Office Visit: 7/16/2020  Future Office visit:

## 2021-03-18 NOTE — PROGRESS NOTES
Assessment & Plan       Hyperlipidemia LDL goal <100  - Lipid Profile (Chol, Trig, HDL, LDL calc)  - Comprehensive metabolic panel    Benign essential hypertension  - Comprehensive metabolic panel  - TSH with free T4 reflex  - CBC with platelets differential  - losartan (COZAAR) 50 MG tablet; Take 1 tablet (50 mg) by mouth daily  - metoprolol succinate ER (TOPROL-XL) 25 MG 24 hr tablet; Take 1 tablet (25 mg) by mouth daily    Migraine with aura and without status migrainosus, not intractable  - Follow-up as needed    Vitamin D deficiency  - Vitamin D level  - vitamin D3 (CHOLECALCIFEROL) 50 mcg (2000 units) tablet; Take 1 tablet (50 mcg) by mouth daily    Special screening for malignant neoplasms, colon  - COLOGUARD([employer])    Microscopic hematuria--6/2020, 7/2020  - UA reflex to Microscopic and Culture - [facility]    Pulmonary nodules  - CT Chest w Contrast; Future    Urinary hesitancy  - tamsulosin (FLOMAX) 0.4 MG capsule; Take 1 capsule (0.4 mg) by mouth daily for 14 days      Return in about 6 months (around 9/30/2021) for Chronic disease management, am fasting.      Vannessa Anderson, LIZABETH  [facility] - [place] is a 65 year old who presents for the following health issues       Hyperlipidemia Follow-Up    Are you regularly taking any medication or supplement to lower your cholesterol?   No    Are you having muscle aches or other side effects that you think could be caused by your cholesterol lowering medication?  No      Hypertension Follow-up    Do you check your blood pressure regularly outside of the clinic? No     Are you following a low salt diet? Yes    Are your blood pressures ever more than 140 on the top number (systolic) OR more   than 90 on the bottom number (diastolic), for example 140/90? Yes        How many servings of fruits and vegetables do you eat daily?  2-3    On average, how many sweetened beverages do you drink each day (Examples: soda, juice, sweet  tea, etc.  Do NOT count diet or artificially sweetened beverages)?   0    How many days per week do you exercise enough to make your heart beat faster? 3 or less    How many minutes a day do you exercise enough to make your heart beat faster? 10 - 19    How many days per week do you miss taking your medication? 0      Vitamin D deficiency - lab due  2000mg of Vitamin D daily  Notice difference when does not take it      Migraine     Since your last clinic visit, how have your headaches changed?  No change    How often are you getting headaches or migraines? Every 10 days/couple weeks, varies    Are you able to do normal daily activities when you have a migraine? No    Are you taking rescue/relief medications? (Select all that apply) ibuprofen (Advil, Motrin) and Tylenol    How helpful is your rescue/relief medication?  I get some relief    Are you taking any medications to prevent migraines? (Select all that apply)  No    In the past 4 weeks, how often have you gone to urgent care or the emergency room because of your headaches?  0    Same amount of headaches  Endorses aura  Come on randomly  Follows with eye doctor      UTI symptoms - history of UTI  Frequency, pressure to go then dribble  Up going every night  Started few days ago  No cloudiness or blood in urine      CT Low Dose Lung Cancer Screening     History: Screening for lung cancer, smoking.     Number of packs-year of smokin  Current or Former smoker?: Current  if former, number of years since quit?:     Comparison: 10/11/2019     Technique: Helical acquisition Low dose CT chest. Images reviewed in  lung, soft tissue and bone windows.  DLP: 92 (mGy*cm)  CTDIvol: 2.74 (mGy)     Findings: There are now numerous new nodules seen throughout the  parenchyma of both lungs including the followin.  2.7 mm mixed solid and groundglass nodule located peripherally in  the left upper lobe image #8 of series 3.  2.  2.6 mm solid appearing nodule located  centrally in the right upper  lobe image #8 of series 3.  3.  2.7 mm groundglass nodule located peripherally in the left upper  lobe image 14 of series 3.  4.  3.1 mm groundglass nodule located centrally along the pleura of  the right lung image 18 of series 3.  5.  3.1 mm groundglass nodule located posteriorly in the left upper  lobe image 19 of series 3.  6.  3.8 mm mixed solid and groundglass nodule along the major fissure  of the right lung image #25 of series 3.  7.  2.4 mm subpleural groundglass nodule posteriorly left lower lobe  image 30 series 3.     Emphysema: Mild/moderate centrilobular emphysema with a predominance  in the upper lobes.     Mosaic attenuation: No     Bronchial wall thickening: Mild bronchial wall thickening again seen  within the lingula and right middle lobe..     Bronchiectasis: None     Coronary artery calcium: Present and not significantly changed.     Other portions of the chest: Normal Lymph nodes:     Upper abdomen: Unremarkable      Bones: No acute abnormality. Mild degenerative changes are again seen  within the thoracic spine.                                                                      Impression:   1. ACR Assessment Category:  Lung-RADS Category 3. Probably benign  finding(s) .      Recommendation:  Lung-RADS Category 3. Probably benign finding(s)-  short term follow up suggested 6 month low dose CT Chest without  contrast (please order exam code IMG 2163). .         2. Significant Incidental Finding(s):  Category S: No.  a.  Numerous new lung nodules throughout both lungs, the majority of  which are groundglass in appearance suggesting the possibility of  infection, hypersensitivity pneumonitis, alveolar disease, atypical  infections and possibly sarcoidosis.   b.  c.  d.  e.  3. Prior history of Lung cancer: .  4. (Any moderate or severe Emphysema or bronchial wall thickening or  mosaic attenuation?)  5. Avoidance of tobacco smoke is strongly advised. Please  "consider  referral for smoking cessation to [facility]  (MTM) if clinically appropriate.     Download the \"Lung RADS Assessment Categories\" table at this site:   http://www.acr.org/Quality-Safety/Resources/LungRADS     ROCHELLE ESTRELLA MD        EXAM: MA SCREENING BILATERAL W/ LATRICE, 11/3/2020 1:45 PM     COMPARISONS: October 11, 2019     HISTORY: annual due; Encounter for screening mammogram for malignant  neoplasm of breast     FINDINGS: No new dominant masses or malignant calcifications are noted     BREAST DENSITY: Heterogeneously dense.                                                                      IMPRESSION: BI-RADS CATEGORY: 2 - Benign.     RECOMMENDED FOLLOW-UP: Annual Mammography.        The images were reviewed by R2 computer aided detection.     DENNIS LONDON MD        PHQ 9/18/2018 10/4/2019 11/2/2020   PHQ-9 Total Score 0 0 0   Q9: Thoughts of better off dead/self-harm past 2 weeks Not at all Not at all Not at all       ELIZABETH-7 SCORE 9/18/2018 10/4/2019 11/2/2020   Total Score 0 0 0         Patient Active Problem List   Diagnosis     Dermatitis     Alopecia     Vitamin D deficiency     Benign essential hypertension     Hyperlipidemia LDL goal <100     Migraine with aura and without status migrainosus, not intractable     CNH (chondrodermatitis nodularis helicis)     Lichen sclerosus et atrophicus--VULVA 2012, Clobetasol, exam--7/21/2020     Other atopic dermatitis and related conditions     Microscopic hematuria--6/2020, 7/2020     Other emphysema (H)     Past Surgical History:   Procedure Laterality Date     BIOPSY Right 06/15/2017    US guided right breast needle biopsy     BIOPSY BREAST Bilateral 7/17/2017    Procedure: BIOPSY BREAST;  EXCISIONAL BIOPSY RIGHT BREAST MASS, EXCISION LEFT BREAST SKIN TAG;  Surgeon: Tianna Hanley MD;  Location: [facility]     BLADDER SURGERY  1981    \"bladder tacked up\"     C ORAL SURGERY PROCEDURE  08/23/2018    Had rods put in to prep for " "implants      CHOLECYSTECTOMY  1975    Cholelithiasis     COLONOSCOPY N/A 1/12/2015    Procedure: COLONOSCOPY;  Surgeon: Tianna Hanley MD;  Location: [facility] OR     facial redisection gland removal      LEFT > infected neck gland     HYSTERECTOMY TOTAL ABDOMINAL N/A 01/01/1981       Social History     Tobacco Use     Smoking status: Current Every Day Smoker     Packs/day: 2.00     Years: 30.00     Pack years: 60.00     Types: Cigarettes     Smokeless tobacco: Never Used     Tobacco comment: Tried to Quit (YES); Longest Tobacco Free (\"Cutdown\")   Substance Use Topics     Alcohol use: Yes     Comment: OCCASIONALLY     Family History   Problem Relation Age of Onset     Cancer Brother         Liver     Colon Cancer Brother      Cancer Brother         Pancreatic     Cancer Other         Family Hx     Diabetes Father      Cancer Father         Stomach     Heart Disease Father         Heart Disease     Heart Disease Mother         Heart Disease     Cancer Mother 73        Liver     Cancer Sister 57        Pancreatic - Cause of Death           Current Outpatient Medications   Medication Sig Dispense Refill     Cyanocobalamin (B-12) 1000 MCG TBCR        losartan (COZAAR) 50 MG tablet Take 1 tablet (50 mg) by mouth daily 30 tablet 0     metoprolol succinate ER (TOPROL-XL) 25 MG 24 hr tablet Take 1 tablet (25 mg) by mouth daily 90 tablet 1     clobetasol (TEMOVATE) 0.05 % external cream Apply topically 2 times daily Takes for vaginal itching for her LSA per pt. (Patient not taking: Reported on 3/30/2021) 45 g 1     Lactobacillus (PROBIOTIC ACIDOPHILUS PO)            Allergies   Allergen Reactions     Bacitracin      Codeine Nausea and Vomiting     cramping     Doxepin Unknown     face swelling     No Clinical Screening - See Comments Other (See Comments)     face swelling     Penicillins Hives     Provider wants to try Ancef(7/17/17).  Hives developed within 45 minutes.     Simvastatin Hives         Recent Labs   Lab Test " 11/02/20  1353 06/14/20  1846 01/26/20  1237 10/04/19  1353 09/19/18  0936   *  --   --  138* 164*   HDL 55  --   --  49* 47*   TRIG 153*  --   --  105 121   ALT  --  28 23 21 26   CR  --  0.54 0.57 0.54 0.60   GFRESTIMATED  --  >90 >90 >90 >90   GFRESTBLACK  --  >90 >90 >90 >90   POTASSIUM  --  3.8 4.3 4.4 4.2   TSH 1.44  --   --  0.96  --         BP Readings from Last 3 Encounters:   03/30/21 128/70   11/02/20 118/62   08/19/20 122/62    Wt Readings from Last 3 Encounters:   03/30/21 59 kg (130 lb)   11/02/20 58.1 kg (128 lb)   07/21/20 58.1 kg (128 lb)              Review of Systems   CONSTITUTIONAL: NEGATIVE for fever, chills, change in weight  EYES: NEGATIVE for vision changes or irritation  ENT/MOUTH: NEGATIVE for ear, mouth and throat problems  RESP: NEGATIVE for significant cough or SOB  CV: NEGATIVE for chest pain, palpitations or peripheral edema  GI: NEGATIVE for nausea, abdominal pain, heartburn, or change in bowel habits   female: decreased urinary stream  MUSCULOSKELETAL: NEGATIVE for significant arthralgias or myalgia  NEURO: NEGATIVE for weakness, dizziness or paresthesias  PSYCHIATRIC: NEGATIVE for changes in mood or affect          Objective    /70 (BP Location: Right arm, Patient Position: Chair, Cuff Size: Adult Regular)   Pulse 61   Temp 96  F (35.6  C) (Tympanic)   Resp 18   Ht 1.524 m (5')   Wt 59 kg (130 lb)   SpO2 98%   BMI 25.39 kg/m    Body mass index is 25.39 kg/m .         Physical Exam   GENERAL: healthy, alert and no distress  EYES: Eyes grossly normal to inspection, PERRL and conjunctivae and sclerae normal  HENT: ear canals and TM's normal, nose and mouth without ulcers or lesions  NECK: no adenopathy, no asymmetry, masses, or scars and thyroid normal to palpation  RESP: clear but diminished lung sounds  CV: regular rate and rhythm, normal S1 S2, no S3 or S4, no murmur, click or rub, no peripheral edema and peripheral pulses strong  MS: no gross musculoskeletal  defects noted, no edema  SKIN: no suspicious lesions or rashes  PSYCH: mentation appears normal, affect normal/bright

## 2021-03-30 ENCOUNTER — OFFICE VISIT (OUTPATIENT)
Dept: FAMILY MEDICINE | Facility: OTHER | Age: 66
End: 2021-03-30
Attending: NURSE PRACTITIONER
Payer: MEDICARE

## 2021-03-30 VITALS
DIASTOLIC BLOOD PRESSURE: 70 MMHG | WEIGHT: 130 LBS | TEMPERATURE: 96 F | SYSTOLIC BLOOD PRESSURE: 128 MMHG | HEART RATE: 61 BPM | BODY MASS INDEX: 25.52 KG/M2 | OXYGEN SATURATION: 98 % | RESPIRATION RATE: 18 BRPM | HEIGHT: 60 IN

## 2021-03-30 DIAGNOSIS — G43.109 MIGRAINE WITH AURA AND WITHOUT STATUS MIGRAINOSUS, NOT INTRACTABLE: ICD-10-CM

## 2021-03-30 DIAGNOSIS — I10 BENIGN ESSENTIAL HYPERTENSION: ICD-10-CM

## 2021-03-30 DIAGNOSIS — Z12.11 SPECIAL SCREENING FOR MALIGNANT NEOPLASMS, COLON: ICD-10-CM

## 2021-03-30 DIAGNOSIS — R31.29 MICROSCOPIC HEMATURIA: ICD-10-CM

## 2021-03-30 DIAGNOSIS — E55.9 VITAMIN D DEFICIENCY: ICD-10-CM

## 2021-03-30 DIAGNOSIS — E78.5 HYPERLIPIDEMIA LDL GOAL <100: Primary | ICD-10-CM

## 2021-03-30 DIAGNOSIS — R39.11 URINARY HESITANCY: ICD-10-CM

## 2021-03-30 DIAGNOSIS — R91.8 PULMONARY NODULES: ICD-10-CM

## 2021-03-30 PROBLEM — J43.8 OTHER EMPHYSEMA (H): Status: RESOLVED | Noted: 2021-03-30 | Resolved: 2021-03-30

## 2021-03-30 PROBLEM — J43.8 OTHER EMPHYSEMA (H): Status: ACTIVE | Noted: 2021-03-30

## 2021-03-30 LAB
ALBUMIN SERPL-MCNC: 3.9 G/DL (ref 3.4–5)
ALBUMIN UR-MCNC: NEGATIVE MG/DL
ALP SERPL-CCNC: 69 U/L (ref 40–150)
ALT SERPL W P-5'-P-CCNC: 24 U/L (ref 0–50)
ANION GAP SERPL CALCULATED.3IONS-SCNC: 7 MMOL/L (ref 3–14)
APPEARANCE UR: CLEAR
AST SERPL W P-5'-P-CCNC: 8 U/L (ref 0–45)
BASOPHILS # BLD AUTO: 0.1 10E9/L (ref 0–0.2)
BASOPHILS NFR BLD AUTO: 1 %
BILIRUB SERPL-MCNC: 0.6 MG/DL (ref 0.2–1.3)
BILIRUB UR QL STRIP: NEGATIVE
BUN SERPL-MCNC: 13 MG/DL (ref 7–30)
CALCIUM SERPL-MCNC: 9.4 MG/DL (ref 8.5–10.1)
CHLORIDE SERPL-SCNC: 103 MMOL/L (ref 94–109)
CHOLEST SERPL-MCNC: 250 MG/DL
CO2 SERPL-SCNC: 26 MMOL/L (ref 20–32)
COLOR UR AUTO: YELLOW
CREAT SERPL-MCNC: 0.48 MG/DL (ref 0.52–1.04)
DIFFERENTIAL METHOD BLD: ABNORMAL
EOSINOPHIL # BLD AUTO: 0.1 10E9/L (ref 0–0.7)
EOSINOPHIL NFR BLD AUTO: 2.3 %
ERYTHROCYTE [DISTWIDTH] IN BLOOD BY AUTOMATED COUNT: 12.8 % (ref 10–15)
GFR SERPL CREATININE-BSD FRML MDRD: >90 ML/MIN/{1.73_M2}
GLUCOSE SERPL-MCNC: 89 MG/DL (ref 70–99)
GLUCOSE UR STRIP-MCNC: NEGATIVE MG/DL
HCT VFR BLD AUTO: 42.5 % (ref 35–47)
HDLC SERPL-MCNC: 53 MG/DL
HGB BLD-MCNC: 14.9 G/DL (ref 11.7–15.7)
HGB UR QL STRIP: NEGATIVE
KETONES UR STRIP-MCNC: NEGATIVE MG/DL
LDLC SERPL CALC-MCNC: 163 MG/DL
LEUKOCYTE ESTERASE UR QL STRIP: NEGATIVE
LYMPHOCYTES # BLD AUTO: 1.1 10E9/L (ref 0.8–5.3)
LYMPHOCYTES NFR BLD AUTO: 18.2 %
MCH RBC QN AUTO: 35.1 PG (ref 26.5–33)
MCHC RBC AUTO-ENTMCNC: 35.1 G/DL (ref 31.5–36.5)
MCV RBC AUTO: 100 FL (ref 78–100)
MONOCYTES # BLD AUTO: 0.6 10E9/L (ref 0–1.3)
MONOCYTES NFR BLD AUTO: 9.9 %
NEUTROPHILS # BLD AUTO: 4.2 10E9/L (ref 1.6–8.3)
NEUTROPHILS NFR BLD AUTO: 68.6 %
NITRATE UR QL: NEGATIVE
NONHDLC SERPL-MCNC: 197 MG/DL
PH UR STRIP: 5.5 PH (ref 5–7)
PLATELET # BLD AUTO: 210 10E9/L (ref 150–450)
POTASSIUM SERPL-SCNC: 4.1 MMOL/L (ref 3.4–5.3)
PROT SERPL-MCNC: 7.7 G/DL (ref 6.8–8.8)
RBC # BLD AUTO: 4.25 10E12/L (ref 3.8–5.2)
SODIUM SERPL-SCNC: 136 MMOL/L (ref 133–144)
SOURCE: NORMAL
SP GR UR STRIP: 1.01 (ref 1–1.03)
TRIGL SERPL-MCNC: 171 MG/DL
TSH SERPL DL<=0.005 MIU/L-ACNC: 1.96 MU/L (ref 0.4–4)
UROBILINOGEN UR STRIP-ACNC: 0.2 EU/DL (ref 0.2–1)
WBC # BLD AUTO: 6.1 10E9/L (ref 4–11)

## 2021-03-30 PROCEDURE — 82306 VITAMIN D 25 HYDROXY: CPT | Mod: ZL | Performed by: NURSE PRACTITIONER

## 2021-03-30 PROCEDURE — 85025 COMPLETE CBC W/AUTO DIFF WBC: CPT | Mod: ZL | Performed by: NURSE PRACTITIONER

## 2021-03-30 PROCEDURE — 80061 LIPID PANEL: CPT | Mod: ZL | Performed by: NURSE PRACTITIONER

## 2021-03-30 PROCEDURE — G0463 HOSPITAL OUTPT CLINIC VISIT: HCPCS

## 2021-03-30 PROCEDURE — 84443 ASSAY THYROID STIM HORMONE: CPT | Mod: ZL | Performed by: NURSE PRACTITIONER

## 2021-03-30 PROCEDURE — 81003 URINALYSIS AUTO W/O SCOPE: CPT | Mod: ZL | Performed by: NURSE PRACTITIONER

## 2021-03-30 PROCEDURE — 80053 COMPREHEN METABOLIC PANEL: CPT | Mod: ZL | Performed by: NURSE PRACTITIONER

## 2021-03-30 PROCEDURE — 36415 COLL VENOUS BLD VENIPUNCTURE: CPT | Mod: ZL | Performed by: NURSE PRACTITIONER

## 2021-03-30 PROCEDURE — 99214 OFFICE O/P EST MOD 30 MIN: CPT | Performed by: NURSE PRACTITIONER

## 2021-03-30 RX ORDER — TAMSULOSIN HYDROCHLORIDE 0.4 MG/1
0.4 CAPSULE ORAL DAILY
Qty: 14 CAPSULE | Refills: 0 | Status: SHIPPED | OUTPATIENT
Start: 2021-03-30 | End: 2021-04-13

## 2021-03-30 RX ORDER — LOSARTAN POTASSIUM 50 MG/1
50 TABLET ORAL DAILY
Qty: 90 TABLET | Refills: 1 | Status: SHIPPED | OUTPATIENT
Start: 2021-03-30 | End: 2021-10-01

## 2021-03-30 RX ORDER — CHOLECALCIFEROL (VITAMIN D3) 50 MCG
1 TABLET ORAL DAILY
Qty: 90 TABLET | Refills: 3 | COMMUNITY
Start: 2021-03-30 | End: 2021-10-01

## 2021-03-30 RX ORDER — METOPROLOL SUCCINATE 25 MG/1
25 TABLET, EXTENDED RELEASE ORAL DAILY
Qty: 90 TABLET | Refills: 1 | Status: SHIPPED | OUTPATIENT
Start: 2021-03-30 | End: 2021-10-01

## 2021-03-30 ASSESSMENT — PAIN SCALES - GENERAL: PAINLEVEL: NO PAIN (0)

## 2021-03-30 ASSESSMENT — MIFFLIN-ST. JEOR: SCORE: 1046.18

## 2021-03-30 NOTE — PATIENT INSTRUCTIONS
Assessment & Plan       Hyperlipidemia LDL goal <100  - Lipid Profile (Chol, Trig, HDL, LDL calc)  - Comprehensive metabolic panel    Benign essential hypertension  - Comprehensive metabolic panel  - TSH with free T4 reflex  - CBC with platelets differential  - losartan (COZAAR) 50 MG tablet; Take 1 tablet (50 mg) by mouth daily  - metoprolol succinate ER (TOPROL-XL) 25 MG 24 hr tablet; Take 1 tablet (25 mg) by mouth daily    Migraine with aura and without status migrainosus, not intractable  - Follow-up as needed    Vitamin D deficiency  - Vitamin D level  - vitamin D3 (CHOLECALCIFEROL) 50 mcg (2000 units) tablet; Take 1 tablet (50 mcg) by mouth daily    Special screening for malignant neoplasms, colon  - COLOGUARD([employer])    Microscopic hematuria--6/2020, 7/2020  - UA reflex to Microscopic and Culture - [facility]    Pulmonary nodules  - CT Chest w Contrast; Future    Urinary hesitancy  - tamsulosin (FLOMAX) 0.4 MG capsule; Take 1 capsule (0.4 mg) by mouth daily for 14 days      Return in about 6 months (around 9/30/2021) for Chronic disease management, am fasting.      Vannessa Anderson, LIZABETH  [facility] [place] [facility]

## 2021-03-30 NOTE — NURSING NOTE
Chief Complaint   Patient presents with     Lipids     Hypertension       Initial /70 (BP Location: Right arm, Patient Position: Chair, Cuff Size: Adult Regular)   Pulse 61   Temp 96  F (35.6  C) (Tympanic)   Resp 18   Ht 1.524 m (5')   Wt 59 kg (130 lb)   SpO2 98%   BMI 25.39 kg/m   Estimated body mass index is 25.39 kg/m  as calculated from the following:    Height as of this encounter: 1.524 m (5').    Weight as of this encounter: 59 kg (130 lb).  Medication Reconciliation: complete  Karuna Putnam LPN

## 2021-03-30 NOTE — LETTER
My Migraine Action Plan      Date: 3/30/2021     My Name: Rita Fournier   YOB: 1955  My Pharmacy: [id number] - [place], [place] - [address]  AT [facility] [address]     My Preventive Medicine(s):  Nne  My Rescue Medicine(s):  Tylenol and or Ibuprofen My Doctor: Vannessa Anderson     My Clinic: [facility]  [address] [place] [address]  [facility] [id number]  [phone]        GREEN ZONE = Good Control    My headache plan is working.   I can do what I need to do.         I WILL:     ? Keep managing my triggers.  ? Write in my migraine diary each time I have a headache.  ? Keep taking my preventive medicine daily.  ? Take my rescue medicine as needed.             YELLOW ZONE = Not Enough Control    My headache plan isn t always working.   My headaches keep me from doing   some of the things I need to do.   I WILL:     ? Set goals to control my triggers and act on them.  ? Write in my migraine diary each time I have a headache and review it for                     patterns or new triggers.  ? Keep taking my preventive medicine daily.  ? Take my rescue medicine as needed.  l Make sure I stay hydrated.  ? Call my provider or clinic if my rescue medication did not help after taking it on             at least two separate headache days  ? Call my provider or clinic if I need to use my rescue medicine more than an                  average of 2 times per week over the course of one month.               RED ZONE = Poor or No Control    My headache plan has  failed. I can t do anything  when I have one. My  medicines aren t working.   I WILL:   ? Keep taking my preventive medicine daily.  ? Make sure I stay hydrated.  ? Call my provider or clinic if my medicines are not helping or if I am not able to              keep fluids down because of nausea.  ? Let my provider or clinic know within 2 weeks if I have gone to an urgent care or          emergency department.           Provider specific instructions:      Do not take over the counter pain relievers more than 14 days per month. This includes NSAIDS (Ibuprofen, Motrin, Advil, Naproxen, Aleve, etc), acetaminophen (Tylenol), aspirin, and Excedrin.     If you use a triptan medicine (sumatriptan, rizatriptan, frovatriptan, zolmitriptan, eletriptan etc) take it as soon as you notice the migraine starting. You can take a second dose 2 hours after the first dose if you still have any migraine symptoms.    It is fine to take 600 mg of ibuprofen (Advil) or 440 mg of naproxen (Aleve) with the triptan medicine.  Try not to use triptan medicines more than 2 days a week.    If you use your rescue medicine more than 2 times per week over the course of 1 month, set up an appointment with your provider to talk about starting a medication to prevent migraines.               Other Things I Can Do to Help My Migraines   Track Migraines and Triggers     Keep a headache journal of your symptoms. Try to track how often you have a headache, how long it lasts and what medicines you used. You can also track severity of headache.    You can use a smart phone deneen: My Migraine Yovanny. The information that you track in the deneen can be uploaded for your provider through [employer].     You can also track your migraines on paper. The clinic can print a copy of the Migraine Diary for you. Link: [email or web address]      Lifestyle Changes 1. Try to drink enough water each day (48-70 fluid ounces a day)  2. Limit caffeine intact-one caffeinated beverage a day  3. Eat regular meals  4. Try to get cardiovascular exercise (walking, swimming, biking) 4-5 times a week  5. Try to have a routine sleep schedule going to bed at the same time each night and getting up the same time each morning with enough time to sleep in between  6. Sometimes foods can trigger migraines you can try to eliminate them if you think they make symptoms worse: dairy, gluten, nuts  (cashews, almonds), artificial sweeteners, artificial colors, high sugar content foods, certain alcohol, chocolate, and preservatives like MSG and nitrates.      Other Therapies  Talk to your doctor about adding other therapies to your headache treatment plan including:   - Cognitive behavioral therapy  - Biofeedback  - Practice therapeutic techniques at home such as Progressive Relaxation and Deep Breathing    Research suggests that combining one or more of these therapies with medication can be helpful to reduce the number and severity of migraines.     Learn More To learn more about headaches you can go to the following websites:  https://americanmigrainefoundation.org/   http://www.headaches.org/

## 2021-03-31 NOTE — RESULT ENCOUNTER NOTE
Labs look good other than very  High cardiac risk score  Please see if she will try Lipitor 20 mg daily       The 10-year ASCVD risk score (Ema ORELLANA Jr., et al., 2013) is: 17.4%    Values used to calculate the score:      Age: 65 years      Sex: Female      Is Non- : No      Diabetic: No      Tobacco smoker: Yes      Systolic Blood Pressure: 128 mmHg      Is BP treated: Yes      HDL Cholesterol: 53 mg/dL      Total Cholesterol: 250 mg/dL

## 2021-04-01 LAB — DEPRECATED CALCIDIOL+CALCIFEROL SERPL-MC: 35 UG/L (ref 20–75)

## 2021-05-12 ENCOUNTER — HOSPITAL ENCOUNTER (OUTPATIENT)
Dept: CT IMAGING | Facility: HOSPITAL | Age: 66
Discharge: HOME | End: 2021-05-12
Attending: NURSE PRACTITIONER | Admitting: NURSE PRACTITIONER
Payer: MEDICARE

## 2021-05-12 DIAGNOSIS — R91.8 PULMONARY NODULES: ICD-10-CM

## 2021-05-12 PROCEDURE — 71250 CT THORAX DX C-: CPT

## 2021-10-01 ENCOUNTER — OFFICE VISIT (OUTPATIENT)
Dept: FAMILY MEDICINE | Facility: OTHER | Age: 66
End: 2021-10-01
Attending: NURSE PRACTITIONER
Payer: MEDICARE

## 2021-10-01 VITALS
TEMPERATURE: 98.6 F | SYSTOLIC BLOOD PRESSURE: 132 MMHG | OXYGEN SATURATION: 94 % | HEART RATE: 73 BPM | WEIGHT: 130 LBS | BODY MASS INDEX: 25.39 KG/M2 | RESPIRATION RATE: 20 BRPM | DIASTOLIC BLOOD PRESSURE: 70 MMHG

## 2021-10-01 DIAGNOSIS — Z87.891 PERSONAL HISTORY OF TOBACCO USE: ICD-10-CM

## 2021-10-01 DIAGNOSIS — I10 BENIGN ESSENTIAL HYPERTENSION: ICD-10-CM

## 2021-10-01 DIAGNOSIS — E78.5 HYPERLIPIDEMIA LDL GOAL <100: Primary | ICD-10-CM

## 2021-10-01 DIAGNOSIS — R35.0 URINARY FREQUENCY: ICD-10-CM

## 2021-10-01 DIAGNOSIS — L40.9 PSORIASIS: ICD-10-CM

## 2021-10-01 DIAGNOSIS — E55.9 VITAMIN D DEFICIENCY: ICD-10-CM

## 2021-10-01 DIAGNOSIS — Z12.11 SPECIAL SCREENING FOR MALIGNANT NEOPLASMS, COLON: ICD-10-CM

## 2021-10-01 LAB
ALBUMIN SERPL-MCNC: 3.9 G/DL (ref 3.4–5)
ALBUMIN UR-MCNC: NEGATIVE MG/DL
ALP SERPL-CCNC: 76 U/L (ref 40–150)
ALT SERPL W P-5'-P-CCNC: 23 U/L (ref 0–50)
ANION GAP SERPL CALCULATED.3IONS-SCNC: 5 MMOL/L (ref 3–14)
APPEARANCE UR: CLEAR
AST SERPL W P-5'-P-CCNC: 13 U/L (ref 0–45)
BACTERIA #/AREA URNS HPF: ABNORMAL /HPF
BILIRUB SERPL-MCNC: 0.7 MG/DL (ref 0.2–1.3)
BILIRUB UR QL STRIP: NEGATIVE
BUN SERPL-MCNC: 12 MG/DL (ref 7–30)
CALCIUM SERPL-MCNC: 9.1 MG/DL (ref 8.5–10.1)
CHLORIDE BLD-SCNC: 105 MMOL/L (ref 94–109)
CHOLEST SERPL-MCNC: 237 MG/DL
CO2 SERPL-SCNC: 25 MMOL/L (ref 20–32)
COLOR UR AUTO: YELLOW
CREAT SERPL-MCNC: 0.73 MG/DL (ref 0.52–1.04)
FASTING STATUS PATIENT QL REPORTED: YES
GFR SERPL CREATININE-BSD FRML MDRD: 85 ML/MIN/1.73M2
GLUCOSE BLD-MCNC: 88 MG/DL (ref 70–99)
GLUCOSE UR STRIP-MCNC: NEGATIVE MG/DL
HDLC SERPL-MCNC: 54 MG/DL
HGB UR QL STRIP: NEGATIVE
HOLD SPECIMEN: NORMAL
KETONES UR STRIP-MCNC: NEGATIVE MG/DL
LDLC SERPL CALC-MCNC: 161 MG/DL
LEUKOCYTE ESTERASE UR QL STRIP: ABNORMAL
NITRATE UR QL: NEGATIVE
NONHDLC SERPL-MCNC: 183 MG/DL
PH UR STRIP: 5.5 [PH] (ref 5–7)
POTASSIUM BLD-SCNC: 4.2 MMOL/L (ref 3.4–5.3)
PROT SERPL-MCNC: 7.6 G/DL (ref 6.8–8.8)
RBC #/AREA URNS AUTO: ABNORMAL /HPF
SODIUM SERPL-SCNC: 135 MMOL/L (ref 133–144)
SP GR UR STRIP: 1.02 (ref 1–1.03)
SQUAMOUS #/AREA URNS AUTO: ABNORMAL /LPF
TRIGL SERPL-MCNC: 111 MG/DL
UROBILINOGEN UR STRIP-ACNC: 0.2 E.U./DL
WBC #/AREA URNS AUTO: ABNORMAL /HPF

## 2021-10-01 PROCEDURE — G0463 HOSPITAL OUTPT CLINIC VISIT: HCPCS

## 2021-10-01 PROCEDURE — 99214 OFFICE O/P EST MOD 30 MIN: CPT | Performed by: NURSE PRACTITIONER

## 2021-10-01 PROCEDURE — G0296 VISIT TO DETERM LDCT ELIG: HCPCS | Performed by: NURSE PRACTITIONER

## 2021-10-01 PROCEDURE — 81015 MICROSCOPIC EXAM OF URINE: CPT | Mod: ZL | Performed by: NURSE PRACTITIONER

## 2021-10-01 PROCEDURE — G0463 HOSPITAL OUTPT CLINIC VISIT: HCPCS | Mod: 25

## 2021-10-01 PROCEDURE — 80061 LIPID PANEL: CPT | Mod: ZL | Performed by: NURSE PRACTITIONER

## 2021-10-01 PROCEDURE — 82306 VITAMIN D 25 HYDROXY: CPT | Mod: ZL | Performed by: NURSE PRACTITIONER

## 2021-10-01 PROCEDURE — 36415 COLL VENOUS BLD VENIPUNCTURE: CPT | Mod: ZL | Performed by: NURSE PRACTITIONER

## 2021-10-01 PROCEDURE — 87086 URINE CULTURE/COLONY COUNT: CPT | Mod: ZL | Performed by: NURSE PRACTITIONER

## 2021-10-01 PROCEDURE — 82040 ASSAY OF SERUM ALBUMIN: CPT | Mod: ZL | Performed by: NURSE PRACTITIONER

## 2021-10-01 RX ORDER — TRIAMCINOLONE ACETONIDE 5 MG/G
OINTMENT TOPICAL
Qty: 60 G | Refills: 1 | Status: SHIPPED | OUTPATIENT
Start: 2021-10-01 | End: 2022-04-11

## 2021-10-01 RX ORDER — LOSARTAN POTASSIUM 50 MG/1
50 TABLET ORAL DAILY
Qty: 90 TABLET | Refills: 1 | Status: SHIPPED | OUTPATIENT
Start: 2021-10-01 | End: 2022-04-11

## 2021-10-01 RX ORDER — CHOLECALCIFEROL (VITAMIN D3) 50 MCG
1 TABLET ORAL DAILY
Qty: 90 TABLET | Refills: 3 | Status: SHIPPED | OUTPATIENT
Start: 2021-10-01

## 2021-10-01 RX ORDER — METOPROLOL SUCCINATE 25 MG/1
25 TABLET, EXTENDED RELEASE ORAL DAILY
Qty: 90 TABLET | Refills: 1 | Status: SHIPPED | OUTPATIENT
Start: 2021-10-01 | End: 2022-04-11

## 2021-10-01 ASSESSMENT — ANXIETY QUESTIONNAIRES
5. BEING SO RESTLESS THAT IT IS HARD TO SIT STILL: NOT AT ALL
3. WORRYING TOO MUCH ABOUT DIFFERENT THINGS: NOT AT ALL
IF YOU CHECKED OFF ANY PROBLEMS ON THIS QUESTIONNAIRE, HOW DIFFICULT HAVE THESE PROBLEMS MADE IT FOR YOU TO DO YOUR WORK, TAKE CARE OF THINGS AT HOME, OR GET ALONG WITH OTHER PEOPLE: NOT DIFFICULT AT ALL
2. NOT BEING ABLE TO STOP OR CONTROL WORRYING: NOT AT ALL
GAD7 TOTAL SCORE: 0
6. BECOMING EASILY ANNOYED OR IRRITABLE: NOT AT ALL
1. FEELING NERVOUS, ANXIOUS, OR ON EDGE: NOT AT ALL
7. FEELING AFRAID AS IF SOMETHING AWFUL MIGHT HAPPEN: NOT AT ALL

## 2021-10-01 ASSESSMENT — PAIN SCALES - GENERAL: PAINLEVEL: NO PAIN (0)

## 2021-10-01 ASSESSMENT — PATIENT HEALTH QUESTIONNAIRE - PHQ9
SUM OF ALL RESPONSES TO PHQ QUESTIONS 1-9: 0
5. POOR APPETITE OR OVEREATING: NOT AT ALL

## 2021-10-01 NOTE — PATIENT INSTRUCTIONS
Assessment & Plan       Vitamin D deficiency  - vitamin D3 (CHOLECALCIFEROL) 50 mcg (2000 units) tablet; Take 1 tablet (50 mcg) by mouth daily  - Vitamin D level    Benign essential hypertension  - Comprehensive metabolic panel; Future  - metoprolol succinate ER (TOPROL-XL) 25 MG 24 hr tablet; Take 1 tablet (25 mg) by mouth daily  - losartan (COZAAR) 50 MG tablet; Take 1 tablet (50 mg) by mouth daily  - Comprehensive metabolic panel    Hyperlipidemia LDL goal <100  - Comprehensive metabolic panel  - Lipid Profile (Chol, Trig, HDL, LDL calc)    Urinary frequency  - *UA reflex to Microscopic and Culture - [facility]; Future  - Adult Urology Referral; Future    Psoriasis  - triamcinolone (KENALOG) 0.5 % external ointment; Apply to affected areas BID    Special screening for malignant neoplasms, colon  - COLOGUARD([employer])      Low dose chest CT ordered      Return in about 6 months (around 4/1/2022).      Vannessa Anderson CNP  [facility]        Lung Cancer Screening   Frequently Asked Questions  If you are at high-risk for lung cancer, getting screened with low-dose computed tomography (LDCT) every year can help save your life. This handout offers answers to some of the most common questions about lung cancer screening. If you have other questions, please call [phone] ([phone]).     What is it?  Lung cancer screening uses special X-ray technology to create an image of your lung tissue. The exam is quick and easy and takes less than 10 seconds. We don t give you any medicine or use any needles. You can eat before and after the exam. You don t need to change your clothes as long as the clothing on your chest doesn t contain metal. But, you do need to be able to hold your breath for at least 6 seconds during the exam.    What is the goal of lung cancer screening?  The goal of lung cancer screening is to save lives. Many times, lung cancer is not found until a person  starts having physical symptoms. Lung cancer screening can help detect lung cancer in the earliest stages when it may be easier to treat.    Who should be screened for lung cancer?  We suggest lung cancer screening for anyone who is at high-risk for lung cancer. You are in the high-risk group if you:      are between the ages of 55 and 79, and    have smoked at least 1 pack of cigarettes a day for 30 or more years, and    still smoke or have quit within the past 15 years.    However, if you have a new cough or shortness of breath, you should talk to your doctor before being screened.    Some national lung health advocacy groups also recommend screening for people ages 50 to 79 who have smoked an average of 1 pack of cigarettes a day for 20 years. They must also have at least 1 other risk factor for lung cancer, not including exposure to secondhand smoke. Other risk factors are having had cancer in the past, emphysema, pulmonary fibrosis, COPD, a family history of lung cancer, or exposure to certain materials such as arsenic, asbestos, beryllium, cadmium, chromium, diesel fumes, nickel, radon or silica. Your care team can help you know if you have one of these risk factors.     Why does it matter if I have symptoms?  Certain symptoms can be a sign that you have a condition in your lungs that should be checked and treated by your doctor. These symptoms include fever, chest pain, a new or changing cough, shortness of breath that you have never felt before, coughing up blood or unexplained weight loss. Having any of these symptoms can greatly affect the results of lung cancer screening.       Should all smokers get an LDCT lung cancer screening exam?  It depends. Lung cancer screening is for a very specific group of men and women who have a history of heavy smoking over a long period of time (see  Who should be screened for lung cancer  above).  I am in the high-risk group, but have been diagnosed with cancer in the  past. Is LDCT lung cancer screening right for me?  In some cases, you should not have LDCT lung screening, such as when your doctor is already following your cancer with CT scan studies. Your doctor will help you decide if LDCT lung screening is right for you.  Do I need to have a screening exam every year?  Yes. If you are in the high-risk group described earlier, you should get an LDCT lung cancer screening exam every year until you are 79, or are no longer willing or able to undergo screening and possible procedures to diagnose and treat lung cancer.  How effective is LDCT at preventing death from lung cancer?  Studies have shown that LDCT lung cancer screening can lower the risk of death from lung cancer by 20 percent in people who are at high-risk.  What are the risks?  There are some risks and limitations of LDCT lung cancer screening. We want to make sure you understand the risks and benefits, so please let us know if you have any questions. Your doctor may want to talk with you more about these risks.    Radiation exposure: As with any exam that uses radiation, there is a very small increased risk of cancer. The amount of radiation in LDCT is small--about the same amount a person would get from a mammogram. Your doctor orders the exam when he or she feels the potential benefits outweigh the risks.    False negatives: No test is perfect, including LDCT. It is possible that you may have a medical condition, including lung cancer, that is not found during your exam. This is called a false negative result.    False positives and more testing: LDCT very often finds something in the lung that could be cancer, but in fact is not. This is called a false positive result. False positive tests often cause anxiety. To make sure these findings are not cancer, you may need to have more tests. These tests will be done only if you give us permission. Sometimes patients need a treatment that can have side effects, such as a  biopsy. For more information on false positives, see  What can I expect from the results?     Findings not related to lung cancer: Your LDCT exam also takes pictures of areas of your body next to your lungs. In a very small number of cases, the CT scan will show an abnormal finding in one of these areas, such as your kidneys, adrenal glands, liver or thyroid. This finding may not be serious, but you may need more tests. Your doctor can help you decide what other tests you may need, if any.  What can I expect from the results?  About 1 out of 4 LDCT exams will find something that may need more tests. Most of the time, these findings are lung nodules. Lung nodules are very small collections of tissue in the lung. These nodules are very common, and the vast majority--more than 97 percent--are not cancer (benign). Most are normal lymph nodes or small areas of scarring from past infections.  But, if a small lung nodule is found to be cancer, the cancer can be cured more than 90 percent of the time. To know if the nodule is cancer, we may need to get more images before your next yearly screening exam. If the nodule has suspicious features (for example, it is large, has an odd shape or grows over time), we will refer you to a specialist for further testing.  Will my doctor also get the results?  Yes. Your doctor will get a copy of your results.  Is it okay to keep smoking now that there s a cancer screening exam?  No. Tobacco is one of the strongest cancer-causing agents. It causes not only lung cancer, but other cancers and cardiovascular (heart) diseases as well. The damage caused by smoking builds over time. This means that the longer you smoke, the higher your risk of disease. While it is never too late to quit, the sooner you quit, the better.  Where can I find help to quit smoking?  The best way to prevent lung cancer is to stop smoking. If you have already quit smoking, congratulations and keep it up! For help on  quitting smoking, please call [employer] at 9-616-654-FPPL ([phone]) or the American Cancer Society at [phone] to find local resources near you.  One-on-one health coaching:  If you d prefer to work individually with a health care provider on tobacco cessation, we offer:      Medication Therapy Management:  Our specially trained pharmacists work closely with you and your doctor to help you quit smoking.  Call [phone] or [phone] (toll free).     Can Do: Health coaching offered by [facility].  [email or web address]

## 2021-10-01 NOTE — LETTER
October 5, 2021      Rita E Shantanu  [address]  [place] [id number]        Dear ,    We are writing to inform you of your test results.    Your test results fall within the expected range(s) or remain unchanged from previous results.  Please continue with current treatment plan.    Resulted Orders   Comprehensive metabolic panel   Result Value Ref Range    Sodium 135 133 - 144 mmol/L    Potassium 4.2 3.4 - 5.3 mmol/L    Chloride 105 94 - 109 mmol/L    Carbon Dioxide (CO2) 25 20 - 32 mmol/L    Anion Gap 5 3 - 14 mmol/L    Urea Nitrogen 12 7 - 30 mg/dL    Creatinine 0.73 0.52 - 1.04 mg/dL    Calcium 9.1 8.5 - 10.1 mg/dL    Glucose 88 70 - 99 mg/dL    Alkaline Phosphatase 76 40 - 150 U/L    AST 13 0 - 45 U/L    ALT 23 0 - 50 U/L    Protein Total 7.6 6.8 - 8.8 g/dL    Albumin 3.9 3.4 - 5.0 g/dL    Bilirubin Total 0.7 0.2 - 1.3 mg/dL    GFR Estimate 85 >60 mL/min/1.73m2      Comment:      As of July 11, 2021, eGFR is calculated by the CKD-EPI creatinine equation, without race adjustment. eGFR can be influenced by muscle mass, exercise, and diet. The reported eGFR is an estimation only and is only applicable if the renal function is stable.   Lipid Profile (Chol, Trig, HDL, LDL calc)   Result Value Ref Range    Cholesterol 237 (H) <200 mg/dL    Triglycerides 111 <150 mg/dL    Direct Measure HDL 54 >=50 mg/dL    LDL Cholesterol Calculated 161 (H) <=100 mg/dL    Non HDL Cholesterol 183 (H) <130 mg/dL    Patient Fasting > 8hrs? Yes     Narrative    Cholesterol  Desirable:  <200 mg/dL    Triglycerides  Normal:  Less than 150 mg/dL  Borderline High:  150-199 mg/dL  High:  200-499 mg/dL  Very High:  Greater than or equal to 500 mg/dL    Direct Measure HDL  Female:  Greater than or equal to 50 mg/dL   Male:  Greater than or equal to 40 mg/dL    LDL Cholesterol  Desirable:  <100mg/dL  Above Desirable:  100-129 mg/dL   Borderline High:  130-159 mg/dL   High:  160-189 mg/dL   Very High:  >= 190 mg/dL    Non HDL  Cholesterol  Desirable:  130 mg/dL  Above Desirable:  130-159 mg/dL  Borderline High:  160-189 mg/dL  High:  190-219 mg/dL  Very High:  Greater than or equal to 220 mg/dL   Vitamin D Deficiency   Result Value Ref Range    Vitamin D, Total (25-Hydroxy) 61 20 - 75 ug/L    Narrative    Season, race, dietary intake, and treatment affect the concentration of 25-hydroxy-Vitamin D. Values may decrease during winter months and increase during summer months. Values 20-29 ug/L may indicate Vitamin D insufficiency and values <20 ug/L may indicate Vitamin D deficiency.    Vitamin D determination is routinely performed by an immunoassay specific for 25 hydroxyvitamin D3.  If an individual is on vitamin D2(ergocalciferol) supplementation, please specify 25 OH vitamin D2 and D3 level determination by LCMSMS test [id number].     Extra Purple Top Tube   Result Value Ref Range    Hold Specimen JIC    *UA reflex to Microscopic and Culture - [facility]   Result Value Ref Range    Color Urine Yellow Colorless, Straw, Light Yellow, Yellow    Appearance Urine Clear Clear    Glucose Urine Negative Negative mg/dL    Bilirubin Urine Negative Negative    Ketones Urine Negative Negative mg/dL    Specific Gravity Urine 1.020 1.003 - 1.035    Blood Urine Negative Negative    pH Urine 5.5 5.0 - 7.0    Protein Albumin Urine Negative Negative mg/dL    Urobilinogen Urine 0.2 0.2, 1.0 E.U./dL    Nitrite Urine Negative Negative    Leukocyte Esterase Urine Moderate (A) Negative   Urine Microscopic Exam   Result Value Ref Range    Bacteria Urine Few (A) None Seen /HPF    RBC Urine 0-2 0-2 /HPF /HPF    WBC Urine 5-10 (A) 0-5 /HPF /HPF    Squamous Epithelials Urine Few (A) None Seen /LPF   Urine Culture   Result Value Ref Range    Culture Mixed Urogenital Jovanna        If you have any questions or concerns, please call the clinic at the number listed above.       Sincerely,      Vannessa Anderson, CNP

## 2021-10-01 NOTE — PROGRESS NOTES
Assessment & Plan       Vitamin D deficiency  - vitamin D3 (CHOLECALCIFEROL) 50 mcg (2000 units) tablet; Take 1 tablet (50 mcg) by mouth daily  - Vitamin D level    Benign essential hypertension  - Comprehensive metabolic panel; Future  - metoprolol succinate ER (TOPROL-XL) 25 MG 24 hr tablet; Take 1 tablet (25 mg) by mouth daily  - losartan (COZAAR) 50 MG tablet; Take 1 tablet (50 mg) by mouth daily  - Comprehensive metabolic panel    Hyperlipidemia LDL goal <100  - Comprehensive metabolic panel  - Lipid Profile (Chol, Trig, HDL, LDL calc)    Urinary frequency  - *UA reflex to Microscopic and Culture - [facility]; Future  - Adult Urology Referral; Future    Psoriasis  - triamcinolone (KENALOG) 0.5 % external ointment; Apply to affected areas BID    Special screening for malignant neoplasms, colon  - COLOGUARD([employer])      Low dose chest CT ordered      Return in about 6 months (around 4/1/2022).      Vannessa Anderson, LIZABETH  [facility] [place] [facility]ERRELL Barrow is a 66 year old who presents for the following health issues       Hyperlipidemia Follow-Up    Are you regularly taking any medication or supplement to lower your cholesterol?   No    Are you having muscle aches or other side effects that you think could be caused by your cholesterol lowering medication?  No      Hypertension Follow-up    Do you check your blood pressure regularly outside of the clinic? Yes     Are you following a low salt diet? Yes    Are your blood pressures ever more than 140 on the top number (systolic) OR more   than 90 on the bottom number (diastolic), for example 140/90? Yes      Migraine     Since your last clinic visit, how have your headaches changed?  No change    How often are you getting headaches or migraines? 4-5 a month     Are you able to do normal daily activities when you have a migraine? No    Are you taking rescue/relief medications? (Select all that apply) ibuprofen (Advil, Motrin) and  "Tylenol    How helpful is your rescue/relief medication?  I get some relief    Are you taking any medications to prevent migraines? (Select all that apply)  No    In the past 4 weeks, how often have you gone to urgent care or the emergency room because of your headaches?  0      Vitamin D deficiency -  2000mg of Vitamin D daily      Urinary Frequency  Up a minimum of 5 times nightly to urinate  Urgency, pressure  No dysuria      Psoriasis - legs  Chronic, needs a refill on triamcinolone  Has seen Dermatology        Patient Active Problem List   Diagnosis     Dermatitis     Alopecia     Vitamin D deficiency     Benign essential hypertension     Hyperlipidemia LDL goal <100     Migraine with aura and without status migrainosus, not intractable     CNH (chondrodermatitis nodularis helicis)     Lichen sclerosus et atrophicus--VULVA 2012, Clobetasol, exam--7/21/2020     Other atopic dermatitis and related conditions     Microscopic hematuria--6/2020, 7/2020     Psoriasis     Past Surgical History:   Procedure Laterality Date     BIOPSY Right 06/15/2017    US guided right breast needle biopsy     BIOPSY BREAST Bilateral 7/17/2017    Procedure: BIOPSY BREAST;  EXCISIONAL BIOPSY RIGHT BREAST MASS, EXCISION LEFT BREAST SKIN TAG;  Surgeon: Tianna Hanley MD;  Location: [facility] OR     BLADDER SURGERY  1981    \"bladder tacked up\"     C ORAL SURGERY PROCEDURE  08/23/2018    Had rods put in to prep for implants      CHOLECYSTECTOMY  1975    Cholelithiasis     COLONOSCOPY N/A 1/12/2015    Procedure: COLONOSCOPY;  Surgeon: Tianna Hanley MD;  Location: [facility] OR     facial redisection gland removal      LEFT > infected neck gland     HYSTERECTOMY TOTAL ABDOMINAL N/A 01/01/1981       Social History     Tobacco Use     Smoking status: Current Every Day Smoker     Packs/day: 2.00     Years: 30.00     Pack years: 60.00     Types: Cigarettes     Smokeless tobacco: Never Used     Tobacco comment: Tried to Quit (YES); Longest Tobacco Free " "(\"Cutdown\")   Substance Use Topics     Alcohol use: Yes     Comment: OCCASIONALLY     Family History   Problem Relation Age of Onset     Cancer Brother         Liver     Colon Cancer Brother      Cancer Brother         Pancreatic     Cancer Other         Family Hx     Diabetes Father      Cancer Father         Stomach     Heart Disease Father         Heart Disease     Heart Disease Mother         Heart Disease     Cancer Mother 73        Liver     Cancer Sister 57        Pancreatic - Cause of Death           Current Outpatient Medications   Medication Sig Dispense Refill     clobetasol (TEMOVATE) 0.05 % external cream Apply topically 2 times daily Takes for vaginal itching for her LSA per pt. 45 g 1     Cyanocobalamin (B-12) 1000 MCG TBCR        Lactobacillus (PROBIOTIC ACIDOPHILUS PO)        losartan (COZAAR) 50 MG tablet Take 1 tablet (50 mg) by mouth daily 90 tablet 1     metoprolol succinate ER (TOPROL-XL) 25 MG 24 hr tablet Take 1 tablet (25 mg) by mouth daily 90 tablet 1     triamcinolone (KENALOG) 0.5 % external ointment Apply to affected areas BID 60 g 1     vitamin D3 (CHOLECALCIFEROL) 50 mcg (2000 units) tablet Take 1 tablet (50 mcg) by mouth daily 90 tablet 3       Allergies   Allergen Reactions     Bacitracin      Codeine Nausea and Vomiting     cramping     Doxepin Unknown     face swelling     No Clinical Screening - See Comments Other (See Comments)     face swelling     Penicillins Hives     Provider wants to try Ancef(7/17/17).  Hives developed within 45 minutes.     Simvastatin Hives       Recent Labs   Lab Test 03/30/21  1415 11/02/20  1353 06/14/20  1846 01/26/20  1237 01/26/20  1237 10/04/19  1353 10/04/19  1353   * 118*  --   --   --   --  138*   HDL 53 55  --   --   --   --  49*   TRIG 171* 153*  --   --   --   --  105   ALT 24  --  28  --  23   < > 21   CR 0.48*  --  0.54   < > 0.57   < > 0.54   GFRESTIMATED >90  --  >90   < > >90   < > >90   GFRESTBLACK >90  --  >90   < > >90   < > " >90   POTASSIUM 4.1  --  3.8   < > 4.3   < > 4.4   TSH 1.96 1.44  --   --   --    < > 0.96    < > = values in this interval not displayed.        BP Readings from Last 3 Encounters:   10/01/21 132/70   03/30/21 128/70   11/02/20 118/62    Wt Readings from Last 3 Encounters:   10/01/21 59 kg (130 lb)   03/30/21 59 kg (130 lb)   11/02/20 58.1 kg (128 lb)              Review of Systems   Constitutional, HEENT, cardiovascular, pulmonary, GI, , musculoskeletal, neuro, skin, endocrine and psych systems are negative, except as otherwise noted.          Objective    /70 (BP Location: Left arm, Patient Position: Sitting, Cuff Size: Adult Regular)   Pulse 73   Temp 98.6  F (37  C) (Tympanic)   Resp 20   Wt 59 kg (130 lb)   SpO2 94%   BMI 25.39 kg/m    Body mass index is 25.39 kg/m .         Physical Exam   GENERAL: healthy, alert and no distress  EYES: Eyes grossly normal to inspection, PERRL and conjunctivae and sclerae normal  HENT: ear canals and TM's normal, nose and mouth without ulcers or lesions  NECK: no adenopathy, no asymmetry, masses, or scars and thyroid normal to palpation  RESP: lungs clear to auscultation - no rales, rhonchi or wheezes  CV: regular rate and rhythm, normal S1 S2, no S3 or S4, no murmur, click or rub, no peripheral edema and peripheral pulses strong  ABDOMEN: soft, nontender, no hepatosplenomegaly, no masses and bowel sounds normal  MS: no gross musculoskeletal defects noted, no edema  SKIN: no suspicious lesions or rashes  PSYCH: mentation appears normal, affect normal/bright              Lung Cancer Screening Shared Decision Making Visit     Rita Fournier is eligible for lung cancer screening on the basis of the information provided in my signed lung cancer screening order.     I have discussed with patient the risks and benefits of screening for lung cancer with low-dose CT.     The risks include:  radiation exposure: one low dose chest CT has as much ionizing radiation as  about 15 chest x-rays or 6 months of background radiation living in [place]    false positives: 96% of positive findings/nodules are NOT cancer, but some might still require additional diagnostic evaluation, including biopsy  over-diagnosis: some slow growing cancers that might never have been clinically significant will be detected and treated unnecessarily     The benefit of early detection of lung cancer is contingent upon adherence to annual screening or more frequent follow up if indicated.     Furthermore, reaping the benefits of screening requires Rita Fournier to be willing and physically able to undergo diagnostic procedures, if indicated. Although no specific guide is available for determining severity of comorbidities, it is reasonable to withhold screening in patients who have greater mortality risk from other diseases.     We did discuss that the only way to prevent lung cancer is to not smoke. Smoking cessation counseling was given, duration < 3 minutes.      I did offer risk estimation using a calculator such as this one:    ShouldIScreen

## 2021-10-01 NOTE — NURSING NOTE
Chief Complaint   Patient presents with     Chronic Disease Management       Initial /70 (BP Location: Left arm, Patient Position: Sitting, Cuff Size: Adult Regular)   Pulse 73   Temp 98.6  F (37  C) (Tympanic)   Resp 20   Wt 59 kg (130 lb)   SpO2 94%   BMI 25.39 kg/m   Estimated body mass index is 25.39 kg/m  as calculated from the following:    Height as of 3/30/21: 1.524 m (5').    Weight as of this encounter: 59 kg (130 lb).  Medication Reconciliation: complete  Nakita Moseley LPN

## 2021-10-02 LAB — BACTERIA UR CULT: NORMAL

## 2021-10-02 ASSESSMENT — ANXIETY QUESTIONNAIRES: GAD7 TOTAL SCORE: 0

## 2021-10-02 NOTE — RESULT ENCOUNTER NOTE
Labs look good other than elevated lipids and very high cardiac risk score.  Please see if she will try Lipitor 20 mg daily.  She had poor tolerance to Zocor, but should do fine on Lipitor.    Low fat diet  Fish oil 3 per day    The 10-year ASCVD risk score (Ema ORELLANA Jr., et al., 2013) is: 19.2%    Values used to calculate the score:      Age: 66 years      Sex: Female      Is Non- : No      Diabetic: No      Tobacco smoker: Yes      Systolic Blood Pressure: 132 mmHg      Is BP treated: Yes      HDL Cholesterol: 54 mg/dL      Total Cholesterol: 237 mg/dL

## 2021-10-05 LAB — DEPRECATED CALCIDIOL+CALCIFEROL SERPL-MC: 61 UG/L (ref 20–75)

## 2021-10-28 DIAGNOSIS — R31.29 MICROSCOPIC HEMATURIA: Primary | ICD-10-CM

## 2021-11-04 ENCOUNTER — LAB (OUTPATIENT)
Dept: LAB | Facility: OTHER | Age: 66
End: 2021-11-04
Attending: NURSE PRACTITIONER
Payer: MEDICARE

## 2021-11-04 ENCOUNTER — OFFICE VISIT (OUTPATIENT)
Dept: UROLOGY | Facility: OTHER | Age: 66
End: 2021-11-04
Attending: NURSE PRACTITIONER
Payer: COMMERCIAL

## 2021-11-04 VITALS
SYSTOLIC BLOOD PRESSURE: 128 MMHG | WEIGHT: 130 LBS | HEART RATE: 65 BPM | HEIGHT: 61 IN | DIASTOLIC BLOOD PRESSURE: 72 MMHG | BODY MASS INDEX: 24.55 KG/M2 | OXYGEN SATURATION: 97 % | TEMPERATURE: 98 F

## 2021-11-04 DIAGNOSIS — R35.0 URINARY FREQUENCY: ICD-10-CM

## 2021-11-04 DIAGNOSIS — R31.29 MICROSCOPIC HEMATURIA: ICD-10-CM

## 2021-11-04 LAB
ALBUMIN UR-MCNC: NEGATIVE MG/DL
APPEARANCE UR: ABNORMAL
BACTERIA #/AREA URNS HPF: ABNORMAL /HPF
BILIRUB UR QL STRIP: NEGATIVE
COLOR UR AUTO: ABNORMAL
GLUCOSE UR STRIP-MCNC: NEGATIVE MG/DL
HGB UR QL STRIP: ABNORMAL
KETONES UR STRIP-MCNC: NEGATIVE MG/DL
LEUKOCYTE ESTERASE UR QL STRIP: ABNORMAL
MUCOUS THREADS #/AREA URNS LPF: PRESENT /LPF
NITRATE UR QL: NEGATIVE
PH UR STRIP: 5 [PH] (ref 4.7–8)
RBC URINE: 1 /HPF
SP GR UR STRIP: 1.01 (ref 1–1.03)
SQUAMOUS EPITHELIAL: 2 /HPF
UROBILINOGEN UR STRIP-MCNC: NORMAL MG/DL
WBC URINE: 2 /HPF

## 2021-11-04 PROCEDURE — 87086 URINE CULTURE/COLONY COUNT: CPT | Mod: ZL

## 2021-11-04 PROCEDURE — G0463 HOSPITAL OUTPT CLINIC VISIT: HCPCS

## 2021-11-04 PROCEDURE — 81001 URINALYSIS AUTO W/SCOPE: CPT | Mod: ZL

## 2021-11-04 PROCEDURE — 51798 US URINE CAPACITY MEASURE: CPT | Performed by: UROLOGY

## 2021-11-04 PROCEDURE — 99213 OFFICE O/P EST LOW 20 MIN: CPT | Mod: 25 | Performed by: UROLOGY

## 2021-11-04 ASSESSMENT — ENCOUNTER SYMPTOMS: HEADACHES: 1

## 2021-11-04 ASSESSMENT — MIFFLIN-ST. JEOR: SCORE: 1057.06

## 2021-11-04 ASSESSMENT — PAIN SCALES - GENERAL: PAINLEVEL: NO PAIN (0)

## 2021-11-04 NOTE — NURSING NOTE
"Chief Complaint   Patient presents with     Consult For     Urinary frequency mostly at night-Vannessa Anderson is referring       Initial /72 (BP Location: Left arm, Patient Position: Chair, Cuff Size: Adult Large)   Pulse 65   Temp 98  F (36.7  C) (Tympanic)   Ht 1.549 m (5' 1\")   Wt 59 kg (130 lb)   SpO2 97%   BMI 24.56 kg/m   Estimated body mass index is 24.56 kg/m  as calculated from the following:    Height as of this encounter: 1.549 m (5' 1\").    Weight as of this encounter: 59 kg (130 lb).  Medication Reconciliation: complete  LESLIE MONDRAGON LPN    Review of Systems:    Weight loss:    No     Recent fever/chills:  No   Night sweats:   No  Current skin rash:  No   Recent hair loss:  No  Heat intolerance:  No   Cold intolerance:  No  Chest pain:   No   Palpitations:   No  Shortness of breath:  No   Wheezing:   No  Constipation:    No   Diarrhea:   No   Nausea:   No   Vomiting:   No   Kidney/side pain:  No   Back pain:   No  Frequent headaches:  yes   Dizziness:     No  Leg swelling:   No   Calf pain:    No    Parents, brothers or sisters with history of kidney cancer?   No  Parents, brothers or sisters with history of bladder cancer: No    "

## 2021-11-04 NOTE — LETTER
11/4/2021       RE: Rita Fournier  [address] [facility] [address]     Dear Colleague,    Thank you for referring your patient, Rita Fournier, to the [facility]. Please see a copy of my visit note below.      History     Chief Complaint:      Consult For (Urinary frequency mostly at night-Vannessa Anderson is referring)      HPI   Rita Fournier is a 66 year old female who presents for further evaluation of her urinary frequency and nocturia.  Pushpa was seen about a year and a half ago because of microscopic hematuria and that evaluation was negative.  She did have a slight distal urethral stricture from her lichen sclerosis which I had to pop through and dilate with my cystoscope.  She said after I did that her stream was much better and she still has a good stream.  She feels like she empties her bladder fine.  She is voiding about every 2 hours during the day but at night she is getting up 5 times.  Early in the night she will urinate a fair amount but then later on in the early morning she will have an urge but only urinate a small amount.  She drinks 3 cups of coffee a day in the morning no pop and rarely tea.  Vitamin water and plain water after that.  She has not had any further hematuria and no blood in the urine.  She does not experience any prolapse or vaginal bleeding.    Allergies:    Allergies   Allergen Reactions     Bacitracin      Codeine Nausea and Vomiting     cramping     Doxepin Unknown     face swelling     No Clinical Screening - See Comments Other (See Comments)     face swelling     Penicillins Hives     Provider wants to try Ancef(7/17/17).  Hives developed within 45 minutes.     Simvastatin Hives        Medications:      clobetasol (TEMOVATE) 0.05 % external cream  Cyanocobalamin (B-12) 1000 MCG TBCR  Lactobacillus (PROBIOTIC ACIDOPHILUS PO)  losartan (COZAAR) 50 MG tablet  metoprolol succinate ER  (TOPROL-XL) 25 MG 24 hr tablet  triamcinolone (KENALOG) 0.5 % external ointment  vitamin D3 (CHOLECALCIFEROL) 50 mcg (2000 units) tablet        Problem List:      Patient Active Problem List    Diagnosis Date Noted     Psoriasis 10/01/2021     Priority: Medium     Microscopic hematuria--2020, 2020     Priority: Medium     Benign essential hypertension 2018     Priority: Medium     Hyperlipidemia LDL goal <100 2018     Priority: Medium     Migraine with aura and without status migrainosus, not intractable 2018     Priority: Medium     Vitamin D deficiency 2013     Priority: Medium     Dermatitis      Priority: Medium     Alopecia      Priority: Medium     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     Priority: Medium     Lichen sclerosus et atrophicus--VULVA 2012, Clobetasol, exam--2020 10/17/2012     Priority: Medium     Other atopic dermatitis and related conditions 10/17/2012     Priority: Medium        Past Medical History:      Past Medical History:   Diagnosis Date     Alopecia      Benign essential hypertension 2018     Dermatitis      Gastroesophageal reflux disease without esophagitis 2018     Hyperlipidemia LDL goal < 100      Hyperlipidemia LDL goal <100 2018     Interstitial emphysema and related condition of  3/30/2021     Lump of breast, right 2017     Migraine with aura and without status migrainosus, not intractable 2018     Nipple discharge in female 2017     PONV (postoperative nausea and vomiting)      Psoriasis 10/1/2021     Taking a statin medication 2018     Tobacco abuse 2011     Vitamin D deficiency 2013       Past Surgical History:      Past Surgical History:   Procedure Laterality Date     BIOPSY Right 06/15/2017    US guided right breast needle biopsy     BIOPSY BREAST Bilateral 2017    Procedure: BIOPSY BREAST;  EXCISIONAL BIOPSY RIGHT BREAST MASS, EXCISION LEFT BREAST SKIN TAG;   "Surgeon: Tianna Hanley MD;  Location: [facility]     BLADDER SURGERY  1981    \"bladder tacked up\"     C ORAL SURGERY PROCEDURE  08/23/2018    Had rods put in to prep for implants      CHOLECYSTECTOMY  1975    Cholelithiasis     COLONOSCOPY N/A 1/12/2015    Procedure: COLONOSCOPY;  Surgeon: Tianna Hanley MD;  Location: [facility] OR     facial redisection gland removal      LEFT > infected neck gland     HYSTERECTOMY TOTAL ABDOMINAL N/A 01/01/1981       Family History:      Family History   Problem Relation Age of Onset     Cancer Brother         Liver     Colon Cancer Brother      Cancer Brother         Pancreatic     Cancer Other         Family Hx     Diabetes Father      Cancer Father         Stomach     Heart Disease Father         Heart Disease     Heart Disease Mother         Heart Disease     Cancer Mother 73        Liver     Cancer Sister 57        Pancreatic - Cause of Death       Social History:    Marital Status:   [2]  Social History     Tobacco Use     Smoking status: Current Every Day Smoker     Packs/day: 2.00     Years: 30.00     Pack years: 60.00     Types: Cigarettes     Smokeless tobacco: Never Used     Tobacco comment: Tried to Quit (YES); Longest Tobacco Free (\"Cutdown\")   Substance Use Topics     Alcohol use: Yes     Comment: OCCASIONALLY     Drug use: No        Review of Systems   Neurological: Positive for headaches.   All other systems reviewed and are negative.        Physical Exam   Vitals:  /72 (BP Location: Left arm, Patient Position: Chair, Cuff Size: Adult Large)   Pulse 65   Temp 98  F (36.7  C) (Tympanic)   Ht 1.549 m (5' 1\")   Wt 59 kg (130 lb)   SpO2 97%   BMI 24.56 kg/m        Physical Exam    Post void residual: 96 mL    UA is pending    Cytology is pending    Impression: Urinary frequency and nocturia  Plan   Plan: It does not sound like Pushpa has any significant lower extremity edema or cardiovascular issues that could be contributing to this nocturia.  It " certainly is more common for patients to void more at night as they get older but this seems excessive.  She does have considerable frequency during the day 2.  We discussed treatment options such as cutting back on the coffee, I encouraged her to take something at bedtime because she does have some problems with sleep and so drinking some chamomile tea or taking a little Tylenol with Benadryl may help her sleep better and this may decrease the amount of time she is getting up.  We also discussed treatment with quick flicks when she has this urge to void and this can sometimes abort the spasm.  We also discussed medication as well as Botox and neuromodulation but at this point she is very happy to try some conservative things and will follow up in 3 to 4 months if no better.      No follow-ups on file.    Shayy Patiño MD  [facility] [place]              Again, thank you for allowing me to participate in the care of your patient.      Sincerely,    Shayy Patiño MD

## 2021-11-04 NOTE — PROGRESS NOTES
History     Chief Complaint:      Consult For (Urinary frequency mostly at night-Vannessa Anderson is referring)      HPI   Rita Fournier is a 66 year old female who presents for further evaluation of her urinary frequency and nocturia.  Pushpa was seen about a year and a half ago because of microscopic hematuria and that evaluation was negative.  She did have a slight distal urethral stricture from her lichen sclerosis which I had to pop through and dilate with my cystoscope.  She said after I did that her stream was much better and she still has a good stream.  She feels like she empties her bladder fine.  She is voiding about every 2 hours during the day but at night she is getting up 5 times.  Early in the night she will urinate a fair amount but then later on in the early morning she will have an urge but only urinate a small amount.  She drinks 3 cups of coffee a day in the morning no pop and rarely tea.  Vitamin water and plain water after that.  She has not had any further hematuria and no blood in the urine.  She does not experience any prolapse or vaginal bleeding.    Allergies:    Allergies   Allergen Reactions     Bacitracin      Codeine Nausea and Vomiting     cramping     Doxepin Unknown     face swelling     No Clinical Screening - See Comments Other (See Comments)     face swelling     Penicillins Hives     Provider wants to try Ancef(7/17/17).  Hives developed within 45 minutes.     Simvastatin Hives        Medications:      clobetasol (TEMOVATE) 0.05 % external cream  Cyanocobalamin (B-12) 1000 MCG TBCR  Lactobacillus (PROBIOTIC ACIDOPHILUS PO)  losartan (COZAAR) 50 MG tablet  metoprolol succinate ER (TOPROL-XL) 25 MG 24 hr tablet  triamcinolone (KENALOG) 0.5 % external ointment  vitamin D3 (CHOLECALCIFEROL) 50 mcg (2000 units) tablet        Problem List:      Patient Active Problem List    Diagnosis Date Noted     Psoriasis 10/01/2021     Priority: Medium     Microscopic hematuria--6/2020, 7/2020  "2020     Priority: Medium     Benign essential hypertension 2018     Priority: Medium     Hyperlipidemia LDL goal <100 2018     Priority: Medium     Migraine with aura and without status migrainosus, not intractable 2018     Priority: Medium     Vitamin D deficiency 2013     Priority: Medium     Dermatitis      Priority: Medium     Alopecia      Priority: Medium     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     Priority: Medium     Lichen sclerosus et atrophicus--VULVA , Clobetasol, exam--2020 10/17/2012     Priority: Medium     Other atopic dermatitis and related conditions 10/17/2012     Priority: Medium        Past Medical History:      Past Medical History:   Diagnosis Date     Alopecia      Benign essential hypertension 2018     Dermatitis      Gastroesophageal reflux disease without esophagitis 2018     Hyperlipidemia LDL goal < 100      Hyperlipidemia LDL goal <100 2018     Interstitial emphysema and related condition of  3/30/2021     Lump of breast, right 2017     Migraine with aura and without status migrainosus, not intractable 2018     Nipple discharge in female 2017     PONV (postoperative nausea and vomiting)      Psoriasis 10/1/2021     Taking a statin medication 2018     Tobacco abuse 2011     Vitamin D deficiency 2013       Past Surgical History:      Past Surgical History:   Procedure Laterality Date     BIOPSY Right 06/15/2017    US guided right breast needle biopsy     BIOPSY BREAST Bilateral 2017    Procedure: BIOPSY BREAST;  EXCISIONAL BIOPSY RIGHT BREAST MASS, EXCISION LEFT BREAST SKIN TAG;  Surgeon: Tianna Hanley MD;  Location: [facility]     BLADDER SURGERY      \"bladder tacked up\"     C ORAL SURGERY PROCEDURE  2018    Had rods put in to prep for implants      CHOLECYSTECTOMY  1975    Cholelithiasis     COLONOSCOPY N/A 2015    Procedure: COLONOSCOPY;  Surgeon: Talon, " "Tianna MARTINEZ MD;  Location: [facility]     facial redisection gland removal      LEFT > infected neck gland     HYSTERECTOMY TOTAL ABDOMINAL N/A 01/01/1981       Family History:      Family History   Problem Relation Age of Onset     Cancer Brother         Liver     Colon Cancer Brother      Cancer Brother         Pancreatic     Cancer Other         Family Hx     Diabetes Father      Cancer Father         Stomach     Heart Disease Father         Heart Disease     Heart Disease Mother         Heart Disease     Cancer Mother 73        Liver     Cancer Sister 57        Pancreatic - Cause of Death       Social History:    Marital Status:   [2]  Social History     Tobacco Use     Smoking status: Current Every Day Smoker     Packs/day: 2.00     Years: 30.00     Pack years: 60.00     Types: Cigarettes     Smokeless tobacco: Never Used     Tobacco comment: Tried to Quit (YES); Longest Tobacco Free (\"Cutdown\")   Substance Use Topics     Alcohol use: Yes     Comment: OCCASIONALLY     Drug use: No        Review of Systems   Neurological: Positive for headaches.   All other systems reviewed and are negative.        Physical Exam   Vitals:  /72 (BP Location: Left arm, Patient Position: Chair, Cuff Size: Adult Large)   Pulse 65   Temp 98  F (36.7  C) (Tympanic)   Ht 1.549 m (5' 1\")   Wt 59 kg (130 lb)   SpO2 97%   BMI 24.56 kg/m        Physical Exam    Post void residual: 96 mL    UA is pending    Cytology is pending    Impression: Urinary frequency and nocturia  Plan   Plan: It does not sound like Pushpa has any significant lower extremity edema or cardiovascular issues that could be contributing to this nocturia.  It certainly is more common for patients to void more at night as they get older but this seems excessive.  She does have considerable frequency during the day 2.  We discussed treatment options such as cutting back on the coffee, I encouraged her to take something at bedtime because she does have some " problems with sleep and so drinking some chamomile tea or taking a little Tylenol with Benadryl may help her sleep better and this may decrease the amount of time she is getting up.  We also discussed treatment with quick flicks when she has this urge to void and this can sometimes abort the spasm.  We also discussed medication as well as Botox and neuromodulation but at this point she is very happy to try some conservative things and will follow up in 3 to 4 months if no better.      No follow-ups on file.    Shayy Patiño MD  [facility] [place]

## 2021-11-05 LAB — BACTERIA UR CULT: NORMAL

## 2021-11-05 PROCEDURE — 88112 CYTOPATH CELL ENHANCE TECH: CPT | Mod: TC | Performed by: UROLOGY

## 2021-11-08 LAB
PATH REPORT.COMMENTS IMP SPEC: NORMAL
PATH REPORT.FINAL DX SPEC: NORMAL
PATH REPORT.GROSS SPEC: NORMAL
PATH REPORT.MICROSCOPIC SPEC OTHER STN: NORMAL
PATH REPORT.RELEVANT HX SPEC: NORMAL

## 2021-11-09 ENCOUNTER — HOSPITAL ENCOUNTER (OUTPATIENT)
Dept: CT IMAGING | Facility: HOSPITAL | Age: 66
Discharge: HOME | End: 2021-11-09
Attending: NURSE PRACTITIONER | Admitting: NURSE PRACTITIONER
Payer: MEDICARE

## 2021-11-09 DIAGNOSIS — Z87.891 PERSONAL HISTORY OF TOBACCO USE: ICD-10-CM

## 2021-11-09 PROCEDURE — 71250 CT THORAX DX C-: CPT

## 2021-11-09 NOTE — RESULT ENCOUNTER NOTE
Stable from prior - however 6 month Follow-up is recommended due to presence of nodules.      Vannessa Anderson -P  [phone]

## 2022-04-05 NOTE — PROGRESS NOTES
Assessment & Plan       Benign essential hypertension  - metoprolol succinate ER (TOPROL-XL) 25 MG 24 hr tablet; Take 1 tablet (25 mg) by mouth in the morning.  - losartan (COZAAR) 50 MG tablet; Take 1 tablet (50 mg) by mouth in the morning.  - Comprehensive metabolic panel  - TSH with free T4 reflex  - UA reflex to Microscopic and Culture - [facility]  - Urine Microscopic      Hyperlipidemia LDL goal <100  - Lipid Profile (Chol, Trig, HDL, LDL calc)  - Comprehensive metabolic panel      Vitamin D deficiency  - Vitamin D level  - D3 OTC      Migraine with aura and without status migrainosus, not intractable  - Continue plan of care      Pulmonary nodules  - Prof fee: Shared Decisionmaking for Lung Cancer Screening  - CT Chest Lung Cancer Scrn Low Dose wo; Future      Personal history of tobacco use  - Prof fee: Shared Decisionmaking for Lung Cancer Screening  - CT Chest Lung Cancer Scrn Low Dose wo; Future      Psoriasis  - triamcinolone (KENALOG) 0.5 % external ointment; Apply to affected areas BID      Colon cancer screening  - COLJONATHON([employer])      Encounter for screening mammogram for breast cancer  - MA Screen Bilateral w/Mohamud; Future        Return in about 6 months (around 10/11/2022).        Vannessa Anderson CNP  [facility] [place] is a 66 year old who presents for the following health issues         Hyperlipidemia Follow-Up    Are you regularly taking any medication or supplement to lower your cholesterol?   No - she is still contemplating her options.    Are you having muscle aches or other side effects that you think could be caused by your cholesterol lowering medication?  No        Hypertension Follow-up    Do you check your blood pressure regularly outside of the clinic? No    Are you following a low salt diet? Yes    Are your blood pressures ever more than 140 on the top number (systolic) OR more   than 90 on the bottom number (diastolic), for example 140/90?  "No (states that she will occasionally check her BP at home, not very often)        Migraine     Since your last clinic visit, how have your headaches changed?  No change    How often are you getting headaches or migraines? 8-9 a month     Are you able to do normal daily activities when you have a migraine? Yes    Are you taking rescue/relief medications? (Select all that apply) ibuprofen (Advil, Motrin) and Tylenol    How helpful is your rescue/relief medication?  I get total relief    Are you taking any medications to prevent migraines? (Select all that apply)  No    In the past 4 weeks, how often have you gone to urgent care or the emergency room because of your headaches?  0      Vitamin D deficiency  Lab due      History of Pulmonary nodules  6 month Follow-up low dose chest CT is due in May      Psoriasis   Stable        Patient Active Problem List   Diagnosis     Dermatitis     Alopecia     Vitamin D deficiency     Benign essential hypertension     Hyperlipidemia LDL goal <100     Migraine with aura and without status migrainosus, not intractable     CNH (chondrodermatitis nodularis helicis)     Lichen sclerosus et atrophicus--VULVA 2012, Clobetasol, exam--7/21/2020     Other atopic dermatitis and related conditions     Microscopic hematuria--6/2020, 7/2020     Psoriasis     Pulmonary nodules     Past Surgical History:   Procedure Laterality Date     BIOPSY Right 06/15/2017    US guided right breast needle biopsy     BIOPSY BREAST Bilateral 7/17/2017    Procedure: BIOPSY BREAST;  EXCISIONAL BIOPSY RIGHT BREAST MASS, EXCISION LEFT BREAST SKIN TAG;  Surgeon: Tianna Hanley MD;  [facility]: [facility]     BLADDER SURGERY  1981    \"bladder tacked up\"     CHOLECYSTECTOMY  1975    Cholelithiasis     COLONOSCOPY N/A 1/12/2015    Procedure: COLONOSCOPY;  Surgeon: Tianna Hanley MD;  Location: [facility]     facial redisection gland removal      LEFT > infected neck gland     HYSTERECTOMY TOTAL ABDOMINAL N/A 01/01/1981     " "[facility] ORAL SURGERY PROCEDURE  08/23/2018    Had rods put in to prep for implants        Social History     Tobacco Use     Smoking status: Current Every Day Smoker     Packs/day: 2.00     Years: 30.00     Pack years: 60.00     Types: Cigarettes     Smokeless tobacco: Never Used     Tobacco comment: Tried to Quit (YES); Longest Tobacco Free (\"Cutdown\")   Substance Use Topics     Alcohol use: Yes     Comment: OCCASIONALLY     Family History   Problem Relation Age of Onset     Cancer Brother         Liver     Colon Cancer Brother      Cancer Brother         Pancreatic     Cancer Other         Family Hx     Diabetes Father      Cancer Father         Stomach     Heart Disease Father         Heart Disease     Heart Disease Mother         Heart Disease     Cancer Mother 73        Liver     Cancer Sister 57        Pancreatic - Cause of Death           Current Outpatient Medications   Medication Sig Dispense Refill     losartan (COZAAR) 50 MG tablet Take 1 tablet (50 mg) by mouth in the morning. 90 tablet 1     metoprolol succinate ER (TOPROL-XL) 25 MG 24 hr tablet Take 1 tablet (25 mg) by mouth in the morning. 90 tablet 1     triamcinolone (KENALOG) 0.5 % external ointment Apply to affected areas BID 60 g 1     clobetasol (TEMOVATE) 0.05 % external cream Apply topically 2 times daily Takes for vaginal itching for her LSA per pt. 45 g 1     Cyanocobalamin (B-12) 1000 MCG TBCR        Lactobacillus (PROBIOTIC ACIDOPHILUS PO)        vitamin D3 (CHOLECALCIFEROL) 50 mcg (2000 units) tablet Take 1 tablet (50 mcg) by mouth daily 90 tablet 3         Allergies   Allergen Reactions     Bacitracin      Codeine Nausea and Vomiting     cramping     Doxepin Unknown     face swelling     No Clinical Screening - See Comments Other (See Comments)     face swelling     Penicillins Hives     Provider wants to try Ancef(7/17/17).  Hives developed within 45 minutes.     Simvastatin Hives         Recent Labs   Lab Test 10/01/21  1323 03/30/21  1415 " 11/02/20  1353 06/14/20  1846   * 163* 118*  --    HDL 54 53 55  --    TRIG 111 171* 153*  --    ALT 23 24  --  28   CR 0.73 0.48*  --  0.54   GFRESTIMATED 85 >90  --  >90   GFRESTBLACK  --  >90  --  >90   POTASSIUM 4.2 4.1  --  3.8   TSH  --  1.96 1.44  --           BP Readings from Last 3 Encounters:   04/11/22 124/64   11/04/21 128/72   10/01/21 132/70    Wt Readings from Last 3 Encounters:   11/04/21 59 kg (130 lb)   10/01/21 59 kg (130 lb)   03/30/21 59 kg (130 lb)                 Review of Systems   CONSTITUTIONAL: NEGATIVE for fever, chills, change in weight  INTEGUMENTARY/SKIN: NEGATIVE for worrisome rashes, moles or lesions  EYES: NEGATIVE for vision changes or irritation  ENT/MOUTH: NEGATIVE for ear, mouth and throat problems  RESP: NEGATIVE for significant cough or SOB  BREAST: NEGATIVE for masses, tenderness or discharge  CV: NEGATIVE for chest pain, palpitations or peripheral edema  GI: NEGATIVE for nausea, abdominal pain, heartburn, or change in bowel habits  : POSITIVE for urinary frequency - has seen urology for this, NEGATIVE for dysuria, or hematuria  MUSCULOSKELETAL: NEGATIVE for significant arthralgias or myalgia  NEURO: NEGATIVE for weakness, dizziness or paresthesias  ENDOCRINE: NEGATIVE for temperature intolerance, skin/hair changes  HEME: NEGATIVE for bleeding problems  PSYCHIATRIC: NEGATIVE for changes in mood or affect            Objective    /64 (BP Location: Left arm, Patient Position: Sitting, Cuff Size: Adult Regular)   Pulse 71   Temp 97.3  F (36.3  C) (Tympanic)   Resp 20   SpO2 92%   There is no height or weight on file to calculate BMI.         Physical Exam   GENERAL: healthy, alert and no distress  EYES: Eyes grossly normal to inspection, PERRL and conjunctivae and sclerae normal  HENT: ear canals and rightTM normal, left TM with opacity at 11 - 7 o'clock position, nose and mouth without ulcers or lesions  NECK: no adenopathy, no asymmetry, masses, or scars and  thyroid normal to palpation  RESP: lungs diminished in upper lobes, but clear to auscultation - no rales, rhonchi or wheezes  CV: regular rate and rhythm, normal S1 S2, no S3 or S4, no murmur, click or rub, no peripheral edema and peripheral pulses strong  ABDOMEN: soft, nontender, no hepatosplenomegaly, no masses and bowel sounds normal  MS: no gross musculoskeletal defects noted, no edema  NEURO: Normal strength and tone, mentation intact and speech normal  PSYCH: mentation appears normal, affect normal/bright            Lung Cancer Screening Shared Decision Making Visit     Rita Fournier is eligible for lung cancer screening on the basis of the information provided in my signed lung cancer screening order.     I have discussed with patient the risks and benefits of screening for lung cancer with low-dose CT.     The risks include:  radiation exposure: one low dose chest CT has as much ionizing radiation as about 15 chest x-rays or 6 months of background radiation living in [place]    false positives: 96% of positive findings/nodules are NOT cancer, but some might still require additional diagnostic evaluation, including biopsy  over-diagnosis: some slow growing cancers that might never have been clinically significant will be detected and treated unnecessarily     The benefit of early detection of lung cancer is contingent upon adherence to annual screening or more frequent follow up if indicated.     Furthermore, reaping the benefits of screening requires Rita Fournier to be willing and physically able to undergo diagnostic procedures, if indicated. Although no specific guide is available for determining severity of comorbidities, it is reasonable to withhold screening in patients who have greater mortality risk from other diseases.     We did discuss that the only way to prevent lung cancer is to not smoke. Smoking cessation counseling was given, duration < 3 minutes.      I did offer risk  estimation using a calculator such as this one:            Patient is seen in conjunction with NP student.  History is reviewed with patient and pertinent portions of the exam are repeated.  Assessment and plan is reviewed with the patient.

## 2022-04-11 ENCOUNTER — OFFICE VISIT (OUTPATIENT)
Dept: FAMILY MEDICINE | Facility: OTHER | Age: 67
End: 2022-04-11
Attending: NURSE PRACTITIONER
Payer: MEDICARE

## 2022-04-11 VITALS
TEMPERATURE: 97.3 F | HEART RATE: 71 BPM | DIASTOLIC BLOOD PRESSURE: 64 MMHG | OXYGEN SATURATION: 92 % | SYSTOLIC BLOOD PRESSURE: 124 MMHG | RESPIRATION RATE: 20 BRPM

## 2022-04-11 DIAGNOSIS — I10 BENIGN ESSENTIAL HYPERTENSION: Primary | ICD-10-CM

## 2022-04-11 DIAGNOSIS — Z12.31 ENCOUNTER FOR SCREENING MAMMOGRAM FOR BREAST CANCER: ICD-10-CM

## 2022-04-11 DIAGNOSIS — R91.8 PULMONARY NODULES: ICD-10-CM

## 2022-04-11 DIAGNOSIS — Z12.11 COLON CANCER SCREENING: ICD-10-CM

## 2022-04-11 DIAGNOSIS — L40.9 PSORIASIS: ICD-10-CM

## 2022-04-11 DIAGNOSIS — E78.5 HYPERLIPIDEMIA LDL GOAL <100: ICD-10-CM

## 2022-04-11 DIAGNOSIS — G43.109 MIGRAINE WITH AURA AND WITHOUT STATUS MIGRAINOSUS, NOT INTRACTABLE: ICD-10-CM

## 2022-04-11 DIAGNOSIS — E55.9 VITAMIN D DEFICIENCY: ICD-10-CM

## 2022-04-11 DIAGNOSIS — Z87.891 PERSONAL HISTORY OF TOBACCO USE: ICD-10-CM

## 2022-04-11 LAB
ALBUMIN SERPL-MCNC: 3.8 G/DL (ref 3.4–5)
ALBUMIN UR-MCNC: NEGATIVE MG/DL
ALP SERPL-CCNC: 60 U/L (ref 40–150)
ALT SERPL W P-5'-P-CCNC: 22 U/L (ref 0–50)
ANION GAP SERPL CALCULATED.3IONS-SCNC: 5 MMOL/L (ref 3–14)
APPEARANCE UR: CLEAR
AST SERPL W P-5'-P-CCNC: 13 U/L (ref 0–45)
BACTERIA #/AREA URNS HPF: ABNORMAL /HPF
BILIRUB SERPL-MCNC: 0.5 MG/DL (ref 0.2–1.3)
BILIRUB UR QL STRIP: NEGATIVE
BUN SERPL-MCNC: 16 MG/DL (ref 7–30)
CALCIUM SERPL-MCNC: 9.2 MG/DL (ref 8.5–10.1)
CHLORIDE BLD-SCNC: 106 MMOL/L (ref 94–109)
CHOLEST SERPL-MCNC: 218 MG/DL
CO2 SERPL-SCNC: 26 MMOL/L (ref 20–32)
COLOR UR AUTO: YELLOW
CREAT SERPL-MCNC: 0.63 MG/DL (ref 0.52–1.04)
FASTING STATUS PATIENT QL REPORTED: YES
GFR SERPL CREATININE-BSD FRML MDRD: >90 ML/MIN/1.73M2
GLUCOSE BLD-MCNC: 99 MG/DL (ref 70–99)
GLUCOSE UR STRIP-MCNC: NEGATIVE MG/DL
HDLC SERPL-MCNC: 50 MG/DL
HGB UR QL STRIP: ABNORMAL
HOLD SPECIMEN: NORMAL
KETONES UR STRIP-MCNC: NEGATIVE MG/DL
LDLC SERPL CALC-MCNC: 144 MG/DL
LEUKOCYTE ESTERASE UR QL STRIP: ABNORMAL
NITRATE UR QL: NEGATIVE
NONHDLC SERPL-MCNC: 168 MG/DL
PH UR STRIP: 5 [PH] (ref 5–7)
POTASSIUM BLD-SCNC: 3.9 MMOL/L (ref 3.4–5.3)
PROT SERPL-MCNC: 7.5 G/DL (ref 6.8–8.8)
RBC #/AREA URNS AUTO: ABNORMAL /HPF
SODIUM SERPL-SCNC: 137 MMOL/L (ref 133–144)
SP GR UR STRIP: 1.02 (ref 1–1.03)
SQUAMOUS #/AREA URNS AUTO: ABNORMAL /LPF
TRIGL SERPL-MCNC: 122 MG/DL
TSH SERPL DL<=0.005 MIU/L-ACNC: 1.91 MU/L (ref 0.4–4)
UROBILINOGEN UR STRIP-ACNC: 0.2 E.U./DL
WBC #/AREA URNS AUTO: ABNORMAL /HPF

## 2022-04-11 PROCEDURE — 80053 COMPREHEN METABOLIC PANEL: CPT | Mod: ZL | Performed by: NURSE PRACTITIONER

## 2022-04-11 PROCEDURE — G0463 HOSPITAL OUTPT CLINIC VISIT: HCPCS | Mod: 25

## 2022-04-11 PROCEDURE — 81003 URINALYSIS AUTO W/O SCOPE: CPT | Mod: ZL | Performed by: NURSE PRACTITIONER

## 2022-04-11 PROCEDURE — G0463 HOSPITAL OUTPT CLINIC VISIT: HCPCS

## 2022-04-11 PROCEDURE — 36415 COLL VENOUS BLD VENIPUNCTURE: CPT | Mod: ZL | Performed by: NURSE PRACTITIONER

## 2022-04-11 PROCEDURE — 80061 LIPID PANEL: CPT | Mod: ZL | Performed by: NURSE PRACTITIONER

## 2022-04-11 PROCEDURE — 81001 URINALYSIS AUTO W/SCOPE: CPT | Mod: ZL | Performed by: NURSE PRACTITIONER

## 2022-04-11 PROCEDURE — G0296 VISIT TO DETERM LDCT ELIG: HCPCS | Performed by: NURSE PRACTITIONER

## 2022-04-11 PROCEDURE — 82306 VITAMIN D 25 HYDROXY: CPT | Mod: ZL | Performed by: NURSE PRACTITIONER

## 2022-04-11 PROCEDURE — 99214 OFFICE O/P EST MOD 30 MIN: CPT | Performed by: NURSE PRACTITIONER

## 2022-04-11 PROCEDURE — 84443 ASSAY THYROID STIM HORMONE: CPT | Mod: ZL | Performed by: NURSE PRACTITIONER

## 2022-04-11 RX ORDER — LOSARTAN POTASSIUM 50 MG/1
50 TABLET ORAL DAILY
Qty: 90 TABLET | Refills: 1 | Status: SHIPPED | OUTPATIENT
Start: 2022-04-11 | End: 2022-10-21

## 2022-04-11 RX ORDER — TRIAMCINOLONE ACETONIDE 5 MG/G
OINTMENT TOPICAL
Qty: 60 G | Refills: 1 | Status: SHIPPED | OUTPATIENT
Start: 2022-04-11

## 2022-04-11 RX ORDER — METOPROLOL SUCCINATE 25 MG/1
25 TABLET, EXTENDED RELEASE ORAL DAILY
Qty: 90 TABLET | Refills: 1 | Status: SHIPPED | OUTPATIENT
Start: 2022-04-11 | End: 2022-10-21

## 2022-04-11 ASSESSMENT — PATIENT HEALTH QUESTIONNAIRE - PHQ9: SUM OF ALL RESPONSES TO PHQ QUESTIONS 1-9: 0

## 2022-04-11 ASSESSMENT — ANXIETY QUESTIONNAIRES
5. BEING SO RESTLESS THAT IT IS HARD TO SIT STILL: NOT AT ALL
7. FEELING AFRAID AS IF SOMETHING AWFUL MIGHT HAPPEN: NOT AT ALL
6. BECOMING EASILY ANNOYED OR IRRITABLE: NOT AT ALL
GAD7 TOTAL SCORE: 0
4. TROUBLE RELAXING: NOT AT ALL
1. FEELING NERVOUS, ANXIOUS, OR ON EDGE: NOT AT ALL
3. WORRYING TOO MUCH ABOUT DIFFERENT THINGS: NOT AT ALL
2. NOT BEING ABLE TO STOP OR CONTROL WORRYING: NOT AT ALL

## 2022-04-11 ASSESSMENT — PAIN SCALES - GENERAL: PAINLEVEL: NO PAIN (0)

## 2022-04-11 NOTE — NURSING NOTE
"Chief Complaint   Patient presents with     Chronic Disease Management       Initial /64 (BP Location: Left arm, Patient Position: Sitting, Cuff Size: Adult Regular)   Pulse 71   Temp 97.3  F (36.3  C) (Tympanic)   Resp 20   SpO2 92%  Estimated body mass index is 24.56 kg/m  as calculated from the following:    Height as of 11/4/21: 1.549 m (5' 1\").    Weight as of 11/4/21: 59 kg (130 lb).  Medication Reconciliation: complete  Nakita Moseley LPN  "

## 2022-04-11 NOTE — PATIENT INSTRUCTIONS
Assessment & Plan         Benign essential hypertension  - metoprolol succinate ER (TOPROL-XL) 25 MG 24 hr tablet; Take 1 tablet (25 mg) by mouth in the morning.  - losartan (COZAAR) 50 MG tablet; Take 1 tablet (50 mg) by mouth in the morning.  - Comprehensive metabolic panel  - TSH with free T4 reflex  - UA reflex to Microscopic and Culture - [facility]  - Urine Microscopic      Hyperlipidemia LDL goal <100  - Lipid Profile (Chol, Trig, HDL, LDL calc)  - Comprehensive metabolic panel      Vitamin D deficiency  - Vitamin D level  - D3 OTC      Migraine with aura and without status migrainosus, not intractable  - Continue plan of care      Pulmonary nodules  - Prof fee: [facility] Decisionmaking for Lung Cancer Screening  - CT Chest Lung Cancer Scrn Low Dose wo; Future      Personal history of tobacco use  - Prof fee: [facility] Decisionmaking for Lung Cancer Screening  - CT Chest Lung Cancer Scrn Low Dose wo; Future      Psoriasis  - triamcinolone (KENALOG) 0.5 % external ointment; Apply to affected areas BID      Colon cancer screening  - COLOGJOSE([employer])      Encounter for screening mammogram for breast cancer  - MA Screen Bilateral w/Mohamud; Future        Return in about 6 months (around 10/11/2022).        Vannessa Anderson CNP  [facility]      Lung Cancer Screening   Frequently Asked Questions  If you are at high-risk for lung cancer, getting screened with low-dose computed tomography (LDCT) every year can help save your life. This handout offers answers to some of the most common questions about lung cancer screening. If you have other questions, please call [phone] ([phone]).     What is it?  Lung cancer screening uses special X-ray technology to create an image of your lung tissue. The exam is quick and easy and takes less than 10 seconds. We don t give you any medicine or use any needles. You can eat before and after the exam. You don t need to change your clothes as  long as the clothing on your chest doesn t contain metal. But, you do need to be able to hold your breath for at least 6 seconds during the exam.    What is the goal of lung cancer screening?  The goal of lung cancer screening is to save lives. Many times, lung cancer is not found until a person starts having physical symptoms. Lung cancer screening can help detect lung cancer in the earliest stages when it may be easier to treat.    Who should be screened for lung cancer?  We suggest lung cancer screening for anyone who is at high-risk for lung cancer. You are in the high-risk group if you:     are between the ages of 55 and 79, and   have smoked at least 1 pack of cigarettes a day for 20 or more years, and   still smoke or have quit within the past 15 years.    However, if you have a new cough or shortness of breath, you should talk to your doctor before being screened.    Why does it matter if I have symptoms?  Certain symptoms can be a sign that you have a condition in your lungs that should be checked and treated by your doctor. These symptoms include fever, chest pain, a new or changing cough, shortness of breath that you have never felt before, coughing up blood or unexplained weight loss. Having any of these symptoms can greatly affect the results of lung cancer screening.       Should all smokers get an LDCT lung cancer screening exam?  It depends. Lung cancer screening is for a very specific group of men and women who have a history of heavy smoking over a long period of time (see  Who should be screened for lung cancer  above).  I am in the high-risk group, but have been diagnosed with cancer in the past. Is LDCT lung cancer screening right for me?  In some cases, you should not have LDCT lung screening, such as when your doctor is already following your cancer with CT scan studies. Your doctor will help you decide if LDCT lung screening is right for you.  Do I need to have a screening exam every  year?  Yes. If you are in the high-risk group described earlier, you should get an LDCT lung cancer screening exam every year until you are 79, or are no longer willing or able to undergo screening and possible procedures to diagnose and treat lung cancer.  How effective is LDCT at preventing death from lung cancer?  Studies have shown that LDCT lung cancer screening can lower the risk of death from lung cancer by 20 percent in people who are at high-risk.  What are the risks?  There are some risks and limitations of LDCT lung cancer screening. We want to make sure you understand the risks and benefits, so please let us know if you have any questions. Your doctor may want to talk with you more about these risks.   Radiation exposure: As with any exam that uses radiation, there is a very small increased risk of cancer. The amount of radiation in LDCT is small--about the same amount a person would get from a mammogram. Your doctor orders the exam when he or she feels the potential benefits outweigh the risks.   False negatives: No test is perfect, including LDCT. It is possible that you may have a medical condition, including lung cancer, that is not found during your exam. This is called a false negative result.   False positives and more testing: LDCT very often finds something in the lung that could be cancer, but in fact is not. This is called a false positive result. False positive tests often cause anxiety. To make sure these findings are not cancer, you may need to have more tests. These tests will be done only if you give us permission. Sometimes patients need a treatment that can have side effects, such as a biopsy. For more information on false positives, see  What can I expect from the results?    Findings not related to lung cancer: Your LDCT exam also takes pictures of areas of your body next to your lungs. In a very small number of cases, the CT scan will show an abnormal finding in one of these areas,  such as your kidneys, adrenal glands, liver or thyroid. This finding may not be serious, but you may need more tests. Your doctor can help you decide what other tests you may need, if any.  What can I expect from the results?  About 1 out of 4 LDCT exams will find something that may need more tests. Most of the time, these findings are lung nodules. Lung nodules are very small collections of tissue in the lung. These nodules are very common, and the vast majority--more than 97 percent--are not cancer (benign). Most are normal lymph nodes or small areas of scarring from past infections.  But, if a small lung nodule is found to be cancer, the cancer can be cured more than 90 percent of the time. To know if the nodule is cancer, we may need to get more images before your next yearly screening exam. If the nodule has suspicious features (for example, it is large, has an odd shape or grows over time), we will refer you to a specialist for further testing.  Will my doctor also get the results?  Yes. Your doctor will get a copy of your results.  Is it okay to keep smoking now that there s a cancer screening exam?  No. Tobacco is one of the strongest cancer-causing agents. It causes not only lung cancer, but other cancers and cardiovascular (heart) diseases as well. The damage caused by smoking builds over time. This means that the longer you smoke, the higher your risk of disease. While it is never too late to quit, the sooner you quit, the better.  Where can I find help to quit smoking?  The best way to prevent lung cancer is to stop smoking. If you have already quit smoking, congratulations and keep it up! For help on quitting smoking, please call [employer] at 2-859-QUITNOW (1-296.601.4323) or the American [employer] at 1-780.570.4228 to find local resources near you.  One-on-one health coaching:  If you d prefer to work individually with a health care provider on tobacco cessation, we offer:     Medication  Therapy Management:  Our specially trained pharmacists work closely with you and your doctor to help you quit smoking.  Call [phone] or [phone] (toll free).

## 2022-04-12 ASSESSMENT — ANXIETY QUESTIONNAIRES: GAD7 TOTAL SCORE: 0

## 2022-04-13 DIAGNOSIS — E78.5 HYPERLIPIDEMIA LDL GOAL <100: Primary | ICD-10-CM

## 2022-04-13 LAB — DEPRECATED CALCIDIOL+CALCIFEROL SERPL-MC: 53 UG/L (ref 20–75)

## 2022-04-13 RX ORDER — ATORVASTATIN CALCIUM 20 MG/1
20 TABLET, FILM COATED ORAL DAILY
Qty: 90 TABLET | Refills: 1 | Status: SHIPPED | OUTPATIENT
Start: 2022-04-13 | End: 2022-10-10

## 2022-05-19 ENCOUNTER — TELEPHONE (OUTPATIENT)
Dept: MAMMOGRAPHY | Facility: OTHER | Age: 67
End: 2022-05-19
Payer: COMMERCIAL

## 2022-05-19 ENCOUNTER — HOSPITAL ENCOUNTER (OUTPATIENT)
Dept: CT IMAGING | Facility: HOSPITAL | Age: 67
Discharge: HOME | End: 2022-05-19
Attending: NURSE PRACTITIONER
Payer: MEDICARE

## 2022-05-19 ENCOUNTER — ANCILLARY PROCEDURE (OUTPATIENT)
Dept: MAMMOGRAPHY | Facility: OTHER | Age: 67
End: 2022-05-19
Attending: NURSE PRACTITIONER
Payer: MEDICARE

## 2022-05-19 DIAGNOSIS — Z87.891 PERSONAL HISTORY OF TOBACCO USE: ICD-10-CM

## 2022-05-19 DIAGNOSIS — R91.8 PULMONARY NODULES: ICD-10-CM

## 2022-05-19 DIAGNOSIS — Z12.31 ENCOUNTER FOR SCREENING MAMMOGRAM FOR BREAST CANCER: ICD-10-CM

## 2022-05-19 PROCEDURE — 77067 SCR MAMMO BI INCL CAD: CPT | Mod: TC

## 2022-05-19 PROCEDURE — 71250 CT THORAX DX C-: CPT | Mod: ME

## 2022-10-10 ENCOUNTER — TELEPHONE (OUTPATIENT)
Dept: PHARMACY | Facility: PHYSICIAN GROUP | Age: 67
End: 2022-10-10

## 2022-10-10 ENCOUNTER — TELEPHONE (OUTPATIENT)
Dept: FAMILY MEDICINE | Facility: OTHER | Age: 67
End: 2022-10-10

## 2022-10-10 ENCOUNTER — VIRTUAL VISIT (OUTPATIENT)
Dept: FAMILY MEDICINE | Facility: OTHER | Age: 67
End: 2022-10-10
Attending: NURSE PRACTITIONER
Payer: COMMERCIAL

## 2022-10-10 DIAGNOSIS — U07.1 INFECTION DUE TO 2019 NOVEL CORONAVIRUS: Primary | ICD-10-CM

## 2022-10-10 PROCEDURE — 99213 OFFICE O/P EST LOW 20 MIN: CPT | Mod: 95 | Performed by: NURSE PRACTITIONER

## 2022-10-10 NOTE — TELEPHONE ENCOUNTER
"Call placed to patient to discuss positive covid 19 results.     Underlying Medical Conditions: HTN    Patient testing positive on 10/10     Test by:  At home covid test    Start of Symptoms: 10/8    Symptoms to include: body aches, sore throat, headache, cough, fever(100.8) runny nose    Covid 19 Vaccination Status:  Pfizer initial X2 and pfizer booster X1    Are you pregnant, breastfeeding or trying to conceive? No     Symptom Management:  tylenol    Appointment Scheduled:  10/10 330 with Geni Lee    [place] [facility] [place]    Confirmed with Pharmacy: Paxlovid & Molnupiravir in stock    Relayed instructions below. Patient relayed understanding. No further concerns with Quarantine guidelines.     Instructions for Patients  Your COVID-19 test was positive. This means you have the virus. Please follow the \"How can I take care of myself\" and \"How do I self-isolate?\" guidelines in these instructions.     What treatments are available?  Over-the-counter medicines may help with your symptoms such as runny or stuffy nose, cough, chills, and fever. Talk to your care team about your options.      Some people are at high risk for severe illness (for example if you have a weak immune system, you're 65 or older, or you have certain medical problems). If your risk it high and your symptoms started in the last 5 to 7 days, we strongly recommend for you to get COVID treatment as soon as possible before you get really sick. Paxlovid, Molnupiravir and the monoclonal antibody treatments are proven safe and effective, make you feel better faster, and prevent hospitalization and death.           What are the symptoms of COVID-19?  Symptoms can include fever, cough, shortness of breath, chills, headache, muscle pain sore throat, fatigue, runny or stuffy nose, and loss of taste and smell. Other less common symptoms include nausea, vomiting, or diarrhea (watery stools).     Know when to call 911. Emergency warning " signs include:    Trouble breathing or shortness of breath    Pain or pressure in the chest that doesn't go away    Feeling confused like you haven't felt before, or not being able to wake up    Bluish-colored lips or face     How can I take care of myself?  1. Get lots of rest. Drink extra fluids (unless a doctor has told you not to).  2. Take Tylenol (acetaminophen) for fever or pain. If you have liver or kidney problems, ask your family doctor if it's okay to take Tylenol              Adults:              650 mg (two 325 mg pills or tablets) every 4 to 6 hours, or...              1,000 mg (two 500 mg pills or tablets) every 8 hours as needed.  Note: Don't take more than 3,000 mg in one day. Acetaminophen is found in many medicines (both prescribed and over the counter). Read all labels to be sure you don't take too much.  For children, check the Tylenol bottle for the right dose. The dose is based on the child's age or weight.  3. Take over the counter medicines for your symptoms as needed. Talk to your pharmacist.  4. If you have other health problems (like cancer, heart failure, an organ transplant, or severe kidney disease): Call your specialty clinic if you don't feel better in the next 2 days.     These guidelines are for isolating and quarantining before returning to work, school or .     For employers, schools and day cares: This is an official notice for this person's medical guidelines for returning in-person.     For health care sites: The CDC gives different isolation and quarantine guidelines for healthcare sites, please check with these sites before arriving.      How do I self-isolate?  You isolate when you have symptoms of COVID or a test shows you have COVID, even if you don't have symptoms.   1. If you DO have symptoms:  ? Stay home and away from others  ? For at least 5 days after your symptoms started, AND   ? You are fever free for 24 hours (with no medicine that reduces fever),  AND  ? Your other symptoms are better.  ? Wear a mask for 10 full days any time you are around others.  2. If you DON'T have symptoms:  ? Stay at home and away from others for at least 5 days after your positive test.  ? Wear a mask for 10 full days any time you are around others.     How and when do I quarantine?  You quarantine when you may have been exposed to the virus and DON'T have symptoms.   1. Stay home and away from others.   2. You must quarantine for 5 days after your last contact with a person who has COVID.  ? Note: If you are fully vaccinated, you don't need to quarantine. You should still follow the steps below.   3. Wear a mask for 10 full days any time you're around others.  4. Get tested at least 5 days after you were exposed, even if you don't have symptoms.   5. If you start to have symptoms, isolate right away and get tested.     Where can I get more information?    [facility] COVID-19 Resource Hub: [email or web address]     CDC Quarantine & Isolation: https://www.cdc.gov/coronavirus/2019-ncov/your-health/quarantine-isolation.html     CDC - What to Do If You're Sick: https://www.cdc.gov/coronavirus/2019-ncov/if-you-are-sick/index.html    [facility] clinical trials (COVID-19 research studies): [email or web address]    [place] Department of Health COVID-19 Public Hotline: 1-508.930.8244

## 2022-10-10 NOTE — PROGRESS NOTES
Pushpa is a 67 year old who is being evaluated via a billable telephone visit.      What phone number would you like to be contacted at? [phone]  How would you like to obtain your AVS? Mail a copy    Assessment & Plan     Infection due to 2019 novel coronavirus  Please see the CDC and/or the [place] Dept of Health websites for additional information about COVID and/or ANTIVIRAL therapy.   New medication education completed with potential risks, benefits, administration, and potential side effects.   Will not use triamcinolone topical during treatment unless needed. Will check blood pressure with any symptoms of hypo- or hypertension (reviewed with Pushpa).  - nirmatrelvir and ritonavir (PAXLOVID) therapy pack; Take 3 tablets by mouth 2 times daily for 5 days (Take 2 Nirmatrelvir tablets and 1 Ritonavir tablet twice daily for 5 days)    Prescription drug management  No LOS data to display   Time spent doing chart review, history and exam, documentation and further activities per the note     Nicotine/Tobacco Cessation:  She reports that she has been smoking cigarettes. She has a 60.00 pack-year smoking history. She has never used smokeless tobacco.  Nicotine/Tobacco Cessation Plan:   Information offered: Patient not interested at this time        No follow-ups on file.    Geni Lee, CNP  [facility]    Subjective   Pushpa is a 67 year old, presenting for the following health issues:  Covid Concern      HPI     Underlying Medical Conditions: HTN    Patient testing positive on 10/10     Test by:  At home covid test    Start of Symptoms: 10/8    Symptoms to include: body aches, sore throat, headache, cough, fever(100.8) runny nose    Covid 19 Vaccination Status:  Pfizer initial X2 and pfizer booster X1    Are you pregnant, breastfeeding or trying to conceive? No     Symptom Management:  tylenol    [facility]: [facility]    Confirmed with Pharmacy: Paxlovid & Molnupiravir in  stock    Last Comprehensive Metabolic Panel:  Sodium   Date Value Ref Range Status   04/11/2022 137 133 - 144 mmol/L Final   03/30/2021 136 133 - 144 mmol/L Final     Potassium   Date Value Ref Range Status   04/11/2022 3.9 3.4 - 5.3 mmol/L Final   03/30/2021 4.1 3.4 - 5.3 mmol/L Final     Chloride   Date Value Ref Range Status   04/11/2022 106 94 - 109 mmol/L Final   03/30/2021 103 94 - 109 mmol/L Final     Carbon Dioxide   Date Value Ref Range Status   03/30/2021 26 20 - 32 mmol/L Final     Carbon Dioxide (CO2)   Date Value Ref Range Status   04/11/2022 26 20 - 32 mmol/L Final     Anion Gap   Date Value Ref Range Status   04/11/2022 5 3 - 14 mmol/L Final   03/30/2021 7 3 - 14 mmol/L Final     Glucose   Date Value Ref Range Status   04/11/2022 99 70 - 99 mg/dL Final   03/30/2021 89 70 - 99 mg/dL Final     Comment:     Non Fasting     Urea Nitrogen   Date Value Ref Range Status   04/11/2022 16 7 - 30 mg/dL Final   03/30/2021 13 7 - 30 mg/dL Final     Creatinine   Date Value Ref Range Status   04/11/2022 0.63 0.52 - 1.04 mg/dL Final   03/30/2021 0.48 (L) 0.52 - 1.04 mg/dL Final     GFR Estimate   Date Value Ref Range Status   04/11/2022 >90 >60 mL/min/1.73m2 Final     Comment:     Effective December 21, 2021 eGFRcr in adults is calculated using the 2021 CKD-EPI creatinine equation which includes age and gender (Sundeep et al., NEJM, DOI: 10.1056/GBCLyw4934734)   03/30/2021 >90 >60 mL/min/[1.73_m2] Final     Comment:     Starting 12/18/2018, serum creatinine based estimated GFR (eGFR) will be   calculated using the Chronic Kidney Disease Epidemiology Collaboration   (CKD-EPI) equation.       Calcium   Date Value Ref Range Status   04/11/2022 9.2 8.5 - 10.1 mg/dL Final   03/30/2021 9.4 8.5 - 10.1 mg/dL Final     Liver Function Studies - Recent Labs   Lab Test 04/11/22  1328   PROTTOTAL 7.5   ALBUMIN 3.8   BILITOTAL 0.5   ALKPHOS 60   AST 13   ALT 22     Current Outpatient Medications   Medication     clobetasol  "(TEMOVATE) 0.05 % external cream     Lactobacillus (PROBIOTIC ACIDOPHILUS PO)     losartan (COZAAR) 50 MG tablet     metoprolol succinate ER (TOPROL-XL) 25 MG 24 hr tablet     triamcinolone (KENALOG) 0.5 % external ointment     vitamin D3 (CHOLECALCIFEROL) 50 mcg (2000 units) tablet     No current facility-administered medications for this visit.     Jr Hart, [facility]     2:03 PM  Note  Paxlovid drug-drug interaction check:      1. Triamcinolone and Paxlovid. Paxlovid can increase the serum concentration of certain corticosteroids (including triamcinolone) from all routes of administration. According to the [place] \"Management of Paxlovid Drug-Drug Interactions\" document and the Paxlovid prescribing information, concomitant use of Paxlovid and triamcinolone results in an increased risk for Cushing's syndrome and adrenal suppression, and alternate corticosteroids such as beclomethasone and prednisolone should be considered. However, the Paxlovid prescribing information states that the risk of Cushing's syndrome and adrenal suppression associated with short-term use of a strong CY inhibitor is considered to be low. With this product being used topically, it is unclear how much systemic absorption is truly occurring (likely, a small amount). The patient may benefit from holding this medication if it is not needed while taking Paxlovid. This effect is expected to last for the entire course of Paxlovid and for 3 days after the completion of the Paxlovid course (8 days total).      2. Losartan and Paxlovid. Paxlovid may potentially increase the serum concentration of losartan's more pharmacologically active metabolite through induction of CYP2C9. The [place] COVID-19 interaction  states that this is unlikely to be a clinically significant interaction, and no dose adjustment of losartan is needed. The patient may benefit from clinically monitoring blood pressure at home to ensure that hypotension is " not occurring.      3. Metoprolol and Paxlovid. Paxlovid can increase the serum concentration of metoprolol moderately through inhibition of CYP2D6. This effect is not expected to be clinically relevant, but the patient may benefit from monitoring blood pressure and heart rate as able to ensure that hypotension and bradycardia are not occurring. No dose adjustment of metoprolol is necessary prior to starting Paxlovid.      According to the medication list, the patient stopped taking atorvastatin. Please verify that this is the case. If the patient is still taking atorvastatin, there is a medication interaction present with Paxlovid.     Thank you!      Jr Hart, PharmD  Medication Therapy Management Pharmacist  [facility]  Office phone: [phone]            COVID-19 Symptom Review  How many days ago did these symptoms start? 10/8/22    Are any of the following symptoms significant for you?    New or worsening difficulty breathing? No    Worsening cough? Yes, I am coughing up mucus.    Fever or chills? Yes, the highest temperature was 100.8    Headache: YES    Sore throat: YES    Chest pain: No    Diarrhea: YES    Body aches? YES    What treatments has patient tried? Acetaminophen   Does patient live in a nursing home, group home, or shelter? No  Does patient have a way to get food/medications during quarantined? Yes, I have a friend or family member who can help me.      Review of Systems   Constitutional, HEENT, cardiovascular, pulmonary, gi and gu systems are negative, except as otherwise noted.      Objective       Vitals:  No vitals were obtained today due to virtual visit.    Physical Exam   healthy, alert and no distress  PSYCH: Alert and oriented times 3; coherent speech, normal   rate and volume, able to articulate logical thoughts, able   to abstract reason, no tangential thoughts, no hallucinations   or delusions  Her affect is normal  RESP: Occasional dry cough, no audible wheezing,  able to talk in full sentences  Remainder of exam unable to be completed due to telephone visits          Phone call duration: 7 minutes

## 2022-10-10 NOTE — TELEPHONE ENCOUNTER
"Paxlovid drug-drug interaction check:     1. Triamcinolone and Paxlovid. Paxlovid can increase the serum concentration of certain corticosteroids (including triamcinolone) from all routes of administration. According to the [place] \"Management of Paxlovid Drug-Drug Interactions\" document and the Paxlovid prescribing information, concomitant use of Paxlovid and triamcinolone results in an increased risk for Cushing's syndrome and adrenal suppression, and alternate corticosteroids such as beclomethasone and prednisolone should be considered. However, the Paxlovid prescribing information states that the risk of Cushing's syndrome and adrenal suppression associated with short-term use of a strong CY inhibitor is considered to be low. With this product being used topically, it is unclear how much systemic absorption is truly occurring (likely, a small amount). The patient may benefit from holding this medication if it is not needed while taking Paxlovid. This effect is expected to last for the entire course of Paxlovid and for 3 days after the completion of the Paxlovid course (8 days total).     2. Losartan and Paxlovid. Paxlovid may potentially increase the serum concentration of losartan's more pharmacologically active metabolite through induction of CYP2C9. The [place] COVID-19 interaction  states that this is unlikely to be a clinically significant interaction, and no dose adjustment of losartan is needed. The patient may benefit from clinically monitoring blood pressure at home to ensure that hypotension is not occurring.     3. Metoprolol and Paxlovid. Paxlovid can increase the serum concentration of metoprolol moderately through inhibition of CYP2D6. This effect is not expected to be clinically relevant, but the patient may benefit from monitoring blood pressure and heart rate as able to ensure that hypotension and bradycardia are not occurring. No dose adjustment of metoprolol is necessary prior to " starting Paxlovid.     According to the medication list, the patient stopped taking atorvastatin. Please verify that this is the case. If the patient is still taking atorvastatin, there is a medication interaction present with Paxlovid.    Thank you!     Jr Hart, PharmD  Medication Therapy Management Pharmacist  [facility]  Office phone: [phone]

## 2022-10-20 NOTE — PROGRESS NOTES
Assessment & Plan         1. Migraine with aura and without status migrainosus, not intractable  - Follow-up as needed        2. Hyperlipidemia LDL goal <100  - Lipid Profile (Chol, Trig, HDL, LDL calc); Future  - UA reflex to Microscopic and Culture - [facility]; Future  - Comprehensive metabolic panel; Future  - TSH with free T4 reflex; Future        3. Benign essential hypertension  - metoprolol succinate ER (TOPROL XL) 25 MG 24 hr tablet; Take 1 tablet (25 mg) by mouth daily  Dispense: 90 tablet; Refill: 1  - losartan (COZAAR) 50 MG tablet; Take 1 tablet (50 mg) by mouth daily  Dispense: 90 tablet; Refill: 1  - Lipid Profile (Chol, Trig, HDL, LDL calc); Future  - *UA reflex to Microscopic and Culture - [facility]; Future  - Comprehensive metabolic panel; Future  - TSH with free T4 reflex; Future        4. Need for prophylactic vaccination and inoculation against influenza  - INFLUENZA, QUAD, HIGH DOSE, PF, 65YR + (FLUZONE HD)  - ADMIN INFLUENZA (For MEDICARE Patients ONLY) []        5. Need for vaccination  - Flu shot        6. Vitamin D deficiency  - D3 5000 U daily        7. Psoriasis  - Follow-up as needed        8. Urinary frequency  - Follow-up as needed        9. Menopause present  - DX Hip/Pelvis/Spine; Future        10. Osteopenia of multiple sites  - DX Hip/Pelvis/Spine; Future        Return in about 6 months (around 4/21/2023).        Vannessa Anderson, CNP  [facility] - [place] is a 67 year old, presenting for the following health issues:  Chronic Disease Management and Imm/Inj (Flu Shot)      Hyperlipidemia Follow-Up    Are you regularly taking any medication or supplement to lower your cholesterol?   No    Are you having muscle aches or other side effects that you think could be caused by your cholesterol lowering medication?  No      Hypertension Follow-up    Do you check your blood pressure regularly outside of the clinic? No     Are you following a low  salt diet? Yes    Are your blood pressures ever more than 140 on the top number (systolic) OR more   than 90 on the bottom number (diastolic), for example 140/90? Yes      Migraine     Since your last clinic visit, how have your headaches changed?  No change    How often are you getting headaches or migraines? 2 a month     Are you able to do normal daily activities when you have a migraine? Yes    Are you taking rescue/relief medications? (Select all that apply) ibuprofen (Advil, Motrin) and Tylenol    How helpful is your rescue/relief medication?  I get some relief    Are you taking any medications to prevent migraines? (Select all that apply)  No    In the past 4 weeks, how often have you gone to urgent care or the emergency room because of your headaches?  0      Psoriasis   - Uses triamcinolone ointment daily on legs  - Has not seen Dermatology recently  - Continue plan of care      Urinary Frequency  - Reports being up 4 times throughout the night  - Last visit with Dr. Patiño in Urology was 11/4/21   - Declines medication today    Vitamin D Deficiency   - Takes 2000 units daily  - Continue Plan of Care    Patient Active Problem List   Diagnosis     Dermatitis     Alopecia     Vitamin D deficiency     Benign essential hypertension     Hyperlipidemia LDL goal <100     Migraine with aura and without status migrainosus, not intractable     CNH (chondrodermatitis nodularis helicis)     Lichen sclerosus et atrophicus--VULVA 2012, Clobetasol, exam--7/21/2020     Other atopic dermatitis and related conditions     Microscopic hematuria--6/2020, 7/2020     Psoriasis     Pulmonary nodules     Past Surgical History:   Procedure Laterality Date     BIOPSY Right 06/15/2017    US guided right breast needle biopsy     BIOPSY BREAST Bilateral 7/17/2017    Procedure: BIOPSY BREAST;  EXCISIONAL BIOPSY RIGHT BREAST MASS, EXCISION LEFT BREAST SKIN TAG;  Surgeon: Tianna Hanley MD;  Location: [facility] OR     BLADDER SURGERY  1981     "\"bladder tacked up\"     CHOLECYSTECTOMY  1975    Cholelithiasis     COLONOSCOPY N/A 1/12/2015    Procedure: COLONOSCOPY;  Surgeon: Tianna Hanley MD;  Location: [facility] OR     facial redisection gland removal      LEFT > infected neck gland     HYSTERECTOMY TOTAL ABDOMINAL N/A 01/01/1981     [facility] ORAL SURGERY PROCEDURE  08/23/2018    Had rods put in to prep for implants        Social History     Tobacco Use     Smoking status: Every Day     Packs/day: 2.00     Years: 30.00     Pack years: 60.00     Types: Cigarettes     Smokeless tobacco: Never     Tobacco comments:     Tried to Quit (YES); Longest Tobacco Free (\"Cutdown\")   Substance Use Topics     Alcohol use: Yes     Comment: OCCASIONALLY     Family History   Problem Relation Age of Onset     Cancer Brother         Liver     Colon Cancer Brother      Cancer Brother         Pancreatic     Cancer Other         Family Hx     Diabetes Father      Cancer Father         Stomach     Heart Disease Father         Heart Disease     Heart Disease Mother         Heart Disease     Cancer Mother 73        Liver     Cancer Sister 57        Pancreatic - Cause of Death             Current Outpatient Medications   Medication Sig Dispense Refill     clobetasol (TEMOVATE) 0.05 % external cream Apply topically 2 times daily Takes for vaginal itching for her LSA per pt. 45 g 1     Lactobacillus (PROBIOTIC ACIDOPHILUS PO)        losartan (COZAAR) 50 MG tablet Take 1 tablet (50 mg) by mouth in the morning. 90 tablet 1     metoprolol succinate ER (TOPROL-XL) 25 MG 24 hr tablet Take 1 tablet (25 mg) by mouth in the morning. 90 tablet 1     triamcinolone (KENALOG) 0.5 % external ointment Apply to affected areas BID 60 g 1     vitamin D3 (CHOLECALCIFEROL) 50 mcg (2000 units) tablet Take 1 tablet (50 mcg) by mouth daily 90 tablet 3         Allergies   Allergen Reactions     Bacitracin      Codeine Nausea and Vomiting     cramping     Doxepin Unknown     face swelling     No Clinical Screening - " See Comments Other (See Comments)     face swelling     Penicillins Hives     Provider wants to try Ancef(7/17/17).  Hives developed within 45 minutes.     Simvastatin Hives     Recent Labs   Lab Test 04/11/22  1328 10/01/21  1323 03/30/21  1415 11/02/20  1353 06/14/20  1846   * 161* 163*   < >  --    HDL 50 54 53   < >  --    TRIG 122 111 171*   < >  --    ALT 22 23 24  --  28   CR 0.63 0.73 0.48*  --  0.54   GFRESTIMATED >90 85 >90  --  >90   GFRESTBLACK  --   --  >90  --  >90   POTASSIUM 3.9 4.2 4.1  --  3.8   TSH 1.91  --  1.96   < >  --     < > = values in this interval not displayed.          BP Readings from Last 3 Encounters:   10/21/22 137/62   04/11/22 124/64   11/04/21 128/72    Wt Readings from Last 3 Encounters:   10/21/22 59 kg (130 lb)   11/04/21 59 kg (130 lb)   10/01/21 59 kg (130 lb)                Review of Systems   Constitutional, HEENT, cardiovascular, pulmonary, gi and gu systems are negative, except as otherwise noted.          Objective    /62 (BP Location: Right arm, Patient Position: Sitting, Cuff Size: Adult Regular)   Pulse 70   Temp 97.9  F (36.6  C) (Tympanic)   Resp 19   Wt 59 kg (130 lb)   SpO2 93%   BMI 24.56 kg/m    Body mass index is 24.56 kg/m .         Physical Exam   GENERAL: healthy, alert and no distress  EYES: Eyes grossly normal to inspection, PERRL and conjunctivae and sclerae normal  HENT: ear canals and TM's normal, nose and mouth without ulcers or lesions  NECK: no adenopathy, no asymmetry, masses, or scars and thyroid normal to palpation  RESP: lungs clear to auscultation - no rales, rhonchi or wheezes  CV: regular rate and rhythm, normal S1 S2, no S3 or S4, no murmur, click or rub, no peripheral edema and peripheral pulses strong  ABDOMEN: soft, nontender, no hepatosplenomegaly, no masses and bowel sounds normal  MS: no gross musculoskeletal defects noted, no edema  SKIN: no suspicious lesions or rashes  PSYCH: mentation appears normal, affect  normal/bright  LYMPH: no cervical, supraclavicular, axillary, or inguinal adenopathy          Patient is seen in conjunction with NP student.  History is reviewed with patient and pertinent portions of the exam are repeated.  Assessment and plan is reviewed with the patient.        Lise Gonsalez, NP Student

## 2022-10-21 ENCOUNTER — OFFICE VISIT (OUTPATIENT)
Dept: FAMILY MEDICINE | Facility: OTHER | Age: 67
End: 2022-10-21
Attending: NURSE PRACTITIONER
Payer: COMMERCIAL

## 2022-10-21 VITALS
WEIGHT: 130 LBS | TEMPERATURE: 97.9 F | RESPIRATION RATE: 19 BRPM | BODY MASS INDEX: 24.56 KG/M2 | SYSTOLIC BLOOD PRESSURE: 137 MMHG | OXYGEN SATURATION: 93 % | DIASTOLIC BLOOD PRESSURE: 62 MMHG | HEART RATE: 70 BPM

## 2022-10-21 DIAGNOSIS — Z23 NEED FOR PROPHYLACTIC VACCINATION AND INOCULATION AGAINST INFLUENZA: ICD-10-CM

## 2022-10-21 DIAGNOSIS — E78.5 HYPERLIPIDEMIA LDL GOAL <100: ICD-10-CM

## 2022-10-21 DIAGNOSIS — Z23 NEED FOR VACCINATION: ICD-10-CM

## 2022-10-21 DIAGNOSIS — M85.89 OSTEOPENIA OF MULTIPLE SITES: ICD-10-CM

## 2022-10-21 DIAGNOSIS — E55.9 VITAMIN D DEFICIENCY: ICD-10-CM

## 2022-10-21 DIAGNOSIS — R35.0 URINARY FREQUENCY: ICD-10-CM

## 2022-10-21 DIAGNOSIS — Z78.0 MENOPAUSE PRESENT: ICD-10-CM

## 2022-10-21 DIAGNOSIS — L40.9 PSORIASIS: ICD-10-CM

## 2022-10-21 DIAGNOSIS — I10 BENIGN ESSENTIAL HYPERTENSION: ICD-10-CM

## 2022-10-21 DIAGNOSIS — G43.109 MIGRAINE WITH AURA AND WITHOUT STATUS MIGRAINOSUS, NOT INTRACTABLE: Primary | ICD-10-CM

## 2022-10-21 DIAGNOSIS — Z71.89 COUNSELING REGARDING ADVANCED DIRECTIVES: ICD-10-CM

## 2022-10-21 LAB
ALBUMIN SERPL BCG-MCNC: 4.2 G/DL (ref 3.5–5.2)
ALBUMIN UR-MCNC: NEGATIVE MG/DL
ALP SERPL-CCNC: 65 U/L (ref 35–104)
ALT SERPL W P-5'-P-CCNC: 15 U/L (ref 10–35)
ANION GAP SERPL CALCULATED.3IONS-SCNC: 12 MMOL/L (ref 7–15)
APPEARANCE UR: CLEAR
AST SERPL W P-5'-P-CCNC: 16 U/L (ref 10–35)
BACTERIA #/AREA URNS HPF: ABNORMAL /HPF
BILIRUB SERPL-MCNC: 0.5 MG/DL
BILIRUB UR QL STRIP: NEGATIVE
BUN SERPL-MCNC: 9.1 MG/DL (ref 8–23)
CALCIUM SERPL-MCNC: 9 MG/DL (ref 8.8–10.2)
CHLORIDE SERPL-SCNC: 99 MMOL/L (ref 98–107)
CHOLEST SERPL-MCNC: 217 MG/DL
COLOR UR AUTO: YELLOW
CREAT SERPL-MCNC: 0.52 MG/DL (ref 0.51–0.95)
DEPRECATED HCO3 PLAS-SCNC: 25 MMOL/L (ref 22–29)
GFR SERPL CREATININE-BSD FRML MDRD: >90 ML/MIN/1.73M2
GLUCOSE SERPL-MCNC: 87 MG/DL (ref 70–99)
GLUCOSE UR STRIP-MCNC: NEGATIVE MG/DL
HDLC SERPL-MCNC: 44 MG/DL
HGB UR QL STRIP: ABNORMAL
HOLD SPECIMEN: NORMAL
KETONES UR STRIP-MCNC: NEGATIVE MG/DL
LDLC SERPL CALC-MCNC: 149 MG/DL
LEUKOCYTE ESTERASE UR QL STRIP: ABNORMAL
NITRATE UR QL: NEGATIVE
NONHDLC SERPL-MCNC: 173 MG/DL
PH UR STRIP: 5.5 [PH] (ref 5–7)
POTASSIUM SERPL-SCNC: 4.3 MMOL/L (ref 3.4–5.3)
PROT SERPL-MCNC: 7.2 G/DL (ref 6.4–8.3)
RBC #/AREA URNS AUTO: ABNORMAL /HPF
SODIUM SERPL-SCNC: 136 MMOL/L (ref 136–145)
SP GR UR STRIP: 1.01 (ref 1–1.03)
SQUAMOUS #/AREA URNS AUTO: ABNORMAL /LPF
TRIGL SERPL-MCNC: 122 MG/DL
TSH SERPL DL<=0.005 MIU/L-ACNC: 1.06 UIU/ML (ref 0.3–4.2)
UROBILINOGEN UR STRIP-ACNC: 0.2 E.U./DL
WBC #/AREA URNS AUTO: ABNORMAL /HPF

## 2022-10-21 PROCEDURE — 36415 COLL VENOUS BLD VENIPUNCTURE: CPT | Mod: ZL | Performed by: NURSE PRACTITIONER

## 2022-10-21 PROCEDURE — 81001 URINALYSIS AUTO W/SCOPE: CPT | Mod: ZL | Performed by: NURSE PRACTITIONER

## 2022-10-21 PROCEDURE — 80061 LIPID PANEL: CPT | Mod: ZL | Performed by: NURSE PRACTITIONER

## 2022-10-21 PROCEDURE — 99214 OFFICE O/P EST MOD 30 MIN: CPT | Performed by: NURSE PRACTITIONER

## 2022-10-21 PROCEDURE — G0463 HOSPITAL OUTPT CLINIC VISIT: HCPCS

## 2022-10-21 PROCEDURE — G0463 HOSPITAL OUTPT CLINIC VISIT: HCPCS | Mod: 25

## 2022-10-21 PROCEDURE — 80053 COMPREHEN METABOLIC PANEL: CPT | Mod: ZL | Performed by: NURSE PRACTITIONER

## 2022-10-21 PROCEDURE — 84443 ASSAY THYROID STIM HORMONE: CPT | Mod: ZL | Performed by: NURSE PRACTITIONER

## 2022-10-21 PROCEDURE — G0008 ADMIN INFLUENZA VIRUS VAC: HCPCS

## 2022-10-21 RX ORDER — LORAZEPAM 0.5 MG/1
0.5 TABLET ORAL
COMMUNITY
End: 2022-10-21

## 2022-10-21 RX ORDER — LOSARTAN POTASSIUM 50 MG/1
50 TABLET ORAL DAILY
Qty: 90 TABLET | Refills: 1 | Status: SHIPPED | OUTPATIENT
Start: 2022-10-21 | End: 2023-04-24

## 2022-10-21 RX ORDER — METOPROLOL SUCCINATE 25 MG/1
25 TABLET, EXTENDED RELEASE ORAL DAILY
Qty: 90 TABLET | Refills: 1 | Status: SHIPPED | OUTPATIENT
Start: 2022-10-21 | End: 2023-03-17

## 2022-10-21 RX ORDER — LORAZEPAM 0.5 MG/1
0.25 TABLET ORAL EVERY 6 HOURS PRN
Status: CANCELLED | OUTPATIENT
Start: 2022-10-21

## 2022-10-21 ASSESSMENT — PAIN SCALES - GENERAL: PAINLEVEL: NO PAIN (0)

## 2022-10-21 NOTE — PATIENT INSTRUCTIONS
Assessment & Plan         1. Migraine with aura and without status migrainosus, not intractable  - Follow-up as needed        2. Hyperlipidemia LDL goal <100  - Lipid Profile (Chol, Trig, HDL, LDL calc); Future  - UA reflex to Microscopic and Culture - [facility]; Future  - Comprehensive metabolic panel; Future  - TSH with free T4 reflex; Future        3. Benign essential hypertension  - metoprolol succinate ER (TOPROL XL) 25 MG 24 hr tablet; Take 1 tablet (25 mg) by mouth daily  Dispense: 90 tablet; Refill: 1  - losartan (COZAAR) 50 MG tablet; Take 1 tablet (50 mg) by mouth daily  Dispense: 90 tablet; Refill: 1  - Lipid Profile (Chol, Trig, HDL, LDL calc); Future  - *UA reflex to Microscopic and Culture - [facility]; Future  - Comprehensive metabolic panel; Future  - TSH with free T4 reflex; Future        4. Need for prophylactic vaccination and inoculation against influenza  - INFLUENZA, QUAD, HIGH DOSE, PF, 65YR + (FLUZONE HD)  - ADMIN INFLUENZA (For MEDICARE Patients ONLY) []        5. Need for vaccination  - Flu shot        6. Vitamin D deficiency  - D3 5000 U daily        7. Psoriasis  - Follow-up as needed        8. Urinary frequency  - Follow-up as needed        9. Menopause present  - DX Hip/Pelvis/Spine; Future        10. Osteopenia of multiple sites  - DX Hip/Pelvis/Spine; Future        Return in about 6 months (around 4/21/2023).        Vannessa Anderson, CNP  [facility]

## 2022-10-21 NOTE — NURSING NOTE
"Chief Complaint   Patient presents with     Chronic Disease Management       Initial /62 (BP Location: Right arm, Patient Position: Sitting, Cuff Size: Adult Regular)   Pulse 70   Temp 97.9  F (36.6  C) (Tympanic)   Resp 19   Wt 59 kg (130 lb)   SpO2 93%   BMI 24.56 kg/m   Estimated body mass index is 24.56 kg/m  as calculated from the following:    Height as of 11/4/21: 1.549 m (5' 1\").    Weight as of this encounter: 59 kg (130 lb).  Medication Reconciliation: complete  Nakita Moseley LPN  "

## 2022-10-25 DIAGNOSIS — E78.5 HYPERLIPIDEMIA LDL GOAL <100: Primary | ICD-10-CM

## 2022-10-25 RX ORDER — GEMFIBROZIL 600 MG/1
600 TABLET, FILM COATED ORAL 2 TIMES DAILY
Qty: 180 TABLET | Refills: 1 | Status: SHIPPED | OUTPATIENT
Start: 2022-10-25 | End: 2023-01-30 | Stop reason: SINTOL

## 2022-11-11 ENCOUNTER — TELEPHONE (OUTPATIENT)
Dept: FAMILY MEDICINE | Facility: OTHER | Age: 67
End: 2022-11-11

## 2022-11-11 DIAGNOSIS — R06.2 WHEEZING: Primary | ICD-10-CM

## 2022-11-11 RX ORDER — ALBUTEROL SULFATE 90 UG/1
2 AEROSOL, METERED RESPIRATORY (INHALATION) EVERY 6 HOURS
Qty: 18 G | Refills: 0 | Status: SHIPPED | OUTPATIENT
Start: 2022-11-11 | End: 2023-11-01

## 2022-11-11 NOTE — TELEPHONE ENCOUNTER
Patient called and would like an inhaler sent in. Patient states she has a cold and having some sob and that has helped in the past. Patient uses [facility].

## 2023-01-30 ENCOUNTER — OFFICE VISIT (OUTPATIENT)
Dept: FAMILY MEDICINE | Facility: OTHER | Age: 68
End: 2023-01-30
Attending: NURSE PRACTITIONER
Payer: COMMERCIAL

## 2023-01-30 VITALS
RESPIRATION RATE: 21 BRPM | HEART RATE: 70 BPM | OXYGEN SATURATION: 93 % | SYSTOLIC BLOOD PRESSURE: 152 MMHG | TEMPERATURE: 98 F | WEIGHT: 125 LBS | DIASTOLIC BLOOD PRESSURE: 72 MMHG | BODY MASS INDEX: 23.62 KG/M2

## 2023-01-30 DIAGNOSIS — I10 BENIGN ESSENTIAL HYPERTENSION: ICD-10-CM

## 2023-01-30 DIAGNOSIS — M62.838 MUSCLE SPASM: Primary | ICD-10-CM

## 2023-01-30 PROCEDURE — G0463 HOSPITAL OUTPT CLINIC VISIT: HCPCS | Performed by: NURSE PRACTITIONER

## 2023-01-30 PROCEDURE — 99214 OFFICE O/P EST MOD 30 MIN: CPT | Performed by: NURSE PRACTITIONER

## 2023-01-30 RX ORDER — CYCLOBENZAPRINE HCL 10 MG
1 TABLET ORAL 3 TIMES DAILY PRN
COMMUNITY
Start: 2023-01-28 | End: 2023-01-30

## 2023-01-30 RX ORDER — CYCLOBENZAPRINE HCL 10 MG
10 TABLET ORAL 3 TIMES DAILY PRN
Qty: 60 TABLET | Refills: 1 | Status: SHIPPED | OUTPATIENT
Start: 2023-01-30 | End: 2023-11-01

## 2023-01-30 RX ORDER — NITROGLYCERIN 0.4 MG/1
0.4 TABLET SUBLINGUAL
COMMUNITY
Start: 2023-01-26 | End: 2023-02-05

## 2023-01-30 RX ORDER — AMLODIPINE BESYLATE 2.5 MG/1
TABLET ORAL
COMMUNITY
Start: 2023-01-26 | End: 2023-01-30

## 2023-01-30 RX ORDER — AMLODIPINE BESYLATE 10 MG/1
10 TABLET ORAL DAILY
Qty: 360 TABLET | Refills: 1 | Status: SHIPPED | OUTPATIENT
Start: 2023-01-30 | End: 2023-11-01

## 2023-01-30 ASSESSMENT — PAIN SCALES - GENERAL: PAINLEVEL: NO PAIN (0)

## 2023-01-30 NOTE — PROGRESS NOTES
"  Assessment & Plan          Benign essential hypertension  - amLODIPine (NORVASC) 10 MG tablet; Take 1 tablet (10 mg) by mouth daily for 90 days        Muscle spasm  - cyclobenzaprine (FLEXERIL) 10 MG tablet; Take 1 tablet (10 mg) by mouth 3 times daily as needed for muscle spasm-   - Voltaren Gel OTC        To ER with any concerns  Keep cardiology appointment as scheduled by [facility] in about 2 weeks (around 2/13/2023) for BP check - nurse only.        Vannessa Anderson, CNP  [facility] - [place] is a 67 year old, presenting for the following health issues:  Hypertension        ED/UC Followup:  Facility:  [facility]  Date of visit: 01/26/2023 and 01/28/2023  Reason for visit: chest pain/muscle spasm  Current Status: chest pain has resolved        Rita Fournier is a 67 year old individual with history of hyperlipidemia, hypertension, pulmonary nodules, comes in today for chest pressure with left sided arm pain.  Patient states that symptoms have been going on for a week. Was seen at [facility] emergency room on 1/26/2023 for same reasons and nothing was found. Patient states she continues to have the chest pressure with occasional radiation of pain and \"coldness\" in the left arm with pain radiating between the shoulder blades so comes in. Patient states that she took 162 mg of baby aspirin and nitro with mild relief.   Currently denies dizziness, lightheadedness, abdominal pain. Does have occasional complaints of neck pain.    Comment: Discrepancies between my note and notes on behalf of the nursing team or other care providers are secondary to my findings reflecting my physical examination and questioning of the patient. Any conflicting information provided is not in line with my examination of the patient.    ECG:   ECG conducted at 1231 personally reviewed at 1235 showing sinus rhythm with ventricular rate in the 70's. Normal axis. Anterolateral ST/T wave " abnormalities present. Abnormal ECG. When compared to ECG from 1/26/2023, now is having ST/T wave abnormalities in anterolateral leads.    LABS:     Recent Results (from the past 12 hour(s))   HEMOGRAM/DIFFERENTIAL   Result Value Ref Range   WBC 6.6 3.2 - 11.0 10*9/L   RBC 3.79 3.77 - 5.24 10*12/L   HGB 13.1 11.2 - 15.5 g/dL   HCT 38.9 34.3 - 46.0 %   .6 (H) 81.4 - 99.0 fL   MCH 34.6 (H) 26.7 - 33.1 pg   MCHC 33.7 31.6 - 35.5 g/dL   RDW 13.0 11.3 - 14.6 %    130 - 375 10*9/L   Neutrophils % 70.9 %   Lymphocytes % 17.8 %   Monocytes % 8.1 %   Eosinophils % 2.0 %   Basophils % 0.9 %   Immature Granulocytes % 0.3 %   Neutrophils Absolute 4.7 1.5 - 7.6 10*9/L   Lymphocytes Absolute 1.2 0.8 - 3.3 10*9/L   Monocytes Absolute 0.5 0.2 - 0.9 10*9/L   Eosinophils Absolute 0.1 0.0 - 0.4 10*9/L   Basophils Absolute 0.1 0.0 - 0.1 10*9/L   Immature Granulocytes Absolute 0.02 0.00 - 0.06 10*9/L   D-DIMER   Result Value Ref Range   D-Dimer 0.54 (H) <=0.49 ug/mL FEU   Narrative   The negative predictive cutoff for aid in exclusion of VTE/DVT   is <0.5 ug/ml FEU.   COMPREHENSIVE METABOLIC PANEL   Result Value Ref Range   Sodium 138 134 - 143 mEq/L   Potassium 3.4 3.4 - 5.1 mEq/L   Chloride 105 99 - 110 mEq/L   Carbon Dioxide 21 19 - 29 mEq/L   Anion Gap 12.0 3.0 - 15.0 mEq/L   Blood Urea Nitrogen 10 5 - 24 mg/dL   Creatinine 0.68 0.40 - 1.00 mg/dL   Glomerular Filtration Rate 94 >60 mL/min/1.73 m*2   Calcium 9.0 8.4 - 10.5 mg/dL   Glucose 119 (H) 70 - 99 mg/dL   Protein, Total 7.0 6.0 - 8.0 g/dL   Albumin 3.9 3.5 - 5.0 g/dL   Alkaline Phosphatase 54 40 - 150 IU/L   Aspartate Aminotransferase 11 10 - 40 IU/L   Alanine Aminotransferase 9 6 - 31 IU/L   Bilirubin, Total 0.6 0.2 - 1.2 mg/dL   Narrative   Current ADA criteria for Glucose:   Normal: 70-99 mg/dL  Impaired Fasting Glucose: 100-125 mg/dL  Diabetes Mellitus: at or above 126 mg/dL  The diagnosis of diabetes must be confirmed on a subsequent day by measuring Fasting  Plasma Glucose, 2-hr PG or random plasma glucose (if symptoms are present).   TROPONIN I   Result Value Ref Range   Troponin I 0.002 0.000 - 0.028 ng/mL   Narrative   Because no pediatric reference range data is published, the adult reference range will be utilized as this represents 99% of healthy adults.   MAGNESIUM   Result Value Ref Range   Magnesium 1.9 1.8 - 2.7 mg/dL     IMAGING STUDIES:     Recent Results (from the past 24 hour(s))   XR CHEST 2 VIEWS   Narrative   This document is currently in Preliminary Status    Exam Accession# [id number]    XR CHEST 2 VIEWS    HISTORY: Chest pain.     COMPARISON: 01/26/2023    TECHNIQUE: PA and lateral views.    IMPRESSION: Calcification of the aorta with a normal cardiac size is present. Kyphosis with thoracic spurring of the mid vertebra is noted. There is no focal consolidation or pleural effusion.     ED Course/MDM/Plan:   Patient comes in with recurrent/persistent chest tightness with intermittent left arm pain/coldness that has been going on for a week.  Patient had extensive cardiac work-up done 3 days prior which were negative. ECG was obtained upon arrival which did show nonspecific ST/T wave abnormalities upon arrival which was concerning. Troponin was negative. D-dimer age-specific normal making PE unlikely and MI/NSTEMI unlikely. Physical examination did show focal/reproducible left medial/superior muscle tenderness around the scapula which did exacerbate symptoms. Ketorolac 15 mg IV given for this revision with improvement of symptoms. Chest x-ray was obtained showing no acute cardiopulmonary abnormalities. Rest of lab work was benign.  As patient has reproducible symptoms that is most likely cervical/thoracic spine abnormality and even in pinch meant of nerve that could be the cause of patient's symptoms. We will discharge patient home on cyclobenzaprine 10 mg every 8 hours as needed. Did educate patient about sedating effects of this so no driving or alcohol  use on it. We will have patient follow-up with chiropractics for possible treatment. Due to changes in ECG that are nonspecific, will have patient continue with scheduled stress test and cardiology follow-up.  Advised patient to follow-up with PCP, chiropractics, and cardiology as scheduled. Return to ER if any new or worsening symptoms. Patient verbalized understand agrees with plan of care. Patient discharged home with .    DIAGNOSIS:   (M62.830) Muscle spasm of back, Active (primary encounter diagnosis)  (R07.9) Chest pain, unspecified type, Active    Critical Care Time: None    Impression and plan discussed with patient. Questions answered, concerns addressed, indications for urgent re-evaluation reviewed, and  given. Patient/Parent/Caregiver agree with treatment plan and have no further questions at this time. AVS provided at discharge.    This note was created by the Dragon Voice Dictation System. Inadvertent typographical errors, due to software recognition problems, may still exist.    Jt HAMPTON, CNP          Hypertension Follow-up    Do you check your blood pressure regularly outside of the clinic? Yes     Are you following a low salt diet? Yes    Are your blood pressures ever more than 140 on the top number (systolic) OR more   than 90 on the bottom number (diastolic), for example 140/90? Yes         Referral to Cardiology at [facility] sent by the ER. Patient prefers to remain at [facility] for cardiology care.   Appointments scheduled for 2/13/23 and 2/20/23        Hypertension  - Amlodipine dose increased to 10 mg daily        Migraines  - reports migraine with aura roughly once per week  - relieved with tylenol and ibuprofen  - declines any extra medications today        Review of Systems   Constitutional, HEENT, cardiovascular, pulmonary, gi and gu systems are negative, except as otherwise noted.            Objective    BP (!) 152/72 (BP Location: Right arm, Patient Position:  Sitting, Cuff Size: Adult Regular)   Pulse 70   Temp 98  F (36.7  C) (Tympanic)   Resp 21   Wt 56.7 kg (125 lb)   SpO2 93%   BMI 23.62 kg/m    Body mass index is 23.62 kg/m .           Physical Exam   GENERAL: healthy, alert and no distress  NECK: no adenopathy, no asymmetry, masses, or scars and thyroid normal to palpation  RESP: lungs clear to auscultation - no rales, rhonchi or wheezes  CV: regular rate and rhythm, normal S1 S2, no S3 or S4, no murmur, click or rub, no peripheral edema and peripheral pulses strong  ABDOMEN: soft, nontender, no hepatosplenomegaly, no masses and bowel sounds normal  MS: no gross musculoskeletal defects noted, no edema  SKIN: no suspicious lesions or rashes  PSYCH: anxious        Arminda Gonsalez, NP Student      Patient is seen in conjunction with NP student.  History is reviewed with patient and pertinent portions of the exam are repeated.  Assessment and plan is reviewed with the patient.

## 2023-03-15 DIAGNOSIS — I10 BENIGN ESSENTIAL HYPERTENSION: ICD-10-CM

## 2023-03-16 NOTE — TELEPHONE ENCOUNTER
metoprolol      Last Written Prescription Date:  10/21/22  Last Fill Quantity: 90,   # refills: 1  Last Office Visit: 1/30/23  Future Office visit:    Next 5 appointments (look out 90 days)    Apr 24, 2023  1:15 PM  (Arrive by 1:00 PM)  SHORT with Vannessa Anderson CNP  [facility] [address]  [facility] [address]  [phone]

## 2023-03-17 RX ORDER — METOPROLOL SUCCINATE 25 MG/1
TABLET, EXTENDED RELEASE ORAL
Qty: 90 TABLET | Refills: 1 | Status: SHIPPED | OUTPATIENT
Start: 2023-03-17 | End: 2023-11-01

## 2023-03-17 NOTE — TELEPHONE ENCOUNTER
Beta-Blockers Protocol Failed      Blood pressure under 140/90 in past 12 months        BP Readings from Last 3 Encounters:   01/30/23 (!) 152/72   10/21/22 137/62   04/11/22 124/64        (2) well flexed

## 2023-04-13 NOTE — PROGRESS NOTES
Assessment & Plan          Benign essential hypertension  - losartan (COZAAR) 50 MG tablet; Take 1 tablet (50 mg) by mouth daily  - Lipid Profile (Chol, Trig, HDL, LDL calc)  - Comprehensive metabolic panel  - TSH with free T4 reflex  - *UA reflex to Microscopic and Culture - [facility]  - UA Microscopic with Reflex to Culture      Hyperlipidemia LDL goal <100  - Lipid Profile (Chol, Trig, HDL, LDL calc)  - Comprehensive metabolic panel  - TSH with free T4 reflex      Migraine with aura and without status migrainosus, not intractable  - Continue plan of care      Vitamin D deficiency  - D3 OTC      Personal history of tobacco use  - SMOKING CESSATION COUNSELING 3-10 MIN  - Prof fee: Shared Decision Making for Lung Cancer Screening  - CT Chest Lung Cancer Scrn Low Dose wo; Future      Pulmonary nodules  - See above      Encounter for screening mammogram for malignant neoplasm of breast  - MA SCREENING DIGITAL BILATERAL ([place]); Future        When your lab results return, we will call you with abnormal findings  You can also view this information in your MyChart, if you have an account.  Please call or EDITD message us with any questions you may have.       Return in about 6 months (around 10/24/2023).        Vannessa Anderson, CNP  [facility] [place] is a 67 year old, presenting for the following health issues:  Hypertension, Lipids, and Headache          Hyperlipidemia Follow-Up    Are you regularly taking any medication or supplement to lower your cholesterol?   No    Are you having muscle aches or other side effects that you think could be caused by your cholesterol lowering medication?  No        Hypertension Follow-up    Do you check your blood pressure regularly outside of the clinic? Yes     Are you following a low salt diet? Yes    Are your blood pressures ever more than 140 on the top number (systolic) OR more   than 90 on the bottom number (diastolic), for example  "140/90? No        Migraine     Since your last clinic visit, how have your headaches changed?  No change    How often are you getting headaches or migraines? Couple times a week     Are you able to do normal daily activities when you have a migraine? Yes    Are you taking rescue/relief medications? (Select all that apply) ibuprofen (Advil, Motrin) and Tylenol    How helpful is your rescue/relief medication?  I get some relief    Are you taking any medications to prevent migraines? (Select all that apply)  No    In the past 4 weeks, how often have you gone to urgent care or the emergency room because of your headaches?  0          Patient Active Problem List   Diagnosis     Dermatitis     Alopecia     Vitamin D deficiency     Benign essential hypertension     Hyperlipidemia LDL goal <100     Migraine with aura and without status migrainosus, not intractable     CNH (chondrodermatitis nodularis helicis)     Lichen sclerosus et atrophicus--VULVA 2012, Clobetasol, exam--7/21/2020     Other atopic dermatitis and related conditions     Microscopic hematuria--6/2020, 7/2020     Psoriasis     Pulmonary nodules     Osteopenia of multiple sites     Counseling regarding advanced directives     Past Surgical History:   Procedure Laterality Date     BIOPSY Right 06/15/2017    US guided right breast needle biopsy     BIOPSY BREAST Bilateral 7/17/2017    Procedure: BIOPSY BREAST;  EXCISIONAL BIOPSY RIGHT BREAST MASS, EXCISION LEFT BREAST SKIN TAG;  Surgeon: Tianna Hanley MD;  Location: [facility] OR     BLADDER SURGERY  1981    \"bladder tacked up\"     CHOLECYSTECTOMY  1975    Cholelithiasis     COLONOSCOPY N/A 1/12/2015    Procedure: COLONOSCOPY;  Surgeon: Tianna Hanley MD;  Location: [facility] OR     facial redisection gland removal      LEFT > infected neck gland     HYSTERECTOMY TOTAL ABDOMINAL N/A 01/01/1981     [facility] ORAL SURGERY PROCEDURE  08/23/2018    Had rods put in to prep for implants        Social History     Tobacco Use     " "Smoking status: Every Day     Packs/day: 1.00     Years: 30.00     Pack years: 30.00     Types: Cigarettes     Smokeless tobacco: Never     Tobacco comments:     Tried to Quit (YES); Longest Tobacco Free (\"Cutdown\")   Vaping Use     Vaping status: Not on file   Substance Use Topics     Alcohol use: Yes     Comment: OCCASIONALLY     Family History   Problem Relation Age of Onset     Cancer Brother         Liver     Colon Cancer Brother      Cancer Brother         Pancreatic     Cancer Other         Family Hx     Diabetes Father      Cancer Father         Stomach     Heart Disease Father         Heart Disease     Heart Disease Mother         Heart Disease     Cancer Mother 73        Liver     Cancer Sister 57        Pancreatic - Cause of Death             Current Outpatient Medications   Medication Sig Dispense Refill     amLODIPine (NORVASC) 10 MG tablet Take 1 tablet (10 mg) by mouth daily for 90 days 360 tablet 1     clobetasol (TEMOVATE) 0.05 % external cream Apply topically 2 times daily Takes for vaginal itching for her LSA per pt. 45 g 1     cyclobenzaprine (FLEXERIL) 10 MG tablet Take 1 tablet (10 mg) by mouth 3 times daily as needed for muscle spasms 60 tablet 1     Lactobacillus (PROBIOTIC ACIDOPHILUS PO)        losartan (COZAAR) 50 MG tablet Take 1 tablet (50 mg) by mouth daily 90 tablet 1     metoprolol succinate ER (TOPROL XL) 25 MG 24 hr tablet TAKE 1 TABLET(25 MG) BY MOUTH DAILY 90 tablet 1     triamcinolone (KENALOG) 0.5 % external ointment Apply to affected areas BID 60 g 1     vitamin D3 (CHOLECALCIFEROL) 50 mcg (2000 units) tablet Take 1 tablet (50 mcg) by mouth daily 90 tablet 3     albuterol (PROAIR HFA/PROVENTIL HFA/VENTOLIN HFA) 108 (90 Base) MCG/ACT inhaler Inhale 2 puffs into the lungs every 6 hours for 14 days 18 g 0         Allergies   Allergen Reactions     Bacitracin      Codeine Nausea and Vomiting     cramping     Doxepin Unknown     face swelling     Lopid [Gemfibrozil] GI Disturbance " "    No Clinical Screening - See Comments Other (See Comments)     face swelling     Penicillins Hives     Provider wants to try Ancef(7/17/17).  Hives developed within 45 minutes.     Simvastatin Hives     Atorvastatin Palpitations         Recent Labs   Lab Test 10/21/22  1427 04/11/22  1328 10/01/21  1323 10/01/21  1323 03/30/21  1415 11/02/20  1353 06/14/20  1846   * 144*  --  161* 163*   < >  --    HDL 44* 50  --  54 53   < >  --    TRIG 122 122  --  111 171*   < >  --    ALT 15 22  --  23 24  --  28   CR 0.52 0.63   < > 0.73 0.48*  --  0.54   GFRESTIMATED >90 >90   < > 85 >90  --  >90   GFRESTBLACK  --   --   --   --  >90  --  >90   POTASSIUM 4.3 3.9   < > 4.2 4.1  --  3.8   TSH 1.06 1.91  --   --  1.96   < >  --     < > = values in this interval not displayed.          BP Readings from Last 3 Encounters:   04/24/23 136/74   01/30/23 (!) 152/72   10/21/22 137/62    Wt Readings from Last 3 Encounters:   04/24/23 56.7 kg (125 lb)   01/30/23 56.7 kg (125 lb)   10/21/22 59 kg (130 lb)                    Review of Systems   Constitutional, HEENT, cardiovascular, pulmonary, GI, , musculoskeletal, neuro, skin, endocrine and psych systems are negative, except as otherwise noted.            Objective    /74 (BP Location: Left arm, Cuff Size: Adult Regular)   Pulse 61   Temp 97.3  F (36.3  C) (Tympanic)   Resp 14   Ht 1.549 m (5' 1\")   Wt 56.7 kg (125 lb)   SpO2 93%   BMI 23.62 kg/m    Body mass index is 23.62 kg/m .         Physical Exam   GENERAL: healthy, alert and no distress  EYES: Eyes grossly normal to inspection, PERRL and conjunctivae and sclerae normal  HENT: ear canals and TM's normal, nose and mouth without ulcers or lesions  NECK: no adenopathy, no asymmetry, masses, or scars and thyroid normal to palpation  RESP: lungs clear to auscultation - no rales, rhonchi or wheezes  CV: regular rate and rhythm, normal S1 S2, no S3 or S4, no murmur, click or rub, no peripheral edema and peripheral " pulses strong  MS: no gross musculoskeletal defects noted, no edema  SKIN: no suspicious lesions or rashes  PSYCH: mentation appears normal, affect normal/bright              Lung Cancer Screening Shared Decision Making Visit     Rita Fournier, a 67 year old female, is eligible for lung cancer screening    History   Smoking Status     Every Day     Packs/day: 1.00     Years: 30.00     Types: Cigarettes   Smokeless Tobacco     Never   {    I have discussed with patient the risks and benefits of screening for lung cancer with low-dose CT.     The risks include:    radiation exposure: one low dose chest CT has as much ionizing radiation as about 15 chest x-rays, or 6 months of background radiation living in [place]      false positives: most findings/nodules are NOT cancer, but some might still require additional diagnostic evaluation, including biopsy    over-diagnosis: some slow growing cancers that might never have been clinically significant will be detected and treated unnecessarily     The benefit of early detection of lung cancer is contingent upon adherence to annual screening or more frequent follow up if indicated.     Furthermore, to benefit from screening, Rita must be willing and able to undergo diagnostic procedures, if indicated. Although no specific guide is available for determining severity of comorbidities, it is reasonable to withhold screening in patients who have greater mortality risk from other diseases.     We did discuss that the best way to prevent lung cancer is to not smoke.    Some patients may value a numeric estimation of lung cancer risk when evaluating if lung cancer screening is right for them, here is one calculator:    ShouldIScreen

## 2023-04-24 ENCOUNTER — OFFICE VISIT (OUTPATIENT)
Dept: FAMILY MEDICINE | Facility: OTHER | Age: 68
End: 2023-04-24
Attending: NURSE PRACTITIONER
Payer: COMMERCIAL

## 2023-04-24 VITALS
BODY MASS INDEX: 23.6 KG/M2 | RESPIRATION RATE: 14 BRPM | HEART RATE: 61 BPM | OXYGEN SATURATION: 93 % | TEMPERATURE: 97.3 F | HEIGHT: 61 IN | DIASTOLIC BLOOD PRESSURE: 74 MMHG | WEIGHT: 125 LBS | SYSTOLIC BLOOD PRESSURE: 136 MMHG

## 2023-04-24 DIAGNOSIS — Z12.31 ENCOUNTER FOR SCREENING MAMMOGRAM FOR MALIGNANT NEOPLASM OF BREAST: ICD-10-CM

## 2023-04-24 DIAGNOSIS — G43.109 MIGRAINE WITH AURA AND WITHOUT STATUS MIGRAINOSUS, NOT INTRACTABLE: ICD-10-CM

## 2023-04-24 DIAGNOSIS — E78.5 HYPERLIPIDEMIA LDL GOAL <100: ICD-10-CM

## 2023-04-24 DIAGNOSIS — E55.9 VITAMIN D DEFICIENCY: ICD-10-CM

## 2023-04-24 DIAGNOSIS — Z87.891 PERSONAL HISTORY OF TOBACCO USE: ICD-10-CM

## 2023-04-24 DIAGNOSIS — I10 BENIGN ESSENTIAL HYPERTENSION: Primary | ICD-10-CM

## 2023-04-24 DIAGNOSIS — R91.8 PULMONARY NODULES: ICD-10-CM

## 2023-04-24 LAB
ALBUMIN SERPL BCG-MCNC: 3.9 G/DL (ref 3.5–5.2)
ALBUMIN UR-MCNC: NEGATIVE MG/DL
ALP SERPL-CCNC: 70 U/L (ref 35–104)
ALT SERPL W P-5'-P-CCNC: 14 U/L (ref 10–35)
ANION GAP SERPL CALCULATED.3IONS-SCNC: 9 MMOL/L (ref 7–15)
APPEARANCE UR: CLEAR
AST SERPL W P-5'-P-CCNC: 18 U/L (ref 10–35)
BACTERIA #/AREA URNS HPF: ABNORMAL /HPF
BILIRUB SERPL-MCNC: 0.4 MG/DL
BILIRUB UR QL STRIP: NEGATIVE
BUN SERPL-MCNC: 8.5 MG/DL (ref 8–23)
CALCIUM SERPL-MCNC: 9.3 MG/DL (ref 8.8–10.2)
CHLORIDE SERPL-SCNC: 102 MMOL/L (ref 98–107)
CHOLEST SERPL-MCNC: 201 MG/DL
COLOR UR AUTO: YELLOW
CREAT SERPL-MCNC: 0.53 MG/DL (ref 0.51–0.95)
DEPRECATED HCO3 PLAS-SCNC: 26 MMOL/L (ref 22–29)
GFR SERPL CREATININE-BSD FRML MDRD: >90 ML/MIN/1.73M2
GLUCOSE SERPL-MCNC: 91 MG/DL (ref 70–99)
GLUCOSE UR STRIP-MCNC: NEGATIVE MG/DL
HDLC SERPL-MCNC: 57 MG/DL
HGB UR QL STRIP: NEGATIVE
HOLD SPECIMEN: NORMAL
KETONES UR STRIP-MCNC: NEGATIVE MG/DL
LDLC SERPL CALC-MCNC: 128 MG/DL
LEUKOCYTE ESTERASE UR QL STRIP: ABNORMAL
NITRATE UR QL: NEGATIVE
NONHDLC SERPL-MCNC: 144 MG/DL
PH UR STRIP: 5.5 [PH] (ref 5–7)
POTASSIUM SERPL-SCNC: 4.1 MMOL/L (ref 3.4–5.3)
PROT SERPL-MCNC: 6.8 G/DL (ref 6.4–8.3)
RBC #/AREA URNS AUTO: ABNORMAL /HPF
SODIUM SERPL-SCNC: 137 MMOL/L (ref 136–145)
SP GR UR STRIP: 1.02 (ref 1–1.03)
SQUAMOUS #/AREA URNS AUTO: ABNORMAL /LPF
TRIGL SERPL-MCNC: 81 MG/DL
TSH SERPL DL<=0.005 MIU/L-ACNC: 1.29 UIU/ML (ref 0.3–4.2)
UROBILINOGEN UR STRIP-ACNC: 0.2 E.U./DL
WBC #/AREA URNS AUTO: ABNORMAL /HPF

## 2023-04-24 PROCEDURE — 80061 LIPID PANEL: CPT | Mod: ZL | Performed by: NURSE PRACTITIONER

## 2023-04-24 PROCEDURE — 80053 COMPREHEN METABOLIC PANEL: CPT | Mod: ZL | Performed by: NURSE PRACTITIONER

## 2023-04-24 PROCEDURE — 99214 OFFICE O/P EST MOD 30 MIN: CPT | Performed by: NURSE PRACTITIONER

## 2023-04-24 PROCEDURE — G0463 HOSPITAL OUTPT CLINIC VISIT: HCPCS | Mod: 25

## 2023-04-24 PROCEDURE — G0296 VISIT TO DETERM LDCT ELIG: HCPCS | Performed by: NURSE PRACTITIONER

## 2023-04-24 PROCEDURE — 81003 URINALYSIS AUTO W/O SCOPE: CPT | Mod: ZL | Performed by: NURSE PRACTITIONER

## 2023-04-24 PROCEDURE — 99406 BEHAV CHNG SMOKING 3-10 MIN: CPT | Performed by: NURSE PRACTITIONER

## 2023-04-24 PROCEDURE — 81001 URINALYSIS AUTO W/SCOPE: CPT | Mod: ZL | Performed by: NURSE PRACTITIONER

## 2023-04-24 PROCEDURE — 36415 COLL VENOUS BLD VENIPUNCTURE: CPT | Mod: ZL | Performed by: NURSE PRACTITIONER

## 2023-04-24 PROCEDURE — 84443 ASSAY THYROID STIM HORMONE: CPT | Mod: ZL | Performed by: NURSE PRACTITIONER

## 2023-04-24 PROCEDURE — 2894A PR SMOKING CESSATION COUNSELING, 3-10 MIN: CPT | Performed by: NURSE PRACTITIONER

## 2023-04-24 RX ORDER — LOSARTAN POTASSIUM 50 MG/1
50 TABLET ORAL DAILY
Qty: 90 TABLET | Refills: 1 | Status: SHIPPED | OUTPATIENT
Start: 2023-04-24 | End: 2023-11-01

## 2023-04-24 ASSESSMENT — ANXIETY QUESTIONNAIRES
1. FEELING NERVOUS, ANXIOUS, OR ON EDGE: NOT AT ALL
5. BEING SO RESTLESS THAT IT IS HARD TO SIT STILL: NOT AT ALL
7. FEELING AFRAID AS IF SOMETHING AWFUL MIGHT HAPPEN: NOT AT ALL
GAD7 TOTAL SCORE: 0
6. BECOMING EASILY ANNOYED OR IRRITABLE: NOT AT ALL
GAD7 TOTAL SCORE: 0
2. NOT BEING ABLE TO STOP OR CONTROL WORRYING: NOT AT ALL
IF YOU CHECKED OFF ANY PROBLEMS ON THIS QUESTIONNAIRE, HOW DIFFICULT HAVE THESE PROBLEMS MADE IT FOR YOU TO DO YOUR WORK, TAKE CARE OF THINGS AT HOME, OR GET ALONG WITH OTHER PEOPLE: NOT DIFFICULT AT ALL
3. WORRYING TOO MUCH ABOUT DIFFERENT THINGS: NOT AT ALL

## 2023-04-24 ASSESSMENT — PAIN SCALES - GENERAL: PAINLEVEL: NO PAIN (0)

## 2023-04-24 ASSESSMENT — PATIENT HEALTH QUESTIONNAIRE - PHQ9
5. POOR APPETITE OR OVEREATING: NOT AT ALL
SUM OF ALL RESPONSES TO PHQ QUESTIONS 1-9: 0

## 2023-04-24 NOTE — PATIENT INSTRUCTIONS
Assessment & Plan          Benign essential hypertension  - losartan (COZAAR) 50 MG tablet; Take 1 tablet (50 mg) by mouth daily  - Lipid Profile (Chol, Trig, HDL, LDL calc)  - Comprehensive metabolic panel  - TSH with free T4 reflex  - *UA reflex to Microscopic and Culture - [facility]  - UA Microscopic with Reflex to Culture      Hyperlipidemia LDL goal <100  - Lipid Profile (Chol, Trig, HDL, LDL calc)  - Comprehensive metabolic panel  - TSH with free T4 reflex      Migraine with aura and without status migrainosus, not intractable  - Continue plan of care      Vitamin D deficiency  - D3 OTC      Personal history of tobacco use  - SMOKING CESSATION COUNSELING 3-10 MIN  - Prof fee: Shared Decision Making for Lung Cancer Screening  - CT Chest Lung Cancer Scrn Low Dose wo; Future      Pulmonary nodules  - See above      Encounter for screening mammogram for malignant neoplasm of breast  - MA SCREENING DIGITAL BILATERAL ([place]); Future          When your lab results return, we will call you with abnormal findings  You can also view this information in your MyChart, if you have an account.  Please call or [employer] message us with any questions you may have.           Return in about 6 months (around 10/24/2023).          Vannessa Anderson, CNP  [facility]  Lung Cancer Screening   Frequently Asked Questions  If you are at high-risk for lung cancer, getting screened with low-dose computed tomography (LDCT) every year can help save your life. This handout offers answers to some of the most common questions about lung cancer screening. If you have other questions, please call [phone] ([phone]).     What is it?  Lung cancer screening uses special X-ray technology to create an image of your lung tissue. The exam is quick and easy and takes less than 10 seconds. We don t give you any medicine or use any needles. You can eat before and after the exam. You don t need to change your  clothes as long as the clothing on your chest doesn t contain metal. But, you do need to be able to hold your breath for at least 6 seconds during the exam.    What is the goal of lung cancer screening?  The goal of lung cancer screening is to save lives. Many times, lung cancer is not found until a person starts having physical symptoms. Lung cancer screening can help detect lung cancer in the earliest stages when it may be easier to treat.    Who should be screened for lung cancer?  We suggest lung cancer screening for anyone who is at high-risk for lung cancer. You are in the high-risk group if you:     are between the ages of 55 and 79, and   have smoked at least 1 pack of cigarettes a day for 20 or more years, and   still smoke or have quit within the past 15 years.    However, if you have a new cough or shortness of breath, you should talk to your doctor before being screened.    Why does it matter if I have symptoms?  Certain symptoms can be a sign that you have a condition in your lungs that should be checked and treated by your doctor. These symptoms include fever, chest pain, a new or changing cough, shortness of breath that you have never felt before, coughing up blood or unexplained weight loss. Having any of these symptoms can greatly affect the results of lung cancer screening.       Should all smokers get an LDCT lung cancer screening exam?  It depends. Lung cancer screening is for a very specific group of men and women who have a history of heavy smoking over a long period of time (see  Who should be screened for lung cancer  above).  I am in the high-risk group, but have been diagnosed with cancer in the past. Is LDCT lung cancer screening right for me?  In some cases, you should not have LDCT lung screening, such as when your doctor is already following your cancer with CT scan studies. Your doctor will help you decide if LDCT lung screening is right for you.  Do I need to have a screening exam  every year?  Yes. If you are in the high-risk group described earlier, you should get an LDCT lung cancer screening exam every year until you are 79, or are no longer willing or able to undergo screening and possible procedures to diagnose and treat lung cancer.  How effective is LDCT at preventing death from lung cancer?  Studies have shown that LDCT lung cancer screening can lower the risk of death from lung cancer by 20 percent in people who are at high-risk.  What are the risks?  There are some risks and limitations of LDCT lung cancer screening. We want to make sure you understand the risks and benefits, so please let us know if you have any questions. Your doctor may want to talk with you more about these risks.   Radiation exposure: As with any exam that uses radiation, there is a very small increased risk of cancer. The amount of radiation in LDCT is small--about the same amount a person would get from a mammogram. Your doctor orders the exam when he or she feels the potential benefits outweigh the risks.   False negatives: No test is perfect, including LDCT. It is possible that you may have a medical condition, including lung cancer, that is not found during your exam. This is called a false negative result.   False positives and more testing: LDCT very often finds something in the lung that could be cancer, but in fact is not. This is called a false positive result. False positive tests often cause anxiety. To make sure these findings are not cancer, you may need to have more tests. These tests will be done only if you give us permission. Sometimes patients need a treatment that can have side effects, such as a biopsy. For more information on false positives, see  What can I expect from the results?    Findings not related to lung cancer: Your LDCT exam also takes pictures of areas of your body next to your lungs. In a very small number of cases, the CT scan will show an abnormal finding in one of these  areas, such as your kidneys, adrenal glands, liver or thyroid. This finding may not be serious, but you may need more tests. Your doctor can help you decide what other tests you may need, if any.  What can I expect from the results?  About 1 out of 4 LDCT exams will find something that may need more tests. Most of the time, these findings are lung nodules. Lung nodules are very small collections of tissue in the lung. These nodules are very common, and the vast majority--more than 97 percent--are not cancer (benign). Most are normal lymph nodes or small areas of scarring from past infections.  But, if a small lung nodule is found to be cancer, the cancer can be cured more than 90 percent of the time. To know if the nodule is cancer, we may need to get more images before your next yearly screening exam. If the nodule has suspicious features (for example, it is large, has an odd shape or grows over time), we will refer you to a specialist for further testing.  Will my doctor also get the results?  Yes. Your doctor will get a copy of your results.  Is it okay to keep smoking now that there s a cancer screening exam?  No. Tobacco is one of the strongest cancer-causing agents. It causes not only lung cancer, but other cancers and cardiovascular (heart) diseases as well. The damage caused by smoking builds over time. This means that the longer you smoke, the higher your risk of disease. While it is never too late to quit, the sooner you quit, the better.  Where can I find help to quit smoking?  The best way to prevent lung cancer is to stop smoking. If you have already quit smoking, congratulations and keep it up! For help on quitting smoking, please call [employer] at 6-299-QUITNOW (1-538.828.6567) or the American [employer] Society at 1-624.872.5062 to find local resources near you.  One-on-one health coaching:  If you d prefer to work individually with a health care provider on tobacco cessation, we offer:     Medication  Therapy Management:  Our specially trained pharmacists work closely with you and your doctor to help you quit smoking.  Call [phone] or [phone] (toll free).

## 2023-04-25 NOTE — RESULT ENCOUNTER NOTE
Stable labs other than very high lipids  Cardiac risk score is over 20%  Strongly encourage treating this.  Intolerance to statins, please see if she will consider a non-statin - Zetia 10 mg daily.        The 10-year ASCVD risk score (Luisa CLEMENTE, et al., 2019) is: 20.4%    Values used to calculate the score:      Age: 67 years      Sex: Female      Is Non- : No      Diabetic: No      Tobacco smoker: Yes      Systolic Blood Pressure: 136 mmHg      Is BP treated: Yes      HDL Cholesterol: 57 mg/dL      Total Cholesterol: 201 mg/dL       Vannessa MAYERSKings[facility]  [phone]

## 2023-06-12 ENCOUNTER — HOSPITAL ENCOUNTER (OUTPATIENT)
Dept: BONE DENSITY | Facility: HOSPITAL | Age: 68
Discharge: HOME | End: 2023-06-12
Attending: NURSE PRACTITIONER
Payer: MEDICARE

## 2023-06-12 ENCOUNTER — HOSPITAL ENCOUNTER (OUTPATIENT)
Dept: CT IMAGING | Facility: HOSPITAL | Age: 68
Discharge: HOME | End: 2023-06-12
Attending: NURSE PRACTITIONER
Payer: MEDICARE

## 2023-06-12 ENCOUNTER — ANCILLARY PROCEDURE (OUTPATIENT)
Dept: MAMMOGRAPHY | Facility: OTHER | Age: 68
End: 2023-06-12
Attending: NURSE PRACTITIONER
Payer: MEDICARE

## 2023-06-12 DIAGNOSIS — R91.1 LUNG NODULE: Primary | ICD-10-CM

## 2023-06-12 DIAGNOSIS — Z87.891 PERSONAL HISTORY OF TOBACCO USE: ICD-10-CM

## 2023-06-12 DIAGNOSIS — Z78.0 MENOPAUSE PRESENT: ICD-10-CM

## 2023-06-12 DIAGNOSIS — Z12.31 ENCOUNTER FOR SCREENING MAMMOGRAM FOR MALIGNANT NEOPLASM OF BREAST: ICD-10-CM

## 2023-06-12 DIAGNOSIS — M85.89 OSTEOPENIA OF MULTIPLE SITES: ICD-10-CM

## 2023-06-12 PROCEDURE — 77080 DXA BONE DENSITY AXIAL: CPT

## 2023-06-12 PROCEDURE — 71271 CT THORAX LUNG CANCER SCR C-: CPT

## 2023-06-12 PROCEDURE — 77067 SCR MAMMO BI INCL CAD: CPT | Mod: TC

## 2023-06-13 ENCOUNTER — TELEPHONE (OUTPATIENT)
Dept: MAMMOGRAPHY | Facility: OTHER | Age: 68
End: 2023-06-13

## 2023-06-13 ENCOUNTER — TELEPHONE (OUTPATIENT)
Dept: FAMILY MEDICINE | Facility: OTHER | Age: 68
End: 2023-06-13

## 2023-06-13 NOTE — RESULT ENCOUNTER NOTE
I will wait for the CT report from upcoming study so I can send all info with the referral.    Vannessa Anderson [facility]  [phone]

## 2023-06-16 ENCOUNTER — TELEPHONE (OUTPATIENT)
Dept: FAMILY MEDICINE | Facility: OTHER | Age: 68
End: 2023-06-16

## 2023-06-16 NOTE — TELEPHONE ENCOUNTER
Patient notified of plan to hold of on referral until she has ct with contrast done and Vannessa sees results.

## 2023-06-16 NOTE — TELEPHONE ENCOUNTER
Weren't we sending her to Pulmonary med due to suspicious pulmonary nodule?    Vannessa Anderson C-FNP  [phone]

## 2023-06-16 NOTE — TELEPHONE ENCOUNTER
8:19 AM    Reason for Call: Phone Call    Description: Patient called in stating that someone was supposed to schedule here with a specialist and no one has called her. Please call patient back.    Was an appointment offered for this call? No  If yes : Appointment type              Date    Preferred method for responding to this message: Telephone Call  What is your phone number ? [phone]    If we cannot reach you directly, may we leave a detailed response at the number you provided? No, but she'll be there    Can this message wait until your PCP/provider returns, if available today? Not applicable    Anni Dunn

## 2023-06-19 ENCOUNTER — HOSPITAL ENCOUNTER (OUTPATIENT)
Dept: CT IMAGING | Facility: HOSPITAL | Age: 68
Discharge: HOME | End: 2023-06-19
Attending: INTERNAL MEDICINE | Admitting: INTERNAL MEDICINE
Payer: MEDICARE

## 2023-06-19 DIAGNOSIS — R91.1 LUNG NODULE: ICD-10-CM

## 2023-06-19 PROCEDURE — G1010 CDSM STANSON: HCPCS

## 2023-06-19 PROCEDURE — 250N000011 HC RX IP 250 OP 636: Performed by: RADIOLOGY

## 2023-06-19 RX ORDER — IOPAMIDOL 755 MG/ML
63 INJECTION, SOLUTION INTRAVASCULAR ONCE
Status: COMPLETED | OUTPATIENT
Start: 2023-06-19 | End: 2023-06-19

## 2023-06-19 RX ADMIN — IOPAMIDOL 63 ML: 755 INJECTION, SOLUTION INTRAVENOUS at 11:23

## 2023-06-20 DIAGNOSIS — R93.89 ABNORMAL CHEST CT: Primary | ICD-10-CM

## 2023-06-20 DIAGNOSIS — R91.8 OTHER NONSPECIFIC ABNORMAL FINDING OF LUNG FIELD: ICD-10-CM

## 2023-07-05 ENCOUNTER — HOSPITAL ENCOUNTER (OUTPATIENT)
Dept: PET IMAGING | Facility: HOSPITAL | Age: 68
Discharge: HOME | End: 2023-07-05
Attending: NURSE PRACTITIONER | Admitting: NURSE PRACTITIONER
Payer: MEDICARE

## 2023-07-05 ENCOUNTER — TELEPHONE (OUTPATIENT)
Dept: FAMILY MEDICINE | Facility: OTHER | Age: 68
End: 2023-07-05

## 2023-07-05 DIAGNOSIS — R91.8 PULMONARY NODULES: Primary | ICD-10-CM

## 2023-07-05 DIAGNOSIS — R93.89 ABNORMAL CHEST CT: ICD-10-CM

## 2023-07-05 DIAGNOSIS — R91.8 OTHER NONSPECIFIC ABNORMAL FINDING OF LUNG FIELD: ICD-10-CM

## 2023-07-05 DIAGNOSIS — R94.2 ABNORMAL PET SCAN OF LUNG: ICD-10-CM

## 2023-07-05 PROCEDURE — A9552 F18 FDG: HCPCS | Performed by: NURSE PRACTITIONER

## 2023-07-05 PROCEDURE — 343N000001 HC RX 343: Performed by: NURSE PRACTITIONER

## 2023-07-05 PROCEDURE — G1010 CDSM STANSON: HCPCS | Mod: PI

## 2023-07-05 RX ADMIN — FLUDEOXYGLUCOSE F-18 12.32 MILLICURIE: 500 INJECTION, SOLUTION INTRAVENOUS at 09:40

## 2023-07-05 NOTE — TELEPHONE ENCOUNTER
Spoke with Pushpa about PET scan. Pushpa would like to see pulmonology at [facility]. Referral placed.     Geni Lee, APRN, CNP

## 2023-07-11 ENCOUNTER — TELEPHONE (OUTPATIENT)
Dept: FAMILY MEDICINE | Facility: OTHER | Age: 68
End: 2023-07-11

## 2023-09-12 ENCOUNTER — TELEPHONE (OUTPATIENT)
Dept: FAMILY MEDICINE | Facility: OTHER | Age: 68
End: 2023-09-12

## 2023-09-12 DIAGNOSIS — F41.9 ANXIETY: Primary | ICD-10-CM

## 2023-09-12 RX ORDER — LORAZEPAM 0.5 MG/1
0.5 TABLET ORAL EVERY 6 HOURS PRN
Qty: 30 TABLET | Refills: 0 | Status: SHIPPED | OUTPATIENT
Start: 2023-09-12 | End: 2023-09-26

## 2023-09-12 NOTE — TELEPHONE ENCOUNTER
Received a PA request from [facility] for LORazepam (ATIVAN) 0.5 MG tablet. Submitted on CMM. Waiting for a response.

## 2023-09-12 NOTE — TELEPHONE ENCOUNTER
9:20 AM    Reason for Call: Phone Call    Description: Patient called in stating that she will be traveling next week and would like to be prescribed something to help with her fear, as was discussed at a previous appointment. Please call patient back.     Was an appointment offered for this call? No  If yes : Appointment type              Date    Preferred method for responding to this message: Telephone Call  What is your phone number ? [phone]     If we cannot reach you directly, may we leave a detailed response at the number you provided? No    Can this message wait until your PCP/provider returns, if available today? Not applicable, PROVIDER IN CLINIC    Anni Dunn

## 2023-09-13 NOTE — TELEPHONE ENCOUNTER
Received an APPROVAL from [employer] for LORazepam (ATIVAN) 0.5 MG tablet. Effective dates 6/15/23-9/13/24. Scanned in Epic.

## 2023-11-01 ENCOUNTER — OFFICE VISIT (OUTPATIENT)
Dept: FAMILY MEDICINE | Facility: OTHER | Age: 68
End: 2023-11-01
Attending: NURSE PRACTITIONER
Payer: MEDICARE

## 2023-11-01 VITALS
HEART RATE: 56 BPM | TEMPERATURE: 97.5 F | DIASTOLIC BLOOD PRESSURE: 62 MMHG | SYSTOLIC BLOOD PRESSURE: 124 MMHG | RESPIRATION RATE: 18 BRPM | OXYGEN SATURATION: 96 %

## 2023-11-01 DIAGNOSIS — I10 BENIGN ESSENTIAL HYPERTENSION: ICD-10-CM

## 2023-11-01 DIAGNOSIS — F17.200 NICOTINE DEPENDENCE, UNCOMPLICATED, UNSPECIFIED NICOTINE PRODUCT TYPE: ICD-10-CM

## 2023-11-01 DIAGNOSIS — G43.109 MIGRAINE WITH AURA AND WITHOUT STATUS MIGRAINOSUS, NOT INTRACTABLE: ICD-10-CM

## 2023-11-01 DIAGNOSIS — R91.8 PULMONARY NODULES: ICD-10-CM

## 2023-11-01 DIAGNOSIS — E78.5 HYPERLIPIDEMIA LDL GOAL <100: Primary | ICD-10-CM

## 2023-11-01 LAB
ALBUMIN SERPL BCG-MCNC: 4.5 G/DL (ref 3.5–5.2)
ALP SERPL-CCNC: 71 U/L (ref 35–104)
ALT SERPL W P-5'-P-CCNC: 13 U/L (ref 0–50)
ANION GAP SERPL CALCULATED.3IONS-SCNC: 13 MMOL/L (ref 7–15)
AST SERPL W P-5'-P-CCNC: 14 U/L (ref 0–45)
BILIRUB SERPL-MCNC: 0.4 MG/DL
BUN SERPL-MCNC: 13.2 MG/DL (ref 8–23)
CALCIUM SERPL-MCNC: 9.3 MG/DL (ref 8.8–10.2)
CHLORIDE SERPL-SCNC: 100 MMOL/L (ref 98–107)
CHOLEST SERPL-MCNC: 215 MG/DL
CREAT SERPL-MCNC: 0.59 MG/DL (ref 0.51–0.95)
DEPRECATED HCO3 PLAS-SCNC: 24 MMOL/L (ref 22–29)
EGFRCR SERPLBLD CKD-EPI 2021: >90 ML/MIN/1.73M2
GLUCOSE SERPL-MCNC: 97 MG/DL (ref 70–99)
HDLC SERPL-MCNC: 51 MG/DL
HOLD SPECIMEN: NORMAL
LDLC SERPL CALC-MCNC: 144 MG/DL
NONHDLC SERPL-MCNC: 164 MG/DL
POTASSIUM SERPL-SCNC: 3.9 MMOL/L (ref 3.4–5.3)
PROT SERPL-MCNC: 7 G/DL (ref 6.4–8.3)
SODIUM SERPL-SCNC: 137 MMOL/L (ref 135–145)
TRIGL SERPL-MCNC: 102 MG/DL
TSH SERPL DL<=0.005 MIU/L-ACNC: 1.19 UIU/ML (ref 0.3–4.2)

## 2023-11-01 PROCEDURE — G0008 ADMIN INFLUENZA VIRUS VAC: HCPCS

## 2023-11-01 PROCEDURE — G0463 HOSPITAL OUTPT CLINIC VISIT: HCPCS | Mod: 25

## 2023-11-01 PROCEDURE — 99214 OFFICE O/P EST MOD 30 MIN: CPT | Performed by: NURSE PRACTITIONER

## 2023-11-01 PROCEDURE — 80061 LIPID PANEL: CPT | Mod: ZL | Performed by: NURSE PRACTITIONER

## 2023-11-01 PROCEDURE — 84443 ASSAY THYROID STIM HORMONE: CPT | Mod: ZL | Performed by: NURSE PRACTITIONER

## 2023-11-01 PROCEDURE — 80053 COMPREHEN METABOLIC PANEL: CPT | Mod: ZL | Performed by: NURSE PRACTITIONER

## 2023-11-01 PROCEDURE — 36415 COLL VENOUS BLD VENIPUNCTURE: CPT | Mod: ZL | Performed by: NURSE PRACTITIONER

## 2023-11-01 RX ORDER — METOPROLOL SUCCINATE 25 MG/1
25 TABLET, EXTENDED RELEASE ORAL DAILY
Qty: 90 TABLET | Refills: 1 | Status: SHIPPED | OUTPATIENT
Start: 2023-11-01 | End: 2024-05-03

## 2023-11-01 RX ORDER — LOSARTAN POTASSIUM 50 MG/1
50 TABLET ORAL DAILY
Qty: 90 TABLET | Refills: 1 | Status: SHIPPED | OUTPATIENT
Start: 2023-11-01 | End: 2024-05-03

## 2023-11-01 RX ORDER — AMLODIPINE BESYLATE 10 MG/1
10 TABLET ORAL DAILY
Qty: 360 TABLET | Refills: 1 | Status: SHIPPED | OUTPATIENT
Start: 2023-11-01 | End: 2024-05-03

## 2023-11-01 ASSESSMENT — PAIN SCALES - GENERAL: PAINLEVEL: NO PAIN (0)

## 2023-11-01 NOTE — PATIENT INSTRUCTIONS
Assessment & Plan       Hyperlipidemia LDL goal <100  - Comprehensive metabolic panel; Future  - Lipid Profile (Chol, Trig, HDL, LDL calc); Future  - TSH with free T4 reflex; Future      Benign essential hypertension  - metoprolol succinate ER (TOPROL XL) 25 MG 24 hr tablet; Take 1 tablet (25 mg) by mouth daily  - amLODIPine (NORVASC) 10 MG tablet; Take 1 tablet (10 mg) by mouth daily  - losartan (COZAAR) 50 MG tablet; Take 1 tablet (50 mg) by mouth daily      Migraine with aura and without status migrainosus, not intractable  - Continue plan of care      Pulmonary nodules  - Continue Follow-up with pulmonary medicine       Nicotine dependence, uncomplicated, unspecified nicotine product type  - Quit options discussed           Nicotine/Tobacco Cessation:  She reports that she has been smoking cigarettes. She has a 30.00 pack-year smoking history. She has never used smokeless tobacco.  Nicotine/Tobacco Cessation Plan:   Information offered: Patient not interested at this time        Return in about 6 months (around 5/1/2024).        Vannessa Anderson, CNP  [facility]

## 2023-11-03 NOTE — RESULT ENCOUNTER NOTE
Lipids continue high - statin intolerance  Could try a non statin such as zetia  Low fat diet  Remaining labs are normal      The 10-year ASCVD risk score (Luisa CLEMENTE, et al., 2019) is: 19.4%    Values used to calculate the score:      Age: 68 years      Sex: Female      Is Non- : No      Diabetic: No      Tobacco smoker: Yes      Systolic Blood Pressure: 124 mmHg      Is BP treated: Yes      HDL Cholesterol: 51 mg/dL      Total Cholesterol: 215 mg/dL

## 2024-05-02 NOTE — PROGRESS NOTES
Assessment & Plan         Hyperlipidemia LDL goal <100  - Comprehensive metabolic panel  - Lipid Profile (Chol, Trig, HDL, LDL calc)  - TSH with free T4 reflex      Benign essential hypertension  - Comprehensive metabolic panel  - Lipid Profile (Chol, Trig, HDL, LDL calc)  - TSH with free T4 reflex      Vitamin D deficiency  - Vitamin D level  - D3 OTC      Leg and foot cramps  - Magnesium; Future  - Vitamin B12; Future  - Magnesium  mg OTC  - Quinine daily (tonic water or supplement)  - Tumeric daily OTC as discussed  - If symptoms continue I will refer you to Podiatry      Encounter for screening mammogram for malignant neoplasm of breast  - MA Screen Bilateral w/Mohamud; Future      Follow-up with pulmonary medicine regarding pulmonary nodules      BP medications have been refilled        Return in about 6 months (around 11/3/2024), or Medicare physical and CDM.      Please continue to take all medication as directed  Please notify your pharmacy or our refill line of future refills needed.  Please return sooner than your next scheduled appointment with any concerns.      When your lab results return, we will call you with abnormal findings  You can also view this information in your MyChart, if you have an account.  Please call or Paytrail message us with any questions you may have.        Vannessa Anderson [facility]  [phone]           Pushpa is a 68 year old, presenting for the following health issues:  Headache, Hypertension, and Lipids        Hyperlipidemia Follow-Up  Are you regularly taking any medication or supplement to lower your cholesterol?   No  Are you having muscle aches or other side effects that you think could be caused by your cholesterol lowering medication?  No        Hypertension Follow-up  Do you check your blood pressure regularly outside of the clinic? No   Are you following a low salt diet? No  Are your blood pressures ever more than 140 on the top number (systolic) OR more   than 90 on the  "bottom number (diastolic), for example 140/90? No Does not check outside of clinic        Migraine   Since your last clinic visit, how have your headaches changed?  No change  How often are you getting headaches or migraines? 1-2 a week   Are you able to do normal daily activities when you have a migraine? Yes  Are you taking rescue/relief medications? (Select all that apply) ibuprofen (Advil, Motrin)  How helpful is your rescue/relief medication?  I get some relief  Are you taking any medications to prevent migraines? (Select all that apply)  No  In the past 4 weeks, how often have you gone to urgent care or the emergency room because of your headaches?  0        Foot and leg cramps  Nightly, causes her toes to curl  Began 6 weeks ago        History of pulmonary nodules  Follows with pulmonary medicine in [place]  Visit UTD  Imaging due soon        Patient Active Problem List   Diagnosis    Dermatitis    Alopecia    Vitamin D deficiency    Benign essential hypertension    Hyperlipidemia LDL goal <100    Migraine with aura and without status migrainosus, not intractable    CNH (chondrodermatitis nodularis helicis)    Lichen sclerosus et atrophicus--VULVA 2012, Clobetasol, exam--7/21/2020    Other atopic dermatitis and related conditions    Microscopic hematuria--6/2020, 7/2020    Psoriasis    Pulmonary nodules    Osteopenia of multiple sites    Counseling regarding advanced directives     Past Surgical History:   Procedure Laterality Date    BIOPSY Right 06/15/2017    US guided right breast needle biopsy    BIOPSY BREAST Bilateral 7/17/2017    Procedure: BIOPSY BREAST;  EXCISIONAL BIOPSY RIGHT BREAST MASS, EXCISION LEFT BREAST SKIN TAG;  Surgeon: Tianna Hanley MD;  Location: [facility]    BLADDER SURGERY  1981    \"bladder tacked up\"    CHOLECYSTECTOMY  1975    Cholelithiasis    COLONOSCOPY N/A 1/12/2015    Procedure: COLONOSCOPY;  Surgeon: Tianna Hanley MD;  Location: [facility] OR    facial redisection gland removal      " "LEFT > infected neck gland    HYSTERECTOMY TOTAL ABDOMINAL N/A 01/01/1981    [facility] ORAL SURGERY PROCEDURE  08/23/2018    Had rods put in to prep for implants        Social History     Tobacco Use    Smoking status: Every Day     Current packs/day: 1.00     Average packs/day: 1 pack/day for 30.0 years (30.0 ttl pk-yrs)     Types: Cigarettes     Passive exposure: Current    Smokeless tobacco: Never    Tobacco comments:     Tried to Quit (YES); Longest Tobacco Free (\"Cutdown\")   Substance Use Topics    Alcohol use: Yes     Comment: OCCASIONALLY     Family History   Problem Relation Age of Onset    Cancer Brother         Liver    Colon Cancer Brother     Cancer Brother         Pancreatic    Cancer Other         Family Hx    Diabetes Father     Cancer Father         Stomach    Heart Disease Father         Heart Disease    Heart Disease Mother         Heart Disease    Cancer Mother 73        Liver    Cancer Sister 57        Pancreatic - Cause of Death             Current Outpatient Medications   Medication Sig Dispense Refill    amLODIPine (NORVASC) 10 MG tablet Take 1 tablet (10 mg) by mouth daily 360 tablet 1    Lactobacillus (PROBIOTIC ACIDOPHILUS PO)       losartan (COZAAR) 50 MG tablet Take 1 tablet (50 mg) by mouth daily 90 tablet 1    metoprolol succinate ER (TOPROL XL) 25 MG 24 hr tablet Take 1 tablet (25 mg) by mouth daily 90 tablet 1    triamcinolone (KENALOG) 0.5 % external ointment Apply to affected areas BID 60 g 1    vitamin D3 (CHOLECALCIFEROL) 50 mcg (2000 units) tablet Take 1 tablet (50 mcg) by mouth daily 90 tablet 3         Allergies   Allergen Reactions    Bacitracin     Codeine Nausea and Vomiting     cramping    Doxepin Unknown     face swelling    Lopid [Gemfibrozil] GI Disturbance    No Clinical Screening - See Comments Other (See Comments)     face swelling    Penicillins Hives     Provider wants to try Ancef(7/17/17).  Hives developed within 45 minutes.    Simvastatin Hives    Atorvastatin " Palpitations         Recent Labs   Lab Test 11/01/23  1359 04/24/23  1313 10/21/22  1427 10/01/21  1323 03/30/21  1415 11/02/20  1353 06/14/20  1846   * 128* 149*   < > 163*   < >  --    HDL 51 57 44*   < > 53   < >  --    TRIG 102 81 122   < > 171*   < >  --    ALT 13 14 15   < > 24  --  28   CR 0.59 0.53 0.52   < > 0.48*  --  0.54   GFRESTIMATED >90 >90 >90   < > >90  --  >90   GFRESTBLACK  --   --   --   --  >90  --  >90   POTASSIUM 3.9 4.1 4.3   < > 4.1  --  3.8   TSH 1.19 1.29 1.06   < > 1.96   < >  --     < > = values in this interval not displayed.          BP Readings from Last 3 Encounters:   05/03/24 130/70   11/01/23 124/62   04/24/23 136/74    Wt Readings from Last 3 Encounters:   05/03/24 56.7 kg (125 lb)   04/24/23 56.7 kg (125 lb)   01/30/23 56.7 kg (125 lb)                  Review of Systems  Constitutional, HEENT, cardiovascular, pulmonary, GI, , musculoskeletal, neuro, skin, endocrine and psych systems are negative, except as otherwise noted.          Objective    /70 (BP Location: Left arm, Patient Position: Sitting, Cuff Size: Adult Regular)   Pulse 69   Temp 97.6  F (36.4  C) (Tympanic)   Resp 18   Wt 56.7 kg (125 lb)   SpO2 97%   Breastfeeding No   BMI 23.62 kg/m    Body mass index is 23.62 kg/m .          Physical Exam   GENERAL: alert and no distress  EYES: Eyes grossly normal to inspection, PERRL and conjunctivae and sclerae normal  HENT: ear canals and TM's normal, nose and mouth without ulcers or lesions  NECK: no adenopathy, no asymmetry, masses, or scars  RESP: lungs clear to auscultation - no rales, rhonchi or wheezes  CV: regular rate and rhythm, normal S1 S2, no S3 or S4, no murmur, click or rub, no peripheral edema  MS: no gross musculoskeletal defects noted, no edema  SKIN: no suspicious lesions or rashes  PSYCH: mentation appears normal, affect normal/bright        The longitudinal plan of care for the diagnosis(es)/condition(s) as documented were addressed  during this visit. Due to the added complexity in care, I will continue to support Pushpa in the subsequent management and with ongoing continuity of care.           Signed Electronically by: Vannessa Anderson CNP

## 2024-05-03 ENCOUNTER — OFFICE VISIT (OUTPATIENT)
Dept: FAMILY MEDICINE | Facility: OTHER | Age: 69
End: 2024-05-03
Attending: NURSE PRACTITIONER
Payer: COMMERCIAL

## 2024-05-03 VITALS
BODY MASS INDEX: 23.62 KG/M2 | HEART RATE: 69 BPM | SYSTOLIC BLOOD PRESSURE: 130 MMHG | OXYGEN SATURATION: 97 % | RESPIRATION RATE: 18 BRPM | TEMPERATURE: 97.6 F | DIASTOLIC BLOOD PRESSURE: 70 MMHG | WEIGHT: 125 LBS

## 2024-05-03 DIAGNOSIS — E78.5 HYPERLIPIDEMIA LDL GOAL <100: Primary | ICD-10-CM

## 2024-05-03 DIAGNOSIS — R25.2 LEG CRAMPS: ICD-10-CM

## 2024-05-03 DIAGNOSIS — E55.9 VITAMIN D DEFICIENCY: ICD-10-CM

## 2024-05-03 DIAGNOSIS — I10 BENIGN ESSENTIAL HYPERTENSION: ICD-10-CM

## 2024-05-03 DIAGNOSIS — Z12.31 ENCOUNTER FOR SCREENING MAMMOGRAM FOR MALIGNANT NEOPLASM OF BREAST: ICD-10-CM

## 2024-05-03 LAB
ALBUMIN SERPL BCG-MCNC: 4.3 G/DL (ref 3.5–5.2)
ALP SERPL-CCNC: 65 U/L (ref 40–150)
ALT SERPL W P-5'-P-CCNC: 17 U/L (ref 0–50)
ANION GAP SERPL CALCULATED.3IONS-SCNC: 13 MMOL/L (ref 7–15)
AST SERPL W P-5'-P-CCNC: 14 U/L (ref 0–45)
BILIRUB SERPL-MCNC: 0.5 MG/DL
BUN SERPL-MCNC: 12.7 MG/DL (ref 8–23)
CALCIUM SERPL-MCNC: 9.2 MG/DL (ref 8.8–10.2)
CHLORIDE SERPL-SCNC: 103 MMOL/L (ref 98–107)
CHOLEST SERPL-MCNC: 234 MG/DL
CREAT SERPL-MCNC: 0.57 MG/DL (ref 0.51–0.95)
DEPRECATED HCO3 PLAS-SCNC: 23 MMOL/L (ref 22–29)
EGFRCR SERPLBLD CKD-EPI 2021: >90 ML/MIN/1.73M2
FASTING STATUS PATIENT QL REPORTED: YES
GLUCOSE SERPL-MCNC: 97 MG/DL (ref 70–99)
HDLC SERPL-MCNC: 65 MG/DL
HOLD SPECIMEN: NORMAL
LDLC SERPL CALC-MCNC: 151 MG/DL
MAGNESIUM SERPL-MCNC: 2.2 MG/DL (ref 1.7–2.3)
NONHDLC SERPL-MCNC: 169 MG/DL
POTASSIUM SERPL-SCNC: 3.8 MMOL/L (ref 3.4–5.3)
PROT SERPL-MCNC: 7.5 G/DL (ref 6.4–8.3)
SODIUM SERPL-SCNC: 139 MMOL/L (ref 135–145)
TRIGL SERPL-MCNC: 92 MG/DL
TSH SERPL DL<=0.005 MIU/L-ACNC: 1.28 UIU/ML (ref 0.3–4.2)

## 2024-05-03 PROCEDURE — G0463 HOSPITAL OUTPT CLINIC VISIT: HCPCS

## 2024-05-03 PROCEDURE — 83735 ASSAY OF MAGNESIUM: CPT | Mod: ZL | Performed by: NURSE PRACTITIONER

## 2024-05-03 PROCEDURE — 84443 ASSAY THYROID STIM HORMONE: CPT | Mod: ZL | Performed by: NURSE PRACTITIONER

## 2024-05-03 PROCEDURE — 80053 COMPREHEN METABOLIC PANEL: CPT | Mod: ZL | Performed by: NURSE PRACTITIONER

## 2024-05-03 PROCEDURE — 82306 VITAMIN D 25 HYDROXY: CPT | Mod: ZL | Performed by: NURSE PRACTITIONER

## 2024-05-03 PROCEDURE — 82607 VITAMIN B-12: CPT | Mod: ZL | Performed by: NURSE PRACTITIONER

## 2024-05-03 PROCEDURE — 80061 LIPID PANEL: CPT | Mod: ZL | Performed by: NURSE PRACTITIONER

## 2024-05-03 PROCEDURE — 36415 COLL VENOUS BLD VENIPUNCTURE: CPT | Mod: ZL | Performed by: NURSE PRACTITIONER

## 2024-05-03 PROCEDURE — G2211 COMPLEX E/M VISIT ADD ON: HCPCS | Performed by: NURSE PRACTITIONER

## 2024-05-03 PROCEDURE — 99214 OFFICE O/P EST MOD 30 MIN: CPT | Performed by: NURSE PRACTITIONER

## 2024-05-03 RX ORDER — AMLODIPINE BESYLATE 10 MG/1
10 TABLET ORAL DAILY
Qty: 360 TABLET | Refills: 1 | Status: SHIPPED | OUTPATIENT
Start: 2024-05-03

## 2024-05-03 RX ORDER — LOSARTAN POTASSIUM 50 MG/1
50 TABLET ORAL DAILY
Qty: 90 TABLET | Refills: 1 | Status: SHIPPED | OUTPATIENT
Start: 2024-05-03

## 2024-05-03 RX ORDER — METOPROLOL SUCCINATE 25 MG/1
25 TABLET, EXTENDED RELEASE ORAL DAILY
Qty: 90 TABLET | Refills: 1 | Status: SHIPPED | OUTPATIENT
Start: 2024-05-03

## 2024-05-03 ASSESSMENT — ANXIETY QUESTIONNAIRES
1. FEELING NERVOUS, ANXIOUS, OR ON EDGE: NOT AT ALL
2. NOT BEING ABLE TO STOP OR CONTROL WORRYING: NOT AT ALL
IF YOU CHECKED OFF ANY PROBLEMS ON THIS QUESTIONNAIRE, HOW DIFFICULT HAVE THESE PROBLEMS MADE IT FOR YOU TO DO YOUR WORK, TAKE CARE OF THINGS AT HOME, OR GET ALONG WITH OTHER PEOPLE: NOT DIFFICULT AT ALL
5. BEING SO RESTLESS THAT IT IS HARD TO SIT STILL: NOT AT ALL
3. WORRYING TOO MUCH ABOUT DIFFERENT THINGS: NOT AT ALL
GAD7 TOTAL SCORE: 0
7. FEELING AFRAID AS IF SOMETHING AWFUL MIGHT HAPPEN: NOT AT ALL
4. TROUBLE RELAXING: NOT AT ALL
GAD7 TOTAL SCORE: 0
6. BECOMING EASILY ANNOYED OR IRRITABLE: NOT AT ALL

## 2024-05-03 ASSESSMENT — PATIENT HEALTH QUESTIONNAIRE - PHQ9: SUM OF ALL RESPONSES TO PHQ QUESTIONS 1-9: 0

## 2024-05-03 ASSESSMENT — PAIN SCALES - GENERAL: PAINLEVEL: NO PAIN (0)

## 2024-05-03 NOTE — PATIENT INSTRUCTIONS
Assessment & Plan         Hyperlipidemia LDL goal <100  - Comprehensive metabolic panel  - Lipid Profile (Chol, Trig, HDL, LDL calc)  - TSH with free T4 reflex      Benign essential hypertension  - Comprehensive metabolic panel  - Lipid Profile (Chol, Trig, HDL, LDL calc)  - TSH with free T4 reflex      Vitamin D deficiency  - Vitamin D level  - D3 OTC      Leg and foot cramps  - Magnesium; Future  - Vitamin B12; Future  - Magnesium  mg OTC  - Quinine daily (tonic water or supplement)  - Tumeric daily OTC as discussed      Encounter for screening mammogram for malignant neoplasm of breast  - MA Screen Bilateral w/Mohamud; Future      Follow-up with pulmonary medicine regarding pulmonary nodules      BP medications have been refilled        Return in about 6 months (around 11/3/2024), or Medicare physical and CDM.      Please continue to take all medication as directed  Please notify your pharmacy or our refill line of future refills needed.  Please return sooner than your next scheduled appointment with any concerns.        Vannessa FRANCIS  [phone]

## 2024-05-04 LAB
VIT B12 SERPL-MCNC: 394 PG/ML (ref 232–1245)
VIT D+METAB SERPL-MCNC: 53 NG/ML (ref 20–50)

## 2024-05-06 NOTE — RESULT ENCOUNTER NOTE
Cardiac risk score is high - lipids high  Please see if she would consider a non-statin - Zetia 10mg daily  Low fat diet, avoid fried foods    D level high - hold D supplement for a couple months then resume 2000 U daily  Will recheck at next visit      The 10-year ASCVD risk score (Luisa CLEMENTE, et al., 2019) is: 20.8%    Values used to calculate the score:      Age: 68 years      Sex: Female      Is Non- : No      Diabetic: No      Tobacco smoker: Yes      Systolic Blood Pressure: 130 mmHg      Is BP treated: Yes      HDL Cholesterol: 65 mg/dL      Total Cholesterol: 234 mg/dL

## 2024-05-08 ENCOUNTER — TELEPHONE (OUTPATIENT)
Dept: FAMILY MEDICINE | Facility: OTHER | Age: 69
End: 2024-05-08

## 2024-05-08 NOTE — TELEPHONE ENCOUNTER
Results requested: test results      Best number to reach patient at: [phone]      Best time to call patient: anytime     Send to care team in basket.

## 2024-07-01 NOTE — PROGRESS NOTES
Assessment & Plan       Hyperlipidemia LDL goal <100  - Comprehensive metabolic panel; Future  - Lipid Profile (Chol, Trig, HDL, LDL calc); Future  - TSH with free T4 reflex; Future      Benign essential hypertension  - metoprolol succinate ER (TOPROL XL) 25 MG 24 hr tablet; Take 1 tablet (25 mg) by mouth daily  - amLODIPine (NORVASC) 10 MG tablet; Take 1 tablet (10 mg) by mouth daily  - losartan (COZAAR) 50 MG tablet; Take 1 tablet (50 mg) by mouth daily      Migraine with aura and without status migrainosus, not intractable  - Continue plan of care      Pulmonary nodules  - Continue Follow-up with pulmonary medicine       Nicotine dependence, uncomplicated, unspecified nicotine product type  - Quit options discussed           Nicotine/Tobacco Cessation:  She reports that she has been smoking cigarettes. She has a 30.00 pack-year smoking history. She has never used smokeless tobacco.  Nicotine/Tobacco Cessation Plan:   Information offered: Patient not interested at this time        Return in about 6 months (around 5/1/2024).        Vannessa Anderson, CNP  [facility] [place] B[place] is a 68 year old, presenting for the following health issues:  Lipids, Hypertension, and Headache        Hypertension Follow-up  Do you check your blood pressure regularly outside of the clinic? No   Are you following a low salt diet? Yes  Are your blood pressures ever more than 140 on the top number (systolic) OR more   than 90 on the bottom number (diastolic), for example 140/90? No      Migraine   Since your last clinic visit, how have your headaches changed?  No change  How often are you getting headaches or migraines? 2-3 x per month    Are you able to do normal daily activities when you have a migraine? Yes  Are you taking rescue/relief medications? (Select all that apply) ibuprofen (Advil, Motrin) and Tylenol  How helpful is your rescue/relief medication?  I get some relief  Are you taking any medications to  [facility]  MA Rooming Progress Note    Did the patient bring a friend or family member with them into the room? No  If so, did the patient give explicit permission for the practitioner to discuss their healthcare in front of that friend/family member? N/A    Patient would like communication via:  [employer]    Cell Phone:   Telephone Information:   Mobile [phone]     Okay to leave a message containing results? Yes  Patient states that it is okay to leave a detailed message.       "prevent migraines? (Select all that apply)  No  In the past 4 weeks, how often have you gone to urgent care or the emergency room because of your headaches?  0        Hyperlipidemia Follow-Up  Are you regularly taking any medication or supplement to lower your cholesterol?   No  Are you having muscle aches or other side effects that you think could be caused by your cholesterol lowering medication?  No      She has pulmonary nodules  Monitored by Pulmonary medicine in [place]  Upcoming PET scan scheduled      Patient Active Problem List   Diagnosis    Dermatitis    Alopecia    Vitamin D deficiency    Benign essential hypertension    Hyperlipidemia LDL goal <100    Migraine with aura and without status migrainosus, not intractable    CNH (chondrodermatitis nodularis helicis)    Lichen sclerosus et atrophicus--VULVA 2012, Clobetasol, exam--7/21/2020    Other atopic dermatitis and related conditions    Microscopic hematuria--6/2020, 7/2020    Psoriasis    Pulmonary nodules    Osteopenia of multiple sites    Counseling regarding advanced directives     Past Surgical History:   Procedure Laterality Date    BIOPSY Right 06/15/2017    US guided right breast needle biopsy    BIOPSY BREAST Bilateral 7/17/2017    Procedure: BIOPSY BREAST;  EXCISIONAL BIOPSY RIGHT BREAST MASS, EXCISION LEFT BREAST SKIN TAG;  Surgeon: Tianna Hanley MD;  Location: [facility]    BLADDER SURGERY  1981    \"bladder tacked up\"    CHOLECYSTECTOMY  1975    Cholelithiasis    COLONOSCOPY N/A 1/12/2015    Procedure: COLONOSCOPY;  Surgeon: Tianna Hanley MD;  Location: [facility] OR    facial redisection gland removal      LEFT > infected neck gland    HYSTERECTOMY TOTAL ABDOMINAL N/A 01/01/1981    [facility] ORAL SURGERY PROCEDURE  08/23/2018    Had rods put in to prep for implants        Social History     Tobacco Use    Smoking status: Every Day     Packs/day: 1.00     Years: 30.00     Additional pack years: 0.00     Total pack years: 30.00     Types: Cigarettes    " "Smokeless tobacco: Never    Tobacco comments:     Tried to Quit (YES); Longest Tobacco Free (\"Cutdown\")   Substance Use Topics    Alcohol use: Yes     Comment: OCCASIONALLY     Family History   Problem Relation Age of Onset    Cancer Brother         Liver    Colon Cancer Brother     Cancer Brother         Pancreatic    Cancer Other         Family Hx    Diabetes Father     Cancer Father         Stomach    Heart Disease Father         Heart Disease    Heart Disease Mother         Heart Disease    Cancer Mother 73        Liver    Cancer Sister 57        Pancreatic - Cause of Death             Current Outpatient Medications   Medication Sig Dispense Refill    amLODIPine (NORVASC) 10 MG tablet Take 1 tablet (10 mg) by mouth daily 360 tablet 1    Lactobacillus (PROBIOTIC ACIDOPHILUS PO)       losartan (COZAAR) 50 MG tablet Take 1 tablet (50 mg) by mouth daily 90 tablet 1    metoprolol succinate ER (TOPROL XL) 25 MG 24 hr tablet Take 1 tablet (25 mg) by mouth daily 90 tablet 1    triamcinolone (KENALOG) 0.5 % external ointment Apply to affected areas BID 60 g 1    vitamin D3 (CHOLECALCIFEROL) 50 mcg (2000 units) tablet Take 1 tablet (50 mcg) by mouth daily 90 tablet 3         Allergies   Allergen Reactions    Bacitracin     Codeine Nausea and Vomiting     cramping    Doxepin Unknown     face swelling    Lopid [Gemfibrozil] GI Disturbance    No Clinical Screening - See Comments Other (See Comments)     face swelling    Penicillins Hives     Provider wants to try Ancef(7/17/17).  Hives developed within 45 minutes.    Simvastatin Hives    Atorvastatin Palpitations         Recent Labs   Lab Test 04/24/23  1313 10/21/22  1427 04/11/22  1328 10/01/21  1323 03/30/21  1415 11/02/20  1353 06/14/20  1846   * 149* 144*   < > 163*   < >  --    HDL 57 44* 50   < > 53   < >  --    TRIG 81 122 122   < > 171*   < >  --    ALT 14 15 22   < > 24  --  28   CR 0.53 0.52 0.63   < > 0.48*  --  0.54   GFRESTIMATED >90 >90 >90   < > >90  " --  >90   GFRESTBLACK  --   --   --   --  >90  --  >90   POTASSIUM 4.1 4.3 3.9   < > 4.1  --  3.8   TSH 1.29 1.06 1.91  --  1.96   < >  --     < > = values in this interval not displayed.          BP Readings from Last 3 Encounters:   11/01/23 124/62   04/24/23 136/74   01/30/23 (!) 152/72    Wt Readings from Last 3 Encounters:   04/24/23 56.7 kg (125 lb)   01/30/23 56.7 kg (125 lb)   10/21/22 59 kg (130 lb)                 Review of Systems   Constitutional, HEENT, cardiovascular, pulmonary, GI, , musculoskeletal, neuro, skin, endocrine and psych systems are negative, except as otherwise noted.          Objective    /62 (BP Location: Left arm, Patient Position: Sitting, Cuff Size: Adult Regular)   Pulse 56   Temp 97.5  F (36.4  C) (Tympanic)   Resp 18   SpO2 96%   There is no height or weight on file to calculate BMI.        Physical Exam   GENERAL: healthy, alert and no distress  EYES: Eyes grossly normal to inspection, PERRL and conjunctivae and sclerae normal  HENT: ear canals and TM's normal, nose and mouth without ulcers or lesions  NECK: no adenopathy, no asymmetry, masses, or scars and thyroid normal to palpation  RESP: lungs clear to auscultation - no rales, rhonchi or wheezes  CV: regular rate and rhythm, normal S1 S2, no S3 or S4, no murmur, click or rub, no peripheral edema and peripheral pulses strong  MS: no gross musculoskeletal defects noted, no edema  SKIN: no suspicious lesions or rashes  PSYCH: mentation appears normal, affect normal/bright

## 2024-08-06 ENCOUNTER — ANCILLARY PROCEDURE (OUTPATIENT)
Dept: MAMMOGRAPHY | Facility: OTHER | Age: 69
End: 2024-08-06
Attending: NURSE PRACTITIONER
Payer: MEDICARE

## 2024-08-06 ENCOUNTER — TELEPHONE (OUTPATIENT)
Dept: MAMMOGRAPHY | Facility: HOSPITAL | Age: 69
End: 2024-08-06

## 2024-08-06 DIAGNOSIS — Z12.31 ENCOUNTER FOR SCREENING MAMMOGRAM FOR MALIGNANT NEOPLASM OF BREAST: ICD-10-CM

## 2024-08-06 PROCEDURE — 77063 BREAST TOMOSYNTHESIS BI: CPT | Mod: TC

## 2024-11-04 ENCOUNTER — TRANSFERRED RECORDS (OUTPATIENT)
Dept: OTHER | Facility: HOSPITAL | Age: 69
End: 2024-11-04

## 2024-11-04 ENCOUNTER — OFFICE VISIT (OUTPATIENT)
Dept: FAMILY MEDICINE | Facility: OTHER | Age: 69
End: 2024-11-04
Attending: NURSE PRACTITIONER
Payer: COMMERCIAL

## 2024-11-04 VITALS
TEMPERATURE: 96.8 F | RESPIRATION RATE: 16 BRPM | BODY MASS INDEX: 23.6 KG/M2 | WEIGHT: 125 LBS | HEART RATE: 61 BPM | DIASTOLIC BLOOD PRESSURE: 64 MMHG | OXYGEN SATURATION: 98 % | SYSTOLIC BLOOD PRESSURE: 116 MMHG | HEIGHT: 61 IN

## 2024-11-04 DIAGNOSIS — E78.5 HYPERLIPIDEMIA LDL GOAL <100: Primary | ICD-10-CM

## 2024-11-04 DIAGNOSIS — E55.9 VITAMIN D DEFICIENCY: ICD-10-CM

## 2024-11-04 DIAGNOSIS — F17.200 NICOTINE DEPENDENCE, UNCOMPLICATED, UNSPECIFIED NICOTINE PRODUCT TYPE: ICD-10-CM

## 2024-11-04 DIAGNOSIS — G43.109 MIGRAINE WITH AURA AND WITHOUT STATUS MIGRAINOSUS, NOT INTRACTABLE: ICD-10-CM

## 2024-11-04 DIAGNOSIS — I10 BENIGN ESSENTIAL HYPERTENSION: ICD-10-CM

## 2024-11-04 DIAGNOSIS — Z00.00 ENCOUNTER FOR MEDICARE ANNUAL WELLNESS EXAM: ICD-10-CM

## 2024-11-04 LAB
ALBUMIN SERPL BCG-MCNC: 4.3 G/DL (ref 3.5–5.2)
ALBUMIN UR-MCNC: NEGATIVE MG/DL
ALP SERPL-CCNC: 68 U/L (ref 40–150)
ALT SERPL W P-5'-P-CCNC: 14 U/L (ref 0–50)
ANION GAP SERPL CALCULATED.3IONS-SCNC: 12 MMOL/L (ref 7–15)
APPEARANCE UR: CLEAR
AST SERPL W P-5'-P-CCNC: 14 U/L (ref 0–45)
BASOPHILS # BLD AUTO: 0.1 10E3/UL (ref 0–0.2)
BASOPHILS NFR BLD AUTO: 1 %
BILIRUB SERPL-MCNC: 0.4 MG/DL
BILIRUB UR QL STRIP: NEGATIVE
BUN SERPL-MCNC: 11.2 MG/DL (ref 8–23)
CALCIUM SERPL-MCNC: 9.4 MG/DL (ref 8.8–10.4)
CHLORIDE SERPL-SCNC: 103 MMOL/L (ref 98–107)
CHOLEST SERPL-MCNC: 215 MG/DL
COLOR UR AUTO: YELLOW
CREAT SERPL-MCNC: 0.53 MG/DL (ref 0.51–0.95)
EGFRCR SERPLBLD CKD-EPI 2021: >90 ML/MIN/1.73M2
EOSINOPHIL # BLD AUTO: 0.2 10E3/UL (ref 0–0.7)
EOSINOPHIL NFR BLD AUTO: 3 %
ERYTHROCYTE [DISTWIDTH] IN BLOOD BY AUTOMATED COUNT: 12.9 % (ref 10–15)
FASTING STATUS PATIENT QL REPORTED: NO
FASTING STATUS PATIENT QL REPORTED: NO
GLUCOSE SERPL-MCNC: 94 MG/DL (ref 70–99)
GLUCOSE UR STRIP-MCNC: NEGATIVE MG/DL
HCO3 SERPL-SCNC: 22 MMOL/L (ref 22–29)
HCT VFR BLD AUTO: 41.3 % (ref 35–47)
HDLC SERPL-MCNC: 57 MG/DL
HGB BLD-MCNC: 13.9 G/DL (ref 11.7–15.7)
HGB UR QL STRIP: NEGATIVE
IMM GRANULOCYTES # BLD: 0 10E3/UL
IMM GRANULOCYTES NFR BLD: 0 %
KETONES UR STRIP-MCNC: NEGATIVE MG/DL
LDLC SERPL CALC-MCNC: 134 MG/DL
LEUKOCYTE ESTERASE UR QL STRIP: NEGATIVE
LYMPHOCYTES # BLD AUTO: 1.5 10E3/UL (ref 0.8–5.3)
LYMPHOCYTES NFR BLD AUTO: 25 %
MCH RBC QN AUTO: 34.2 PG (ref 26.5–33)
MCHC RBC AUTO-ENTMCNC: 33.7 G/DL (ref 31.5–36.5)
MCV RBC AUTO: 102 FL (ref 78–100)
MONOCYTES # BLD AUTO: 0.5 10E3/UL (ref 0–1.3)
MONOCYTES NFR BLD AUTO: 8 %
NEUTROPHILS # BLD AUTO: 3.8 10E3/UL (ref 1.6–8.3)
NEUTROPHILS NFR BLD AUTO: 63 %
NITRATE UR QL: NEGATIVE
NONHDLC SERPL-MCNC: 158 MG/DL
PH UR STRIP: 5.5 [PH] (ref 5–7)
PLATELET # BLD AUTO: 305 10E3/UL (ref 150–450)
POTASSIUM SERPL-SCNC: 4 MMOL/L (ref 3.4–5.3)
PROT SERPL-MCNC: 7.4 G/DL (ref 6.4–8.3)
RBC # BLD AUTO: 4.06 10E6/UL (ref 3.8–5.2)
SODIUM SERPL-SCNC: 137 MMOL/L (ref 135–145)
SP GR UR STRIP: <=1.005 (ref 1–1.03)
TRIGL SERPL-MCNC: 120 MG/DL
TSH SERPL DL<=0.005 MIU/L-ACNC: 1.25 UIU/ML (ref 0.3–4.2)
UROBILINOGEN UR STRIP-ACNC: 0.2 E.U./DL
WBC # BLD AUTO: 6 10E3/UL (ref 4–11)

## 2024-11-04 PROCEDURE — G0439 PPPS, SUBSEQ VISIT: HCPCS | Performed by: NURSE PRACTITIONER

## 2024-11-04 PROCEDURE — 80061 LIPID PANEL: CPT | Mod: ZL | Performed by: NURSE PRACTITIONER

## 2024-11-04 PROCEDURE — 36415 COLL VENOUS BLD VENIPUNCTURE: CPT | Mod: ZL | Performed by: NURSE PRACTITIONER

## 2024-11-04 PROCEDURE — 84443 ASSAY THYROID STIM HORMONE: CPT | Mod: ZL | Performed by: NURSE PRACTITIONER

## 2024-11-04 PROCEDURE — 82306 VITAMIN D 25 HYDROXY: CPT | Mod: ZL | Performed by: NURSE PRACTITIONER

## 2024-11-04 PROCEDURE — 85004 AUTOMATED DIFF WBC COUNT: CPT | Mod: ZL | Performed by: NURSE PRACTITIONER

## 2024-11-04 PROCEDURE — G0463 HOSPITAL OUTPT CLINIC VISIT: HCPCS | Mod: 25

## 2024-11-04 PROCEDURE — 82040 ASSAY OF SERUM ALBUMIN: CPT | Mod: ZL | Performed by: NURSE PRACTITIONER

## 2024-11-04 PROCEDURE — 99214 OFFICE O/P EST MOD 30 MIN: CPT | Mod: 25 | Performed by: NURSE PRACTITIONER

## 2024-11-04 PROCEDURE — G0008 ADMIN INFLUENZA VIRUS VAC: HCPCS

## 2024-11-04 PROCEDURE — 81003 URINALYSIS AUTO W/O SCOPE: CPT | Mod: ZL | Performed by: NURSE PRACTITIONER

## 2024-11-04 RX ORDER — METOPROLOL SUCCINATE 25 MG/1
25 TABLET, EXTENDED RELEASE ORAL DAILY
Qty: 90 TABLET | Refills: 1 | Status: SHIPPED | OUTPATIENT
Start: 2024-11-04

## 2024-11-04 RX ORDER — AMLODIPINE BESYLATE 10 MG/1
10 TABLET ORAL DAILY
Qty: 360 TABLET | Refills: 1 | Status: SHIPPED | OUTPATIENT
Start: 2024-11-04

## 2024-11-04 RX ORDER — LOSARTAN POTASSIUM 50 MG/1
50 TABLET ORAL DAILY
Qty: 90 TABLET | Refills: 1 | Status: SHIPPED | OUTPATIENT
Start: 2024-11-04

## 2024-11-04 SDOH — HEALTH STABILITY: PHYSICAL HEALTH: ON AVERAGE, HOW MANY DAYS PER WEEK DO YOU ENGAGE IN MODERATE TO STRENUOUS EXERCISE (LIKE A BRISK WALK)?: 5 DAYS

## 2024-11-04 ASSESSMENT — PAIN SCALES - GENERAL: PAINLEVEL_OUTOF10: NO PAIN (0)

## 2024-11-04 ASSESSMENT — SOCIAL DETERMINANTS OF HEALTH (SDOH): HOW OFTEN DO YOU GET TOGETHER WITH FRIENDS OR RELATIVES?: MORE THAN THREE TIMES A WEEK

## 2024-11-04 NOTE — PATIENT INSTRUCTIONS
Assessment & Plan         Encounter for Medicare annual wellness exam  - Annual physical 1 year      Hyperlipidemia LDL goal <100 - stable  - Comprehensive metabolic panel  - Lipid Profile (Chol, Trig, HDL, LDL calc)  - TSH with free T4 reflex  - CBC with platelets and differential      Benign essential hypertension - stable  - Comprehensive metabolic panel  - Lipid Profile (Chol, Trig, HDL, LDL calc)  - TSH with free T4 reflex  - CBC with platelets and differential  - metoprolol succinate ER (TOPROL XL) 25 MG 24 hr tablet; Take 1 tablet (25 mg) by mouth daily.  - losartan (COZAAR) 50 MG tablet; Take 1 tablet (50 mg) by mouth daily.  - amLODIPine (NORVASC) 10 MG tablet; Take 1 tablet (10 mg) by mouth daily.      Vitamin D deficiency  - Vitamin D level      Migraine with aura and without status migrainosus, not intractable  - Continue plan of care      Nicotine dependence, uncomplicated, unspecified nicotine product type  - Quit support printed        Patient has been advised of split billing requirements and indicates understanding: Yes        Nicotine/Tobacco Cessation  She reports that she has been smoking cigarettes. She has a 30 pack-year smoking history. She has been exposed to tobacco smoke. She has never used smokeless tobacco.  Nicotine/Tobacco Cessation Plan  Self help information given to patient      Counseling  Appropriate preventive services were addressed with this patient via screening, questionnaire, or discussion as appropriate for fall prevention, nutrition, physical activity, Tobacco-use cessation, social engagement, weight loss and cognition.  Checklist reviewing preventive services available has been given to the patient.  Reviewed patient's diet, addressing concerns and/or questions.         Return in about 6 months (around 5/4/2025).        All chronic conditions are stable at this time  Please continue to take all medication as directed  Please notify your pharmacy or our refill line of  future refills needed.  Please return sooner than your next scheduled appointment with any concerns.        When your lab results return, we will call you with abnormal findings  You can also view this information in your MyChart, if you have an account.  Please call or SafetySk[employer] message us with any questions you may have.          Vannessa Anderson [facility]  [phone]       Preventive Care Advice   This is general advice given by our system to help you stay healthy. However, your care team may have specific advice just for you. Please talk to your care team about your preventive care needs.  Nutrition  Eat 5 or more servings of fruits and vegetables each day.  Try wheat bread, brown rice and whole grain pasta (instead of white bread, rice, and pasta).  Get enough calcium and vitamin D. Check the label on foods and aim for 100% of the RDA (recommended daily allowance).  Lifestyle  Exercise at least 150 minutes each week  (30 minutes a day, 5 days a week).  Do muscle strengthening activities 2 days a week. These help control your weight and prevent disease.  No smoking.  Wear sunscreen to prevent skin cancer.  Have a dental exam and cleaning every 6 months.  Yearly exams  See your health care team every year to talk about:  Any changes in your health.  Any medicines your care team has prescribed.  Preventive care, family planning, and ways to prevent chronic diseases.  Shots (vaccines)   HPV shots (up to age 26), if you've never had them before.  Hepatitis B shots (up to age 59), if you've never had them before.  COVID-19 shot: Get this shot when it's due.  Flu shot: Get a flu shot every year.  Tetanus shot: Get a tetanus shot every 10 years.  Pneumococcal, hepatitis A, and RSV shots: Ask your care team if you need these based on your risk.  Shingles shot (for age 50 and up)  General health tests  Diabetes screening:  Starting at age 35, Get screened for diabetes at least every 3 years.  If you are younger than age 35, ask  your care team if you should be screened for diabetes.  Cholesterol test: At age 39, start having a cholesterol test every 5 years, or more often if advised.  Bone density scan (DEXA): At age 50, ask your care team if you should have this scan for osteoporosis (brittle bones).  Hepatitis C: Get tested at least once in your life.  STIs (sexually transmitted infections)  Before age 24: Ask your care team if you should be screened for STIs.  After age 24: Get screened for STIs if you're at risk. You are at risk for STIs (including HIV) if:  You are sexually active with more than one person.  You don't use condoms every time.  You or a partner was diagnosed with a sexually transmitted infection.  If you are at risk for HIV, ask about PrEP medicine to prevent HIV.  Get tested for HIV at least once in your life, whether you are at risk for HIV or not.  Cancer screening tests  Cervical cancer screening: If you have a cervix, begin getting regular cervical cancer screening tests starting at age 21.  Breast cancer scan (mammogram): If you've ever had breasts, begin having regular mammograms starting at age 40. This is a scan to check for breast cancer.  Colon cancer screening: It is important to start screening for colon cancer at age 45.  Have a colonoscopy test every 10 years (or more often if you're at risk) Or, ask your provider about stool tests like a FIT test every year or Cologuard test every 3 years.  To learn more about your testing options, visit:   .  For help making a decision, visit:   https://bit.ly/si92683.  Prostate cancer screening test: If you have a prostate, ask your care team if a prostate cancer screening test (PSA) at age 55 is right for you.  Lung cancer screening: If you are a current or former smoker ages 50 to 80, ask your care team if ongoing lung cancer screenings are right for you.  For informational purposes only. Not to replace the advice of your health care provider. Copyright   2023  [facility]. All rights reserved. Clinically reviewed by the [facility] Transitions Program. [employer] 869951 - REV 01/24.     Quitting Tobacco: Care Instructions  Quitting tobacco is much harder than simply changing a habit. Nicotine cravings make it hard to quit, but you can do it. Your doctor will help you set up the plan that best meets your needs.    You will miss the nicotine at first. You may feel short-tempered and grumpy. You may have trouble sleeping or thinking clearly. The urge to use tobacco may continue for a time.   Combining tools can raise your chances of success. You can use medicine along with counseling. And you can join a quit-tobacco program, such as the American Lung Association's Freedom from Smoking program.     Get support.  Reach out to family and friends, and find a counselor to help you quit. Join a support group, such as Nicotine Anonymous. Go to www.smokefree.gov to sign up for text messaging support.     Talk to your doctor or pharmacist about medicines that can help you quit.  Medicines can help with cravings and withdrawal symptoms. There are several over-the-counter choices, such as nicotine patches, gum, and lozenges.     After you quit, do not use tobacco again, not even once.  Get rid of all tobacco products and anything that reminds you of tobacco, such as ashtrays.     Avoid things that make you reach for tobacco.  Change your daily routine. Take a different route to work, or eat a meal in a different place.     Try to cut down on stress.  Find ways to calm yourself, such as taking a hot bath or doing deep breathing exercises.     Eat a healthy diet, and get regular exercise.  Having healthy habits may help you quit using tobacco.     Don't give up on quitting if you use tobacco again.  Most people quit and restart a few times before they quit for good.   Follow-up care is a key part of your treatment and safety. Be sure to make and go to all appointments,  "and call your doctor if you are having problems. It's also a good idea to know your test results and keep a list of the medicines you take.  Where can you learn more?  Go to https://www.CDNlion.net/patiented  Enter Y522 in the search box to learn more about \"Quitting Tobacco: Care Instructions.\"  Current as of: November 15, 2023  Content Version: 14.2 2024 [facility] [employer] [facility].   Care instructions adapted under license by your healthcare professional. If you have questions about a medical condition or this instruction, always ask your healthcare professional. [employer] disclaims any warranty or liability for your use of this information.    "

## 2024-11-04 NOTE — PROGRESS NOTES
Preventive Care Visit  [facility] CLINT[facility] CirilogriseldaLIZAB[facility], Family Medicine        Nov 4, 2024          Assessment & Plan         Encounter for Medicare annual wellness exam  - Annual physical 1 year      Hyperlipidemia LDL goal <100 - stable  - Comprehensive metabolic panel  - Lipid Profile (Chol, Trig, HDL, LDL calc)  - TSH with free T4 reflex  - CBC with platelets and differential      Benign essential hypertension - stable  - Comprehensive metabolic panel  - Lipid Profile (Chol, Trig, HDL, LDL calc)  - TSH with free T4 reflex  - CBC with platelets and differential  - metoprolol succinate ER (TOPROL XL) 25 MG 24 hr tablet; Take 1 tablet (25 mg) by mouth daily.  - losartan (COZAAR) 50 MG tablet; Take 1 tablet (50 mg) by mouth daily.  - amLODIPine (NORVASC) 10 MG tablet; Take 1 tablet (10 mg) by mouth daily.      Vitamin D deficiency  - Vitamin D level      Migraine with aura and without status migrainosus, not intractable  - Continue plan of care      Nicotine dependence, uncomplicated, unspecified nicotine product type  - Quit support printed        Patient has been advised of split billing requirements and indicates understanding: Yes        Nicotine/Tobacco Cessation  She reports that she has been smoking cigarettes. She has a 30 pack-year smoking history. She has been exposed to tobacco smoke. She has never used smokeless tobacco.  Nicotine/Tobacco Cessation Plan  Self help information given to patient      Counseling  Appropriate preventive services were addressed with this patient via screening, questionnaire, or discussion as appropriate for fall prevention, nutrition, physical activity, Tobacco-use cessation, social engagement, weight loss and cognition.  Checklist reviewing preventive services available has been given to the patient.  Reviewed patient's diet, addressing concerns and/or questions.         Return in about 6 months (around 5/4/2025).        All chronic conditions are stable at this time  Please  continue to take all medication as directed  Please notify your pharmacy or our refill line of future refills needed.  Please return sooner than your next scheduled appointment with any concerns.        When your lab results return, we will call you with abnormal findings  You can also view this information in your MyChart, if you have an account.  Please call or [employer] message us with any questions you may have.          Vannessa Anderson [facility]  [phone]           Pushpa is a 69 year old, presenting for the following:  Physical          Hyperlipidemia Follow-Up  Are you regularly taking any medication or supplement to lower your cholesterol?   No  Are you having muscle aches or other side effects that you think could be caused by your cholesterol lowering medication?  No        Hypertension Follow-up  Do you check your blood pressure regularly outside of the clinic? No   Are you following a low salt diet? Yes  Are your blood pressures ever more than 140 on the top number (systolic) OR more   than 90 on the bottom number (diastolic), for example 140/90? No        Migraine   Since your last clinic visit, how have your headaches changed?  No change  How often are you getting headaches or migraines? Off an don   Are you able to do normal daily activities when you have a migraine? Yes  Are you taking rescue/relief medications? (Select all that apply) Tylenol  How helpful is your rescue/relief medication?  I get total relief  Are you taking any medications to prevent migraines? (Select all that apply)  No  In the past 4 weeks, how often have you gone to urgent care or the emergency room because of your headaches?  0          Vitamin D deficiency - lab due, ordered        Health Care Directive  Patient does not have a Health Care Directive: Discussed advance care planning with patient; however, patient declined at this time.            11/4/2024   General Health   How would you rate your overall physical health? Good             11/4/2024   Nutrition   Diet: Regular (no restrictions)            11/4/2024   Exercise   Days per week of moderate/strenous exercise 5 days              11/4/2024   Social Factors   Frequency of gathering with friends or relatives More than three times a week   Worry food won't last until get money to buy more No   Food not last or not have enough money for food? No   Do you have housing? (Housing is defined as stable permanent housing and does not include staying ouside in a car, in a tent, in an abandoned building, in an overnight shelter, or couch-surfing.) No   Are you worried about losing your housing? No   Lack of transportation? No   Unable to get utilities (heat,electricity)? No   Want help with housing or utility concern? No          (!) HOUSING CONCERN PRESENT      11/4/2024   Fall Risk   Fallen 2 or more times in the past year? No    Trouble with walking or balance? No        Patient-reported          11/4/2024   Activities of Daily Living- Home Safety   Needs help with the following daily activites None of the above   Safety concerns in the home None of the above            11/4/2024   Dental   Dentist two times every year? Yes            11/4/2024   Hearing Screening   Hearing concerns? None of the above            11/4/2024   Driving Risk Screening   Patient/family members have concerns about driving No            11/4/2024   General Alertness/Fatigue Screening   Have you been more tired than usual lately? No            11/4/2024   Urinary Incontinence Screening   Bothered by leaking urine in past 6 months No            11/4/2024   TB Screening   Were you born outside of the [place]? No            Today's PHQ-2 Score:       11/4/2024     2:13 PM   PHQ-2 ( 1999 [employer])   Q1: Little interest or pleasure in doing things 0    Q2: Feeling down, depressed or hopeless 0    PHQ-2 Score 0    Q1: Little interest or pleasure in doing things Not at all   Q2: Feeling down, depressed or hopeless Not at all  "  PHQ-2 Score 0       Patient-reported             11/4/2024   Substance Use   If I could quit smoking, I would Somewhat agree   I want to quit somking, worry about health affects Somewhat agree   Willing to make a plan to quit smoking Completely disagree   Willing to cut down before quitting Somewhat agree   Alcohol more than 3/day or more than 7/wk No   Do you have a current opioid prescription? No   How severe/bad is pain from 1 to 10? 0/10 (No Pain)   Do you use any other substances recreationally? No          Social History     Tobacco Use    Smoking status: Every Day     Current packs/day: 1.00     Average packs/day: 1 pack/day for 30.0 years (30.0 ttl pk-yrs)     Types: Cigarettes     Passive exposure: Current    Smokeless tobacco: Never    Tobacco comments:     Tried to Quit (YES); Longest Tobacco Free (\"Cutdown\")   Vaping Use    Vaping status: Never Used   Substance Use Topics    Alcohol use: Yes     Comment: OCCASIONALLY    Drug use: No               6/12/2023   LAST FHS-7 RESULTS   1st degree relative breast or ovarian cancer Yes   Any relative bilateral breast cancer No   Any male have breast cancer No   Any ONE woman have BOTH breast AND ovarian cancer No   Any woman with breast cancer before 50yrs No   2 or more relatives with breast AND/OR ovarian cancer No   2 or more relatives with breast AND/OR bowel cancer No            Mammogram is UTD 8/6/24 - normal          ASCVD Risk   The 10-year ASCVD risk score (Luisa CLEMENTE, et al., 2019) is: 18.3%    Values used to calculate the score:      Age: 69 years      Sex: Female      Is Non- : No      Diabetic: No      Tobacco smoker: Yes      Systolic Blood Pressure: 116 mmHg      Is BP treated: Yes      HDL Cholesterol: 65 mg/dL      Total Cholesterol: 234 mg/dL           Meds                  Past Medical History:   Diagnosis Date    Alopecia     Benign essential hypertension 4/18/2018    Dermatitis     Gastroesophageal reflux " "disease without esophagitis 2018    Hyperlipidemia LDL goal < 100     Hyperlipidemia LDL goal <100 2018    Interstitial emphysema and related condition of  3/30/2021    Lump of breast, right 2017    Migraine with aura and without status migrainosus, not intractable 2018    Nipple discharge in female 2017    PONV (postoperative nausea and vomiting)     Psoriasis 10/1/2021    Pulmonary nodules 2022    Taking a statin medication 2018    Tobacco abuse 2011    Vitamin D deficiency 2013         Past Surgical History:   Procedure Laterality Date    BIOPSY Right 06/15/2017    US guided right breast needle biopsy    BIOPSY BREAST Bilateral 2017    Procedure: BIOPSY BREAST;  EXCISIONAL BIOPSY RIGHT BREAST MASS, EXCISION LEFT BREAST SKIN TAG;  Surgeon: Tianna Hanley MD;  Location: [facility] OR    BLADDER SURGERY      \"bladder tacked up\"    CHOLECYSTECTOMY      Cholelithiasis    COLONOSCOPY N/A 2015    Procedure: COLONOSCOPY;  Surgeon: Tianna Hanley MD;  Location: [facility] OR    facial redisection gland removal      LEFT > infected neck gland    HYSTERECTOMY TOTAL ABDOMINAL N/A 1981    [facility] ORAL SURGERY PROCEDURE  2018    Had rods put in to prep for implants            OB History    Para Term  AB Living   6 6 6 0 0 0   SAB IAB Ectopic Multiple Live Births   0 0 0 0 0      # Outcome Date GA Lbr Reagan/2nd Weight Sex Type Anes PTL Lv   6 Term            5 Term            4 Term            3 Term            2 Term            1 Term                  Lab work is in process  Labs reviewed in EPIC  BP Readings from Last 3 Encounters:   24 116/64   24 130/70   23 124/62    Wt Readings from Last 3 Encounters:   24 56.7 kg (125 lb)   24 56.7 kg (125 lb)   23 56.7 kg (125 lb)                  Patient Active Problem List   Diagnosis    Dermatitis    Alopecia    Vitamin D deficiency    Benign essential hypertension    " "Hyperlipidemia LDL goal <100    Migraine with aura and without status migrainosus, not intractable    CNH (chondrodermatitis nodularis helicis)    Lichen sclerosus et atrophicus--VULVA 2012, Clobetasol, exam--7/21/2020    Other atopic dermatitis and related conditions    Microscopic hematuria--6/2020, 7/2020    Psoriasis    Pulmonary nodules    Osteopenia of multiple sites    Counseling regarding advanced directives     Past Surgical History:   Procedure Laterality Date    BIOPSY Right 06/15/2017    US guided right breast needle biopsy    BIOPSY BREAST Bilateral 7/17/2017    Procedure: BIOPSY BREAST;  EXCISIONAL BIOPSY RIGHT BREAST MASS, EXCISION LEFT BREAST SKIN TAG;  Surgeon: Tianna Hanley MD;  Location: [facility]    BLADDER SURGERY  1981    \"bladder tacked up\"    CHOLECYSTECTOMY  1975    Cholelithiasis    COLONOSCOPY N/A 1/12/2015    Procedure: COLONOSCOPY;  Surgeon: Tianna Hanley MD;  Location: [facility]    facial redisection gland removal      LEFT > infected neck gland    HYSTERECTOMY TOTAL ABDOMINAL N/A 01/01/1981    [facility] ORAL SURGERY PROCEDURE  08/23/2018    Had rods put in to prep for implants        Social History     Tobacco Use    Smoking status: Every Day     Current packs/day: 1.00     Average packs/day: 1 pack/day for 30.0 years (30.0 ttl pk-yrs)     Types: Cigarettes     Passive exposure: Current    Smokeless tobacco: Never    Tobacco comments:     Tried to Quit (YES); Longest Tobacco Free (\"Cutdown\")   Substance Use Topics    Alcohol use: Yes     Comment: OCCASIONALLY     Family History   Problem Relation Age of Onset    Cancer Brother         Liver    Colon Cancer Brother     Cancer Brother         Pancreatic    Cancer Other         Family Hx    Diabetes Father     Cancer Father         Stomach    Heart Disease Father         Heart Disease    Heart Disease Mother         Heart Disease    Cancer Mother 73        Liver    Cancer Sister 57        Pancreatic - Cause of Death             Current " Outpatient Medications   Medication Sig Dispense Refill    amLODIPine (NORVASC) 10 MG tablet Take 1 tablet (10 mg) by mouth daily. 360 tablet 1    Lactobacillus (PROBIOTIC ACIDOPHILUS PO)       losartan (COZAAR) 50 MG tablet Take 1 tablet (50 mg) by mouth daily. 90 tablet 1    metoprolol succinate ER (TOPROL XL) 25 MG 24 hr tablet Take 1 tablet (25 mg) by mouth daily. 90 tablet 1    triamcinolone (KENALOG) 0.5 % external ointment Apply to affected areas BID 60 g 1    vitamin D3 (CHOLECALCIFEROL) 50 mcg (2000 units) tablet Take 1 tablet (50 mcg) by mouth daily 90 tablet 3         Allergies   Allergen Reactions    Bacitracin     Codeine Nausea and Vomiting     cramping    Doxepin Unknown     face swelling    Lopid [Gemfibrozil] GI Disturbance    No Clinical Screening - See Comments Other (See Comments)     face swelling    Penicillins Hives     Provider wants to try Ancef(7/17/17).  Hives developed within 45 minutes.    Simvastatin Hives    Atorvastatin Palpitations         Recent Labs   Lab Test 05/03/24  1445 11/01/23  1359 04/24/23  1313 10/01/21  1323 03/30/21  1415 11/02/20  1353 06/14/20  1846   * 144* 128*   < > 163*   < >  --    HDL 65 51 57   < > 53   < >  --    TRIG 92 102 81   < > 171*   < >  --    ALT 17 13 14   < > 24  --  28   CR 0.57 0.59 0.53   < > 0.48*  --  0.54   GFRESTIMATED >90 >90 >90   < > >90  --  >90   GFRESTBLACK  --   --   --   --  >90  --  >90   POTASSIUM 3.8 3.9 4.1   < > 4.1  --  3.8   TSH 1.28 1.19 1.29   < > 1.96   < >  --     < > = values in this interval not displayed.            Current providers sharing in care for this patient include:  Patient Care Team:  Vannessa Anderson CNP as PCP - General (Family Practice)  Vannessa Anderson CNP as Assigned PCP  Jr Hart RPH as Pharmacist (Pharmacist)          The following health maintenance items are reviewed in Epic and correct as of today:  Health Maintenance   Topic Date Due    ZOSTER IMMUNIZATION (1 of 2) Never done    DTAP/TDAP/TD  "IMMUNIZATION (1 - Tdap) 11/03/2020    Pneumococcal Vaccine: 65+ Years (2 of 2 - PCV) 11/02/2021    LUNG CANCER SCREENING  07/05/2024    INFLUENZA VACCINE (1) 09/01/2024    COVID-19 Vaccine (4 - 2024-25 season) 09/01/2024    LIPID  11/03/2024    COLORECTAL CANCER SCREENING  01/12/2025    BMP  05/03/2025    ELIZABETH ASSESSMENT  05/03/2025    PHQ-9  05/03/2025    MAMMO SCREENING  08/06/2025    NICOTINE/TOBACCO CESSATION COUNSELING Q 1 YR  11/04/2025    MEDICARE ANNUAL WELLNESS VISIT  11/04/2025    FALL RISK ASSESSMENT  11/04/2025    GLUCOSE  05/03/2027    ADVANCE CARE PLANNING  10/21/2027    DEXA  06/12/2028    RSV VACCINE (1 - 1-dose 75+ series) 09/29/2028    PHQ-2 (once per calendar year)  Completed    HEPATITIS C SCREENING  Addressed    HPV IMMUNIZATION  Aged Out    MENINGITIS IMMUNIZATION  Aged Out    RSV MONOCLONAL ANTIBODY  Aged Out           Review of Systems  Constitutional, HEENT, cardiovascular, pulmonary, GI, , musculoskeletal, neuro, skin, endocrine and psych systems are negative, except as otherwise noted.           Objective    Exam  /64 (BP Location: Left arm, Patient Position: Sitting, Cuff Size: Adult Regular)   Pulse 61   Temp 96.8  F (36  C) (Tympanic)   Resp 16   Ht 1.543 m (5' 0.75\")   Wt 56.7 kg (125 lb)   SpO2 98%   BMI 23.81 kg/m     Estimated body mass index is 23.81 kg/m  as calculated from the following:    Height as of this encounter: 1.543 m (5' 0.75\").    Weight as of this encounter: 56.7 kg (125 lb).          Physical Exam  GENERAL: alert and no distress  EYES: Eyes grossly normal to inspection, PERRL and conjunctivae and sclerae normal  HENT: ear canals and TM's normal, nose and mouth without ulcers or lesions  NECK: no adenopathy, no asymmetry, masses, or scars  RESP: lungs clear to auscultation - no rales, rhonchi or wheezes  CV: regular rate and rhythm, normal S1 S2, no S3 or S4, no murmur, click or rub, no peripheral edema  MS: no gross musculoskeletal defects noted, no " edema  SKIN: no suspicious lesions or rashes  PSYCH: mentation appears normal, affect normal/bright            11/4/2024   Mini Cog   Mini-Cog Not Completed (choose reason) Patient declines               Signed Electronically by: Vannessa Anderson CNP

## 2024-11-05 LAB — VIT D+METAB SERPL-MCNC: 39 NG/ML (ref 20–50)

## 2025-05-02 NOTE — PROGRESS NOTES
Assessment & Plan         Hyperlipidemia LDL goal <100  - Lipid Profile (Chol, Trig, HDL, LDL calc); Future  - Comprehensive metabolic panel; Future      Benign essential hypertension  - Lipid Profile (Chol, Trig, HDL, LDL calc); Future  - Comprehensive metabolic panel; Future  - TSH with free T4 reflex; Future  - amLODIPine (NORVASC) 10 MG tablet; Take 1 tablet (10 mg) by mouth daily.  - losartan (COZAAR) 50 MG tablet; Take 1 tablet (50 mg) by mouth daily.  - metoprolol succinate ER (TOPROL XL) 25 MG 24 hr tablet; Take 1 tablet (25 mg) by mouth daily.      Vitamin D deficiency  - Vitamin D level      Pulmonary nodules  - Follow-up with Pulmonary medicine as scheduled  - CBC with platelets and differential; Future      Migraine with aura and without status migrainosus, not intractable  - Follow-up as needed      Psoriasis  - triamcinolone (KENALOG) 0.5 % external ointment; Apply to affected areas BID  - mupirocin (BACTROBAN) 2 % external ointment; Apply topically 3 times daily.      Encounter for screening mammogram for malignant neoplasm of breast  - MA Screen Bilateral w/Mohamud; Future          Follow-up 6 months        Please continue to take all medication as directed  Please notify your pharmacy or our refill line of future refills needed.  Please return sooner than your next scheduled appointment with any concerns.        When your lab results return, we will call you with abnormal findings  You can also view this information in your MyChart, if you have an account.  Please call or [facility] message us with any questions you may have.          Vannessa Monica [facility]  [phone]           Pushpa is a 69 year old, presenting for the following health issues:  Lipids, Hypertension, and Headache          Hyperlipidemia Follow-Up  Are you regularly taking any medication or supplement to lower your cholesterol?   No  Are you having muscle aches or other side effects that you think could be caused by your cholesterol  lowering medication?  No          Hypertension Follow-up  Do you check your blood pressure regularly outside of the clinic? No   Are you following a low salt diet? No  Are your blood pressures ever more than 140 on the top number (systolic) OR more   than 90 on the bottom number (diastolic), for example 140/90? No does not check outside of clinic          BP Readings from Last 2 Encounters:   05/05/25 138/75   11/04/24 116/64           Migraine   Since your last clinic visit, how have your headaches changed?  No change  How often are you getting headaches or migraines? Once a week   Are you able to do normal daily activities when you have a migraine? No  Are you taking rescue/relief medications? (Select all that apply) ibuprofen (Advil, Motrin)  How helpful is your rescue/relief medication?  I get some relief  Are you taking any medications to prevent migraines? (Select all that apply)  No  In the past 4 weeks, how often have you gone to urgent care or the emergency room because of your headaches?  0        Vitamin D deficiency - D level due - takes OTC D3          History of pulmonary nodules - Follows up with Pulmonary Medicine - visit upcoming in July with CT at [facility]          Patient Active Problem List   Diagnosis    Dermatitis    Alopecia    Vitamin D deficiency    Benign essential hypertension    Hyperlipidemia LDL goal <100    Migraine with aura and without status migrainosus, not intractable    CNH (chondrodermatitis nodularis helicis)    Lichen sclerosus et atrophicus--VULVA 2012, Clobetasol, exam--7/21/2020    Other atopic dermatitis and related conditions    Microscopic hematuria--6/2020, 7/2020    Psoriasis    Pulmonary nodules    Osteopenia of multiple sites    Counseling regarding advanced directives     Past Surgical History:   Procedure Laterality Date    BIOPSY Right 06/15/2017    US guided right breast needle biopsy    BIOPSY BREAST Bilateral 7/17/2017    Procedure: BIOPSY BREAST;  EXCISIONAL  "BIOPSY RIGHT BREAST MASS, EXCISION LEFT BREAST SKIN TAG;  Surgeon: Tianna Hanley MD;  Location: [facility] OR    BLADDER SURGERY  1981    \"bladder tacked up\"    CHOLECYSTECTOMY  1975    Cholelithiasis    COLONOSCOPY N/A 1/12/2015    Procedure: COLONOSCOPY;  Surgeon: Tianna Hanley MD;  Location: [facility] OR    facial redisection gland removal      LEFT > infected neck gland    HYSTERECTOMY TOTAL ABDOMINAL N/A 01/01/1981    [facility] ORAL SURGERY PROCEDURE  08/23/2018    Had rods put in to prep for implants        Social History     Tobacco Use    Smoking status: Every Day     Current packs/day: 1.00     Average packs/day: 1 pack/day for 30.0 years (30.0 ttl pk-yrs)     Types: Cigarettes     Passive exposure: Current    Smokeless tobacco: Never    Tobacco comments:     Tried to Quit (YES); Longest Tobacco Free (\"Cutdown\")   Substance Use Topics    Alcohol use: Yes     Comment: OCCASIONALLY     Family History   Problem Relation Age of Onset    Cancer Brother         Liver    Colon Cancer Brother     Cancer Brother         Pancreatic    Cancer Other         Family Hx    Diabetes Father     Cancer Father         Stomach    Heart Disease Father         Heart Disease    Heart Disease Mother         Heart Disease    Cancer Mother 73        Liver    Cancer Sister 57        Pancreatic - Cause of Death             Current Outpatient Medications   Medication Sig Dispense Refill    amLODIPine (NORVASC) 10 MG tablet Take 1 tablet (10 mg) by mouth daily. 360 tablet 1    Lactobacillus (PROBIOTIC ACIDOPHILUS PO)       losartan (COZAAR) 50 MG tablet Take 1 tablet (50 mg) by mouth daily. 90 tablet 1    metoprolol succinate ER (TOPROL XL) 25 MG 24 hr tablet Take 1 tablet (25 mg) by mouth daily. 90 tablet 1    triamcinolone (KENALOG) 0.5 % external ointment Apply to affected areas BID 60 g 1    vitamin D3 (CHOLECALCIFEROL) 50 mcg (2000 units) tablet Take 1 tablet (50 mcg) by mouth daily 90 tablet 3         Allergies   Allergen Reactions    " "Bacitracin     Codeine Nausea and Vomiting     cramping    Doxepin Unknown     face swelling    Lopid [Gemfibrozil] GI Disturbance    No Clinical Screening - See Comments Other (See Comments)     face swelling    Penicillins Hives     Provider wants to try Ancef(7/17/17).  Hives developed within 45 minutes.    Simvastatin Hives    Atorvastatin Palpitations         Recent Labs   Lab Test 11/04/24  1419 05/03/24  1445 11/01/23  1359 10/01/21  1323 03/30/21  1415 11/02/20  1353 06/14/20  1846   * 151* 144*   < > 163*   < >  --    HDL 57 65 51   < > 53   < >  --    TRIG 120 92 102   < > 171*   < >  --    ALT 14 17 13   < > 24  --  28   CR 0.53 0.57 0.59   < > 0.48*  --  0.54   GFRESTIMATED >90 >90 >90   < > >90  --  >90   GFRESTBLACK  --   --   --   --  >90  --  >90   POTASSIUM 4.0 3.8 3.9   < > 4.1  --  3.8   TSH 1.25 1.28 1.19   < > 1.96   < >  --     < > = values in this interval not displayed.          BP Readings from Last 3 Encounters:   05/05/25 138/75   11/04/24 116/64   05/03/24 130/70    Wt Readings from Last 3 Encounters:   05/05/25 57.6 kg (127 lb)   11/04/24 56.7 kg (125 lb)   05/03/24 56.7 kg (125 lb)                           Objective    /75 (BP Location: Left arm, Patient Position: Sitting, Cuff Size: Adult Regular)   Pulse 63   Temp 98.1  F (36.7  C) (Tympanic)   Resp 16   Ht 1.554 m (5' 1.2\")   Wt 57.6 kg (127 lb)   SpO2 97%   Breastfeeding No   BMI 23.84 kg/m    Body mass index is 23.84 kg/m .          Physical Exam   GENERAL: alert and no distress  EYES: Eyes grossly normal to inspection, PERRL and conjunctivae and sclerae normal  HENT: ear canals and TM's normal, nose and mouth without ulcers or lesions  NECK: no adenopathy, no asymmetry, masses, or scars  RESP: lungs clear to auscultation - no rales, rhonchi or wheezes  CV: regular rate and rhythm, normal S1 S2, no S3 or S4, no murmur, click or rub, no peripheral edema  MS: no gross musculoskeletal defects noted, no " edema  SKIN: no suspicious lesions or rashes  PSYCH: mentation appears normal, affect normal/bright          Declines colon cancer screening        The longitudinal plan of care for the diagnosis(es)/condition(s) as documented were addressed during this visit. Due to the added complexity in care, I will continue to support Pushpa in the subsequent management and with ongoing continuity of care.         Signed Electronically by: Vannessa Anderson CNP

## 2025-05-02 NOTE — PATIENT INSTRUCTIONS
Assessment & Plan         Hyperlipidemia LDL goal <100  - Lipid Profile (Chol, Trig, HDL, LDL calc); Future  - Comprehensive metabolic panel; Future      Benign essential hypertension  - Lipid Profile (Chol, Trig, HDL, LDL calc); Future  - Comprehensive metabolic panel; Future  - TSH with free T4 reflex; Future  - amLODIPine (NORVASC) 10 MG tablet; Take 1 tablet (10 mg) by mouth daily.  - losartan (COZAAR) 50 MG tablet; Take 1 tablet (50 mg) by mouth daily.  - metoprolol succinate ER (TOPROL XL) 25 MG 24 hr tablet; Take 1 tablet (25 mg) by mouth daily.      Vitamin D deficiency  - Vitamin D level      Pulmonary nodules  - Follow-up with Pulmonary medicine as scheduled  - CBC with platelets and differential; Future      Migraine with aura and without status migrainosus, not intractable  - Follow-up as needed      Psoriasis  - triamcinolone (KENALOG) 0.5 % external ointment; Apply to affected areas BID  - mupirocin (BACTROBAN) 2 % external ointment; Apply topically 3 times daily.      Encounter for screening mammogram for malignant neoplasm of breast  - MA Screen Bilateral w/Mohamud; Future          Follow-up 6 months        Please continue to take all medication as directed  Please notify your pharmacy or our refill line of future refills needed.  Please return sooner than your next scheduled appointment with any concerns.        When your lab results return, we will call you with abnormal findings  You can also view this information in your MyChart, if you have an account.  Please call or "[employer]" message us with any questions you may have.          Vannessa FRANCIS  [phone]

## 2025-05-05 ENCOUNTER — OFFICE VISIT (OUTPATIENT)
Dept: FAMILY MEDICINE | Facility: OTHER | Age: 70
End: 2025-05-05
Attending: NURSE PRACTITIONER
Payer: MEDICARE

## 2025-05-05 VITALS
BODY MASS INDEX: 23.98 KG/M2 | OXYGEN SATURATION: 97 % | HEIGHT: 61 IN | TEMPERATURE: 98.1 F | SYSTOLIC BLOOD PRESSURE: 138 MMHG | RESPIRATION RATE: 16 BRPM | HEART RATE: 63 BPM | DIASTOLIC BLOOD PRESSURE: 75 MMHG | WEIGHT: 127 LBS

## 2025-05-05 DIAGNOSIS — Z12.31 ENCOUNTER FOR SCREENING MAMMOGRAM FOR MALIGNANT NEOPLASM OF BREAST: ICD-10-CM

## 2025-05-05 DIAGNOSIS — E78.5 HYPERLIPIDEMIA LDL GOAL <100: Primary | ICD-10-CM

## 2025-05-05 DIAGNOSIS — E55.9 VITAMIN D DEFICIENCY: ICD-10-CM

## 2025-05-05 DIAGNOSIS — I10 BENIGN ESSENTIAL HYPERTENSION: ICD-10-CM

## 2025-05-05 DIAGNOSIS — R91.8 PULMONARY NODULES: ICD-10-CM

## 2025-05-05 DIAGNOSIS — G43.109 MIGRAINE WITH AURA AND WITHOUT STATUS MIGRAINOSUS, NOT INTRACTABLE: ICD-10-CM

## 2025-05-05 DIAGNOSIS — L40.9 PSORIASIS: ICD-10-CM

## 2025-05-05 LAB
ALBUMIN SERPL BCG-MCNC: 4.4 G/DL (ref 3.5–5.2)
ALP SERPL-CCNC: 65 U/L (ref 40–150)
ALT SERPL W P-5'-P-CCNC: 18 U/L (ref 0–50)
ANION GAP SERPL CALCULATED.3IONS-SCNC: 14 MMOL/L (ref 7–15)
AST SERPL W P-5'-P-CCNC: 17 U/L (ref 0–45)
BASOPHILS # BLD AUTO: 0.1 10E3/UL (ref 0–0.2)
BASOPHILS NFR BLD AUTO: 1 %
BILIRUB SERPL-MCNC: 0.4 MG/DL
BUN SERPL-MCNC: 16.4 MG/DL (ref 8–23)
CALCIUM SERPL-MCNC: 9.7 MG/DL (ref 8.8–10.4)
CHLORIDE SERPL-SCNC: 102 MMOL/L (ref 98–107)
CHOLEST SERPL-MCNC: 218 MG/DL
CREAT SERPL-MCNC: 0.55 MG/DL (ref 0.51–0.95)
EGFRCR SERPLBLD CKD-EPI 2021: >90 ML/MIN/1.73M2
EOSINOPHIL # BLD AUTO: 0.2 10E3/UL (ref 0–0.7)
EOSINOPHIL NFR BLD AUTO: 3 %
ERYTHROCYTE [DISTWIDTH] IN BLOOD BY AUTOMATED COUNT: 12.6 % (ref 10–15)
FASTING STATUS PATIENT QL REPORTED: YES
FASTING STATUS PATIENT QL REPORTED: YES
GLUCOSE SERPL-MCNC: 96 MG/DL (ref 70–99)
HCO3 SERPL-SCNC: 23 MMOL/L (ref 22–29)
HCT VFR BLD AUTO: 39.2 % (ref 35–47)
HDLC SERPL-MCNC: 66 MG/DL
HGB BLD-MCNC: 13.1 G/DL (ref 11.7–15.7)
IMM GRANULOCYTES # BLD: 0 10E3/UL
IMM GRANULOCYTES NFR BLD: 0 %
LDLC SERPL CALC-MCNC: 131 MG/DL
LYMPHOCYTES # BLD AUTO: 1.5 10E3/UL (ref 0.8–5.3)
LYMPHOCYTES NFR BLD AUTO: 20 %
MCH RBC QN AUTO: 34.4 PG (ref 26.5–33)
MCHC RBC AUTO-ENTMCNC: 33.4 G/DL (ref 31.5–36.5)
MCV RBC AUTO: 103 FL (ref 78–100)
MONOCYTES # BLD AUTO: 0.6 10E3/UL (ref 0–1.3)
MONOCYTES NFR BLD AUTO: 8 %
NEUTROPHILS # BLD AUTO: 5.3 10E3/UL (ref 1.6–8.3)
NEUTROPHILS NFR BLD AUTO: 68 %
NONHDLC SERPL-MCNC: 152 MG/DL
PLATELET # BLD AUTO: 255 10E3/UL (ref 150–450)
POTASSIUM SERPL-SCNC: 4.1 MMOL/L (ref 3.4–5.3)
PROT SERPL-MCNC: 8 G/DL (ref 6.4–8.3)
RBC # BLD AUTO: 3.81 10E6/UL (ref 3.8–5.2)
SODIUM SERPL-SCNC: 139 MMOL/L (ref 135–145)
TRIGL SERPL-MCNC: 103 MG/DL
TSH SERPL DL<=0.005 MIU/L-ACNC: 1.68 UIU/ML (ref 0.3–4.2)
WBC # BLD AUTO: 7.8 10E3/UL (ref 4–11)

## 2025-05-05 PROCEDURE — 85004 AUTOMATED DIFF WBC COUNT: CPT | Mod: ZL | Performed by: NURSE PRACTITIONER

## 2025-05-05 PROCEDURE — 82306 VITAMIN D 25 HYDROXY: CPT | Mod: ZL | Performed by: NURSE PRACTITIONER

## 2025-05-05 PROCEDURE — 82465 ASSAY BLD/SERUM CHOLESTEROL: CPT | Mod: ZL | Performed by: NURSE PRACTITIONER

## 2025-05-05 PROCEDURE — G0463 HOSPITAL OUTPT CLINIC VISIT: HCPCS

## 2025-05-05 PROCEDURE — 36415 COLL VENOUS BLD VENIPUNCTURE: CPT | Mod: ZL | Performed by: NURSE PRACTITIONER

## 2025-05-05 PROCEDURE — 82310 ASSAY OF CALCIUM: CPT | Mod: ZL | Performed by: NURSE PRACTITIONER

## 2025-05-05 PROCEDURE — 84443 ASSAY THYROID STIM HORMONE: CPT | Mod: ZL | Performed by: NURSE PRACTITIONER

## 2025-05-05 RX ORDER — LOSARTAN POTASSIUM 50 MG/1
50 TABLET ORAL DAILY
Qty: 90 TABLET | Refills: 1 | Status: SHIPPED | OUTPATIENT
Start: 2025-05-05

## 2025-05-05 RX ORDER — MUPIROCIN 20 MG/G
OINTMENT TOPICAL 3 TIMES DAILY
Qty: 30 G | Refills: 3 | Status: SHIPPED | OUTPATIENT
Start: 2025-05-05

## 2025-05-05 RX ORDER — METOPROLOL SUCCINATE 25 MG/1
25 TABLET, EXTENDED RELEASE ORAL DAILY
Qty: 90 TABLET | Refills: 1 | Status: SHIPPED | OUTPATIENT
Start: 2025-05-05

## 2025-05-05 RX ORDER — AMLODIPINE BESYLATE 10 MG/1
10 TABLET ORAL DAILY
Qty: 360 TABLET | Refills: 1 | Status: SHIPPED | OUTPATIENT
Start: 2025-05-05

## 2025-05-05 RX ORDER — TRIAMCINOLONE ACETONIDE 5 MG/G
OINTMENT TOPICAL
Qty: 60 G | Refills: 1 | Status: SHIPPED | OUTPATIENT
Start: 2025-05-05

## 2025-05-05 ASSESSMENT — ANXIETY QUESTIONNAIRES
GAD7 TOTAL SCORE: 0
3. WORRYING TOO MUCH ABOUT DIFFERENT THINGS: NOT AT ALL
5. BEING SO RESTLESS THAT IT IS HARD TO SIT STILL: NOT AT ALL
IF YOU CHECKED OFF ANY PROBLEMS ON THIS QUESTIONNAIRE, HOW DIFFICULT HAVE THESE PROBLEMS MADE IT FOR YOU TO DO YOUR WORK, TAKE CARE OF THINGS AT HOME, OR GET ALONG WITH OTHER PEOPLE: NOT DIFFICULT AT ALL
8. IF YOU CHECKED OFF ANY PROBLEMS, HOW DIFFICULT HAVE THESE MADE IT FOR YOU TO DO YOUR WORK, TAKE CARE OF THINGS AT HOME, OR GET ALONG WITH OTHER PEOPLE?: NOT DIFFICULT AT ALL
7. FEELING AFRAID AS IF SOMETHING AWFUL MIGHT HAPPEN: NOT AT ALL
7. FEELING AFRAID AS IF SOMETHING AWFUL MIGHT HAPPEN: NOT AT ALL
1. FEELING NERVOUS, ANXIOUS, OR ON EDGE: NOT AT ALL
6. BECOMING EASILY ANNOYED OR IRRITABLE: NOT AT ALL
2. NOT BEING ABLE TO STOP OR CONTROL WORRYING: NOT AT ALL
GAD7 TOTAL SCORE: 0
4. TROUBLE RELAXING: NOT AT ALL
GAD7 TOTAL SCORE: 0

## 2025-05-05 ASSESSMENT — PATIENT HEALTH QUESTIONNAIRE - PHQ9
10. IF YOU CHECKED OFF ANY PROBLEMS, HOW DIFFICULT HAVE THESE PROBLEMS MADE IT FOR YOU TO DO YOUR WORK, TAKE CARE OF THINGS AT HOME, OR GET ALONG WITH OTHER PEOPLE: NOT DIFFICULT AT ALL
SUM OF ALL RESPONSES TO PHQ QUESTIONS 1-9: 0
SUM OF ALL RESPONSES TO PHQ QUESTIONS 1-9: 0

## 2025-05-05 ASSESSMENT — PAIN SCALES - GENERAL: PAINLEVEL_OUTOF10: NO PAIN (0)

## 2025-05-06 LAB — VIT D+METAB SERPL-MCNC: 40 NG/ML (ref 20–50)

## 2025-05-07 ENCOUNTER — RESULTS FOLLOW-UP (OUTPATIENT)
Dept: FAMILY MEDICINE | Facility: OTHER | Age: 70
End: 2025-05-07

## 2025-08-08 ENCOUNTER — ANCILLARY PROCEDURE (OUTPATIENT)
Dept: MAMMOGRAPHY | Facility: OTHER | Age: 70
End: 2025-08-08
Attending: NURSE PRACTITIONER
Payer: MEDICARE

## 2025-08-08 DIAGNOSIS — Z12.31 ENCOUNTER FOR SCREENING MAMMOGRAM FOR MALIGNANT NEOPLASM OF BREAST: ICD-10-CM

## 2025-08-08 PROCEDURE — 77063 BREAST TOMOSYNTHESIS BI: CPT | Mod: 26 | Performed by: RADIOLOGY

## 2025-08-08 PROCEDURE — 77063 BREAST TOMOSYNTHESIS BI: CPT | Mod: TC

## 2025-08-08 PROCEDURE — 77067 SCR MAMMO BI INCL CAD: CPT | Mod: 26 | Performed by: RADIOLOGY

## 2025-08-11 ENCOUNTER — TELEPHONE (OUTPATIENT)
Dept: MAMMOGRAPHY | Facility: HOSPITAL | Age: 70
End: 2025-08-11

## 2025-08-13 ENCOUNTER — TELEPHONE (OUTPATIENT)
Dept: FAMILY MEDICINE | Facility: OTHER | Age: 70
End: 2025-08-13

## (undated) DEVICE — DRAPE-CHEST/BREAST

## (undated) DEVICE — LIGACLIP-MEDIUM 29.2CM CLIP APPLIER

## (undated) DEVICE — TOPICAL SKIN ADHESIVE EXOFIN

## (undated) DEVICE — SUTURE-VICRYL 3-0 PS-1 J936H

## (undated) DEVICE — GLV-7.0 PROTEXIS PI CLASSIC LF/PF

## (undated) DEVICE — LABEL-STERILE PREPRINTED FOR OR

## (undated) DEVICE — TUBING-SUCTION 20FT

## (undated) DEVICE — SYRINGE-20CC LUER LOCK

## (undated) DEVICE — NDL-18G 1 1/2" NON-SAFETY

## (undated) DEVICE — SWABSTICK-BENZOIN

## (undated) DEVICE — STERI-STRIP-1" X 5" IODOFORM

## (undated) DEVICE — CAUTERY PAD-POLYHESIVE II ADULT

## (undated) DEVICE — DRSG-SPONGE X-RAY 4 X 4

## (undated) DEVICE — NDL-21G 1-1/2" NON-SAFETY

## (undated) DEVICE — CANISTER-SUCTION 2000CC

## (undated) DEVICE — APPLICATOR-CHLORAPREP 26ML TINTED CHG 2%+ 70% IPA-SURGICAL

## (undated) DEVICE — PACK-LAPAROTOMY-CUSTOM

## (undated) DEVICE — GOWN-SURG XL LVL 3 REINFORCED

## (undated) DEVICE — BLADE-SCALPEL #15

## (undated) DEVICE — CAUTERY PENCIL-SMOKE EVACUATION

## (undated) DEVICE — IRRIGATION-H2O 1000ML

## (undated) DEVICE — DRSG-KERLIX 6 X 6 3/4 FLUFF

## (undated) DEVICE — SUTURE-ETHILON 4-0 PS-2 1667G

## (undated) DEVICE — TOWEL-OR DISP 4PKS

## (undated) DEVICE — DRSG-ISLAND 4IN X 5IN

## (undated) DEVICE — LIGHT HANDLE COVER

## (undated) DEVICE — SCD SLEEVE-KNEE REG.

## (undated) DEVICE — SYRINGE-ASEPTO IRRIGATION

## (undated) DEVICE — SUTURE-VICRYL 3-0 SH J416H

## (undated) RX ORDER — DEXAMETHASONE SODIUM PHOSPHATE 10 MG/ML
INJECTION, SOLUTION INTRAMUSCULAR; INTRAVENOUS
Status: DISPENSED
Start: 2017-07-17

## (undated) RX ORDER — ONDANSETRON 2 MG/ML
INJECTION INTRAMUSCULAR; INTRAVENOUS
Status: DISPENSED
Start: 2017-07-17

## (undated) RX ORDER — FENTANYL CITRATE 50 UG/ML
INJECTION, SOLUTION INTRAMUSCULAR; INTRAVENOUS
Status: DISPENSED
Start: 2017-07-17

## (undated) RX ORDER — LIDOCAINE HYDROCHLORIDE 20 MG/ML
INJECTION, SOLUTION EPIDURAL; INFILTRATION; INTRACAUDAL; PERINEURAL
Status: DISPENSED
Start: 2017-07-17

## (undated) RX ORDER — PROPOFOL 10 MG/ML
INJECTION, EMULSION INTRAVENOUS
Status: DISPENSED
Start: 2017-07-17